# Patient Record
Sex: MALE | Race: WHITE | NOT HISPANIC OR LATINO | Employment: FULL TIME | URBAN - NONMETROPOLITAN AREA
[De-identification: names, ages, dates, MRNs, and addresses within clinical notes are randomized per-mention and may not be internally consistent; named-entity substitution may affect disease eponyms.]

---

## 2020-07-03 ENCOUNTER — APPOINTMENT (OUTPATIENT)
Dept: GENERAL RADIOLOGY | Facility: HOSPITAL | Age: 25
End: 2020-07-03

## 2020-07-03 ENCOUNTER — HOSPITAL ENCOUNTER (EMERGENCY)
Facility: HOSPITAL | Age: 25
Discharge: HOME | End: 2020-07-03
Attending: EMERGENCY MEDICINE | Admitting: EMERGENCY MEDICINE

## 2020-07-03 VITALS
HEIGHT: 72 IN | OXYGEN SATURATION: 99 % | SYSTOLIC BLOOD PRESSURE: 119 MMHG | DIASTOLIC BLOOD PRESSURE: 89 MMHG | RESPIRATION RATE: 18 BRPM | HEART RATE: 78 BPM | TEMPERATURE: 98.2 F | BODY MASS INDEX: 23.27 KG/M2 | WEIGHT: 171.8 LBS

## 2020-07-03 DIAGNOSIS — J40 BRONCHITIS: Primary | ICD-10-CM

## 2020-07-03 PROCEDURE — 71046 X-RAY EXAM CHEST 2 VIEWS: CPT

## 2020-07-03 PROCEDURE — 99282 EMERGENCY DEPT VISIT SF MDM: CPT

## 2020-07-03 NOTE — ED PROVIDER NOTES
Subjective   25-year-old male presents with brown and bloody sputum when he coughs, intermittently for 2 years.  He states that he has had a brown mucousy substance when he coughs intermittently, he has been to the emergency department in the past, but he admits that he is never followed up with her primary care physician as recommended when he goes to emergency departments or urgent treatment centers.  He reports that he is a smoker.  He states that at times he will cough and he will see a brown to mucous substance when he coughs.  He has been on antibiotics in the past, but this does not cleared up.  No fever no chills no shortness of breath.      History provided by:  Patient   used: No        Review of Systems   Respiratory: Positive for cough.    All other systems reviewed and are negative.      History reviewed. No pertinent past medical history.    No Known Allergies    Past Surgical History:   Procedure Laterality Date   • FRACTURE SURGERY         History reviewed. No pertinent family history.    Social History     Socioeconomic History   • Marital status: Single     Spouse name: Not on file   • Number of children: Not on file   • Years of education: Not on file   • Highest education level: Not on file   Tobacco Use   • Smoking status: Current Every Day Smoker     Packs/day: 0.50     Types: Cigarettes   Substance and Sexual Activity   • Alcohol use: Yes     Comment: social   • Drug use: Not Currently           Objective   Physical Exam   Constitutional: He is oriented to person, place, and time. He appears well-developed and well-nourished.   HENT:   Head: Normocephalic and atraumatic.   Eyes: EOM are normal.   Neck: Normal range of motion.   Cardiovascular: Normal rate and regular rhythm.   Pulmonary/Chest: Effort normal and breath sounds normal. No stridor. No respiratory distress.   Musculoskeletal: Normal range of motion.   Neurological: He is alert and oriented to person, place, and  time.   Skin: Skin is warm and dry.   Psychiatric: He has a normal mood and affect.   Nursing note and vitals reviewed.      Procedures           ED Course                                           MDM  Number of Diagnoses or Management Options  Bronchitis: new and requires workup  Risk of Complications, Morbidity, and/or Mortality  Presenting problems: minimal  Diagnostic procedures: minimal  Management options: minimal    Patient Progress  Patient progress: stable      Final diagnoses:   Bronchitis            Arthur Starks Jr., PA-C  07/03/20 3347

## 2021-07-09 ENCOUNTER — TRANSCRIBE ORDERS (OUTPATIENT)
Dept: ADMINISTRATIVE | Facility: HOSPITAL | Age: 26
End: 2021-07-09

## 2021-07-09 ENCOUNTER — HOSPITAL ENCOUNTER (OUTPATIENT)
Dept: ULTRASOUND IMAGING | Facility: HOSPITAL | Age: 26
Discharge: HOME | End: 2021-07-09
Admitting: NURSE PRACTITIONER

## 2021-07-09 ENCOUNTER — HOSPITAL ENCOUNTER (EMERGENCY)
Facility: HOSPITAL | Age: 26
Discharge: HOME | End: 2021-07-09
Attending: EMERGENCY MEDICINE | Admitting: EMERGENCY MEDICINE

## 2021-07-09 VITALS
WEIGHT: 191 LBS | TEMPERATURE: 98.5 F | HEART RATE: 84 BPM | RESPIRATION RATE: 16 BRPM | HEIGHT: 72 IN | OXYGEN SATURATION: 99 % | BODY MASS INDEX: 25.87 KG/M2 | DIASTOLIC BLOOD PRESSURE: 80 MMHG | SYSTOLIC BLOOD PRESSURE: 124 MMHG

## 2021-07-09 DIAGNOSIS — R10.2 ADNEXAL TENDERNESS, RIGHT: ICD-10-CM

## 2021-07-09 DIAGNOSIS — R10.2 ADNEXAL TENDERNESS, RIGHT: Primary | ICD-10-CM

## 2021-07-09 DIAGNOSIS — I82.4Y1 ACUTE DEEP VEIN THROMBOSIS (DVT) OF PROXIMAL VEIN OF RIGHT LOWER EXTREMITY (HCC): Primary | ICD-10-CM

## 2021-07-09 PROCEDURE — 99282 EMERGENCY DEPT VISIT SF MDM: CPT

## 2021-07-09 PROCEDURE — 76857 US EXAM PELVIC LIMITED: CPT

## 2021-07-09 NOTE — ED PROVIDER NOTES
Subjective   Chief Complaint: Right groin pain  History of Present Illness: 26-year-old male comes in for evaluation above complaints.  He states for the past 3 days.  No chest pain or shortness of breath.  No history of recent surgery immobilization or travel.  He had an outpatient ultrasound which showed a right common and superficial femoral DVT.  He has no pain outside of the groin.  No mid or distal thigh or popliteal or calf or ankle or foot pain.  2+ DP pulse on the right.  No history of DVT.  No family history.  No sniffing medical problems.  Onset:3 Days ago  Timing: Onset ongoing  Exacerbating / Alleviating factors: Worse with palpation of the right groin and medial proximal thigh  Associated symptoms: None specifically no chest pain or shortness of breath      Nurses Notes reviewed and agree, including vitals, allergies, social history and prior medical history.          Review of Systems   Constitutional: Negative.    HENT: Negative.    Eyes: Negative.    Respiratory: Negative.    Cardiovascular: Negative.    Gastrointestinal: Negative.    Genitourinary: Negative.    Musculoskeletal:        Right groin and proximal thigh pain   Skin: Negative.    Allergic/Immunologic: Negative.    Neurological: Negative.    Psychiatric/Behavioral: Negative.    All other systems reviewed and are negative.      History reviewed. No pertinent past medical history.    No Known Allergies    Past Surgical History:   Procedure Laterality Date   • FRACTURE SURGERY     • LEG SURGERY Right     spiral fracture repair three years ago        History reviewed. No pertinent family history.    Social History     Socioeconomic History   • Marital status: Single     Spouse name: Not on file   • Number of children: Not on file   • Years of education: Not on file   • Highest education level: Not on file   Tobacco Use   • Smoking status: Current Every Day Smoker     Packs/day: 0.50     Types: Cigarettes   Vaping Use   • Vaping Use: Former    Substance and Sexual Activity   • Alcohol use: Not Currently     Comment: social   • Drug use: Not Currently   • Sexual activity: Defer           Objective   Physical Exam  Vitals and nursing note reviewed.   Constitutional:       Appearance: Normal appearance.   HENT:      Head: Normocephalic and atraumatic.      Nose: Nose normal.   Eyes:      Extraocular Movements: Extraocular movements intact.   Cardiovascular:      Rate and Rhythm: Normal rate and regular rhythm.      Pulses: Normal pulses.   Pulmonary:      Effort: Pulmonary effort is normal.   Musculoskeletal:         General: Normal range of motion.      Cervical back: Normal range of motion.        Legs:       Comments: Mild tenderness to the proximal/medial thigh.   Skin:     General: Skin is warm and dry.   Neurological:      General: No focal deficit present.      Mental Status: He is alert. Mental status is at baseline.   Psychiatric:         Mood and Affect: Mood normal.         Behavior: Behavior normal.         Procedures           ED Course      Right lower extremity appears normal, no swelling, 2+ DP pulse on the right.  Wrist capillary refill, normal skin color.  No calf pain or tenderness no popliteal pain or tenderness.  Discussed with Dr. Steele.  Will start on Eliquis and have follow-up outpatient.  No indication for ultrasound of the rest of the right lower extremity as he has no symptoms distal to where the DVT was found and the right lower extremity appears normal on examination. did discuss with the patient to return for any chest pain shortness of breath or other concerning symptoms and he expressed understanding.  Also discussed that he will be on Eliquis and needs to avoid any dangerous activities which could result in trauma                                     MDM    Final diagnoses:   Acute deep vein thrombosis (DVT) of proximal vein of right lower extremity (CMS/HCC)       ED Disposition  ED Disposition     ED Disposition  Condition Comment    Discharge Stable           Noe Lee MD  [address]  [address]  [facility] [address] [phone]    Schedule an appointment as soon as possible for a visit       [facility]  [address] [address] [phone]    If symptoms worsen         Medication List      New Prescriptions    apixaban 5 MG tablet tablet  Commonly known as: ELIQUIS  10mg PO BID x7 days then 5mg PO BID thereafter           Where to Get Your Medications      These medications were sent to [facility] - [place], [place] - [address] - [phone]  - [phone]   [address] [address]    Phone: [phone]   · apixaban 5 MG tablet tablet          Mason Bevrely PA-C  07/09/21 1414       Mason Beverly PA-C  07/09/21 9815

## 2021-08-03 ENCOUNTER — TRANSCRIBE ORDERS (OUTPATIENT)
Dept: LAB | Facility: HOSPITAL | Age: 26
End: 2021-08-03

## 2021-08-03 ENCOUNTER — LAB (OUTPATIENT)
Dept: LAB | Facility: HOSPITAL | Age: 26
End: 2021-08-03

## 2021-08-03 DIAGNOSIS — D69.6 THROMBOCYTOPENIA, UNSPECIFIED (HCC): ICD-10-CM

## 2021-08-03 DIAGNOSIS — E87.6 HYPOPOTASSEMIA: ICD-10-CM

## 2021-08-03 DIAGNOSIS — E87.6 HYPOPOTASSEMIA: Primary | ICD-10-CM

## 2021-08-03 LAB
BASOPHILS # BLD AUTO: 0.05 10*3/MM3 (ref 0–0.2)
BASOPHILS NFR BLD AUTO: 0.5 % (ref 0–1.5)
DEPRECATED RDW RBC AUTO: 39.2 FL (ref 37–54)
EOSINOPHIL # BLD AUTO: 0.44 10*3/MM3 (ref 0–0.4)
EOSINOPHIL NFR BLD AUTO: 4.3 % (ref 0.3–6.2)
ERYTHROCYTE [DISTWIDTH] IN BLOOD BY AUTOMATED COUNT: 12.3 % (ref 12.3–15.4)
HCT VFR BLD AUTO: 42 % (ref 37.5–51)
HGB BLD-MCNC: 13.9 G/DL (ref 13–17.7)
IMM GRANULOCYTES # BLD AUTO: 0.03 10*3/MM3 (ref 0–0.05)
IMM GRANULOCYTES NFR BLD AUTO: 0.3 % (ref 0–0.5)
INR PPP: 1.03 (ref 0.9–1.1)
LYMPHOCYTES # BLD AUTO: 3.07 10*3/MM3 (ref 0.7–3.1)
LYMPHOCYTES NFR BLD AUTO: 29.9 % (ref 19.6–45.3)
MCH RBC QN AUTO: 28.9 PG (ref 26.6–33)
MCHC RBC AUTO-ENTMCNC: 33.1 G/DL (ref 31.5–35.7)
MCV RBC AUTO: 87.3 FL (ref 79–97)
MONOCYTES # BLD AUTO: 0.56 10*3/MM3 (ref 0.1–0.9)
MONOCYTES NFR BLD AUTO: 5.4 % (ref 5–12)
NEUTROPHILS NFR BLD AUTO: 59.6 % (ref 42.7–76)
NEUTROPHILS NFR BLD AUTO: 6.13 10*3/MM3 (ref 1.7–7)
NRBC BLD AUTO-RTO: 0 /100 WBC (ref 0–0.2)
PLATELET # BLD AUTO: 82 10*3/MM3 (ref 140–450)
PMV BLD AUTO: 11.4 FL (ref 6–12)
PROTHROMBIN TIME: 14.1 SECONDS (ref 12–15.1)
RBC # BLD AUTO: 4.81 10*6/MM3 (ref 4.14–5.8)
WBC # BLD AUTO: 10.28 10*3/MM3 (ref 3.4–10.8)

## 2021-08-03 PROCEDURE — 85610 PROTHROMBIN TIME: CPT

## 2021-08-03 PROCEDURE — 85025 COMPLETE CBC W/AUTO DIFF WBC: CPT

## 2021-08-03 PROCEDURE — 36415 COLL VENOUS BLD VENIPUNCTURE: CPT

## 2021-08-05 ENCOUNTER — OFFICE VISIT (OUTPATIENT)
Dept: CARDIOLOGY | Facility: CLINIC | Age: 26
End: 2021-08-05

## 2021-08-05 VITALS
HEIGHT: 72 IN | BODY MASS INDEX: 24.87 KG/M2 | OXYGEN SATURATION: 98 % | RESPIRATION RATE: 18 BRPM | DIASTOLIC BLOOD PRESSURE: 82 MMHG | HEART RATE: 111 BPM | WEIGHT: 183.6 LBS | SYSTOLIC BLOOD PRESSURE: 112 MMHG

## 2021-08-05 DIAGNOSIS — I82.411 ACUTE DEEP VEIN THROMBOSIS (DVT) OF FEMORAL VEIN OF RIGHT LOWER EXTREMITY (HCC): Primary | ICD-10-CM

## 2021-08-05 DIAGNOSIS — R79.89 ABNORMAL CBC: ICD-10-CM

## 2021-08-05 PROBLEM — R00.0 TACHYCARDIA: Status: ACTIVE | Noted: 2021-08-05

## 2021-08-05 PROCEDURE — 99202 OFFICE O/P NEW SF 15 MIN: CPT | Performed by: NURSE PRACTITIONER

## 2021-08-05 PROCEDURE — 93000 ELECTROCARDIOGRAM COMPLETE: CPT | Performed by: NURSE PRACTITIONER

## 2021-08-05 RX ORDER — HYDROCODONE BITARTRATE AND ACETAMINOPHEN 5; 325 MG/1; MG/1
1 TABLET ORAL EVERY 6 HOURS PRN
COMMUNITY
End: 2021-12-31

## 2021-08-05 NOTE — PROGRESS NOTES
[facility] Cardiology Office Consult Note    Vargas Reyna  [id number]  2021    Referred By: No ref. provider found    Chief Complaint: DVT    History of Present Illness:   Mr. Vargas Reyna is a 26 y.o. male who presents to the Cardiology Clinic for deep vein thrombosis.  The patient has a past medical history of spiral fracture of right leg with repair and 2018.  Until recently, he was 1/2 pack/day smoker.  He presents today after being started on Eliquis in the hospital for a deep vein thrombosis of his right groin.  The patient denies any additional groin pain.  He reports that he no longer consumes energy drinks.  He does smoke marijuana.  He specifically denies chest pain, dyspnea.  He denies palpitations, dizziness, syncope.  He reports some right ankle swelling in addition to joint pain x's 1 month of multiple areas including fingers, elbows, and knees.  He states that he was told his platelets were so low they wouldn't register on a lab test.  He states that this was drawn by his new PCP who name he cannot remember (across from [place] [facility] to [facility]?).  He denies any personal or family history of blood or bleeding problems.  He denies family history of blood clots, clotting disorders.  He has not scheduled a follow-up yet for this.  He offers no other complaints or concerns at this time.    Past Cardiac Testin. Last Coronary Angio: None  2. Prior Stress Testing: None  3. Last Echo: None  4. Prior Holter Monitor: None    Review of Systems:   Review of Systems   Constitutional: Negative for activity change, chills, diaphoresis, fatigue, fever and unexpected weight gain.   Eyes: Negative for blurred vision and visual disturbance.   Respiratory: Negative for apnea, cough, chest tightness, shortness of breath and wheezing.    Cardiovascular: Positive for leg swelling. Negative for chest pain and palpitations.        Left leg swelling/ankle swelling    Gastrointestinal: Negative for abdominal distention, blood in stool, GERD and indigestion.   Endocrine: Negative for cold intolerance and heat intolerance.   Genitourinary: Negative for hematuria.   Musculoskeletal: Positive for arthralgias. Negative for gait problem, joint swelling and myalgias.   Skin: Negative for color change, pallor and bruise.   Neurological: Negative for dizziness, seizures, syncope, weakness, light-headedness, numbness, headache and confusion.   Hematological: Does not bruise/bleed easily.   Psychiatric/Behavioral: Negative for behavioral problems, sleep disturbance, suicidal ideas and depressed mood.     I have reviewed and confirmed the accuracy of the ROS as documented by the MA/LPN/RN TRE Clement      Past Medical History:   Past Medical History:   Diagnosis Date   • Deep vein thrombosis (CMS/HCC)        Past Surgical History:   Past Surgical History:   Procedure Laterality Date   • FRACTURE SURGERY     • LEG SURGERY Right     spiral fracture repair three years ago        Family History:   Family History   Problem Relation Age of Onset   • No Known Problems Mother        Social History:   Social History     Socioeconomic History   • Marital status: Single     Spouse name: Not on file   • Number of children: Not on file   • Years of education: Not on file   • Highest education level: Not on file   Tobacco Use   • Smoking status: Former Smoker     Packs/day: 0.50     Types: Cigarettes     Quit date: 2021     Years since quittin.0   • Smokeless tobacco: Former User   Vaping Use   • Vaping Use: Former   • Substances: years ago   Substance and Sexual Activity   • Alcohol use: Not Currently     Comment: social   • Drug use: Yes     Types: Marijuana     Comment: few times daily   • Sexual activity: Defer       Medications:     Current Outpatient Medications:   •  apixaban (ELIQUIS) 5 MG tablet tablet, 10mg PO BID x7 days then 5mg PO BID thereafter (Patient taking  "differently: Take 5 mg by mouth Every 12 (Twelve) Hours. 10mg PO BID x7 days then 5mg PO BID thereafter), Disp: 60 tablet, Rfl: 0  •  HYDROcodone-acetaminophen (NORCO) 5-325 MG per tablet, Take 1 tablet by mouth Every 6 (Six) Hours As Needed., Disp: , Rfl:     Allergies:   No Known Allergies    Physical Exam:  Vital Signs:   Vitals:    08/05/21 0946 08/05/21 0954   BP: 112/80 112/82   BP Location: Right arm Left arm   Patient Position: Sitting Sitting   Cuff Size: Adult Adult   Pulse: 111    Resp: 18    SpO2: 98%    Weight: 83.3 kg (183 lb 9.6 oz)    Height: 182.9 cm (72.01\")      Body mass index is 24.9 kg/m².    Physical Exam  Vitals and nursing note reviewed.   Constitutional:       General: He is not in acute distress.     Appearance: Normal appearance. He is well-developed.   HENT:      Head: Normocephalic and atraumatic.   Eyes:      General: No scleral icterus.     Extraocular Movements: Extraocular movements intact.   Neck:      Trachea: Trachea normal.   Cardiovascular:      Rate and Rhythm: Regular rhythm. Tachycardia present.      Pulses: Normal pulses.      Heart sounds: Normal heart sounds. No murmur heard.   No friction rub. No gallop.    Pulmonary:      Effort: Pulmonary effort is normal.      Breath sounds: Normal breath sounds.   Abdominal:      Palpations: Abdomen is soft.      Tenderness: There is no abdominal tenderness.   Musculoskeletal:         General: Normal range of motion.      Cervical back: Neck supple.      Right lower leg: No edema.      Left lower leg: Edema present.      Comments: Trace edema of left ankle and foot.  Nonpitting.  Skin is pink warm dry and intact.   Skin:     General: Skin is warm and dry.      Findings: No bruising, lesion or rash.   Neurological:      Mental Status: He is alert and oriented to person, place, and time.      Motor: No weakness.      Gait: Gait normal.   Psychiatric:         Mood and Affect: Mood normal.         Behavior: Behavior normal. Behavior is " cooperative.         Thought Content: Thought content does not include suicidal ideation.       .  Results Review:   I reviewed the patient's new clinical results.      ECG 12 Lead    Date/Time: 8/5/2021 11:05 AM  Performed by: Lora Chapin APRN  Authorized by: Lora Chapin APRN   Rhythm: sinus tachycardia  Rate: tachycardic  QRS axis: right  Comments: Asymptomatic tachycardia        Assessment / Plan:   Diagnoses and all orders for this visit:    1. Recent deep vein thrombosis (DVT) of right groin  --Continue Eliquis  --Follow-up with primary care provider    2. Abnormal CBC  --Platelets 82 on 8/3/21  --No repeat labs that I can see to confirm this  --No apparent physical manifestation  --Patient instructed to call and make a follow-up with PCP who ordered this lab        Preventative Cardiology:   Tobacco Cessation: Cessation Counseling Provided    Advance Care Planning: ACP discussion was declined by the patient. Patient does not have an advance directive, declines further assistance.       Follow Up:   Return if symptoms worsen or fail to improve.      Thank you for allowing me to participate in the care of your patient. Please to not hesitate to contact me with additional questions or concerns.     TRE Castaneda

## 2021-09-27 ENCOUNTER — APPOINTMENT (OUTPATIENT)
Dept: GENERAL RADIOLOGY | Facility: HOSPITAL | Age: 26
End: 2021-09-27

## 2021-09-27 ENCOUNTER — APPOINTMENT (OUTPATIENT)
Dept: ULTRASOUND IMAGING | Facility: HOSPITAL | Age: 26
End: 2021-09-27

## 2021-09-27 ENCOUNTER — HOSPITAL ENCOUNTER (EMERGENCY)
Facility: HOSPITAL | Age: 26
Discharge: HOME | End: 2021-09-27
Attending: EMERGENCY MEDICINE | Admitting: EMERGENCY MEDICINE

## 2021-09-27 VITALS
OXYGEN SATURATION: 99 % | SYSTOLIC BLOOD PRESSURE: 130 MMHG | BODY MASS INDEX: 25.33 KG/M2 | DIASTOLIC BLOOD PRESSURE: 88 MMHG | RESPIRATION RATE: 17 BRPM | HEIGHT: 72 IN | WEIGHT: 187 LBS | HEART RATE: 88 BPM | TEMPERATURE: 98.7 F

## 2021-09-27 DIAGNOSIS — I82.412 ACUTE DEEP VEIN THROMBOSIS (DVT) OF FEMORAL VEIN OF LEFT LOWER EXTREMITY (HCC): Primary | ICD-10-CM

## 2021-09-27 DIAGNOSIS — M25.562 ACUTE PAIN OF LEFT KNEE: ICD-10-CM

## 2021-09-27 PROCEDURE — 71046 X-RAY EXAM CHEST 2 VIEWS: CPT

## 2021-09-27 PROCEDURE — 73562 X-RAY EXAM OF KNEE 3: CPT

## 2021-09-27 PROCEDURE — 99283 EMERGENCY DEPT VISIT LOW MDM: CPT

## 2021-09-27 PROCEDURE — 93971 EXTREMITY STUDY: CPT

## 2021-09-27 RX ORDER — RIVAROXABAN 15 MG-20MG
KIT ORAL
Qty: 51 EACH | Refills: 0 | Status: SHIPPED | OUTPATIENT
Start: 2021-09-27 | End: 2021-11-02

## 2021-09-27 RX ADMIN — RIVAROXABAN 15 MG: 15 TABLET, FILM COATED ORAL at 22:23

## 2021-09-27 NOTE — ED PROVIDER NOTES
Subjective   Chief Complaint: Left knee and popliteal pain, cough  History of Present Illness: 26-year-old male comes in for evaluation above complaints.  He was seen at this facility back in July of this year, roughly 2 months ago, and diagnosed with a DVT in his right groin.  No history of DVTs.  Currently on Eliquis.  He comes in for evaluation stating he has had left knee and popliteal pain for about a year but is getting worse.  He also states that days he had productive cough with sputum that is nonbloody.  No shortness of breath.  No chest pain.  No pleuritic chest pain.  No known injury to the left knee.  Onset: Left knee pain 1 year, cough 4 days  Timing: Intermittent left knee pain  Exacerbating / Alleviating factors: Extension of the left knee makes the pain worse as does weightbearing  Associated symptoms: None      Nurses Notes reviewed and agree, including vitals, allergies, social history and prior medical history.          Review of Systems   Constitutional: Negative.    HENT: Negative.    Eyes: Negative.    Respiratory: Positive for cough. Negative for shortness of breath.    Cardiovascular: Negative.  Negative for chest pain.   Gastrointestinal: Negative.    Genitourinary: Negative.    Musculoskeletal: Negative.         Left knee pain/popliteal pain   Skin: Negative.    Allergic/Immunologic: Negative.    Neurological: Negative.    Psychiatric/Behavioral: Negative.    All other systems reviewed and are negative.      Past Medical History:   Diagnosis Date   • Deep vein thrombosis (CMS/HCC)        No Known Allergies    Past Surgical History:   Procedure Laterality Date   • FRACTURE SURGERY     • LEG SURGERY Right     spiral fracture repair three years ago        Family History   Problem Relation Age of Onset   • No Known Problems Mother        Social History     Socioeconomic History   • Marital status: Single     Spouse name: Not on file   • Number of children: Not on file   • Years of education:  Not on file   • Highest education level: Not on file   Tobacco Use   • Smoking status: Former Smoker     Packs/day: 0.50     Types: Cigarettes     Quit date: 2021     Years since quittin.1   • Smokeless tobacco: Former User   Vaping Use   • Vaping Use: Former   • Substances: years ago   Substance and Sexual Activity   • Alcohol use: Not Currently     Comment: social   • Drug use: Yes     Types: Marijuana     Comment: few times daily   • Sexual activity: Defer           Objective   Physical Exam  Vitals and nursing note reviewed.   Constitutional:       General: He is not in acute distress.     Appearance: Normal appearance. He is normal weight. He is not ill-appearing or diaphoretic.   HENT:      Head: Normocephalic and atraumatic.   Cardiovascular:      Rate and Rhythm: Normal rate.   Pulmonary:      Effort: Pulmonary effort is normal. No respiratory distress.      Breath sounds: Normal breath sounds. No stridor. No wheezing, rhonchi or rales.   Chest:      Chest wall: No tenderness.   Musculoskeletal:         General: Normal range of motion.      Cervical back: Normal range of motion.      Comments: Left knee is normal without effusion or overlying cellulitis.  Full range of motion.  No pain with palpation of the left knee, patellar, popliteal area.  No left calf pain.  2+ DP pulse on the left.   Skin:     General: Skin is warm and dry.      Comments: Skin temperature is normal of the left knee without any appreciable warmth   Neurological:      General: No focal deficit present.      Mental Status: He is alert and oriented to person, place, and time. Mental status is at baseline.   Psychiatric:         Mood and Affect: Mood normal.         Behavior: Behavior normal.         Procedures           ED Course                                           MDM  Number of Diagnoses or Management Options  Acute deep vein thrombosis (DVT) of femoral vein of left lower extremity (HCC)  Acute pain of left  knee  Diagnosis management comments: 22:22 EDT Ultrasound reveals DVT of the left femoral/popliteal vein. Discussed with Dr. Lee, will switch to xarelto and have him follow up as outpatient. He describes more of what sounds like musculoskeletal pain. Will have him follow up with ortho as well. He is happy with this plan.      Final diagnoses:   Acute deep vein thrombosis (DVT) of femoral vein of left lower extremity (HCC)   Acute pain of left knee          Onel Haddad MD  09/27/21 7001

## 2021-10-07 ENCOUNTER — CONSULT (OUTPATIENT)
Dept: ONCOLOGY | Facility: CLINIC | Age: 26
End: 2021-10-07

## 2021-10-07 ENCOUNTER — LAB (OUTPATIENT)
Dept: LAB | Facility: HOSPITAL | Age: 26
End: 2021-10-07

## 2021-10-07 VITALS
DIASTOLIC BLOOD PRESSURE: 77 MMHG | SYSTOLIC BLOOD PRESSURE: 121 MMHG | WEIGHT: 181 LBS | HEIGHT: 72 IN | OXYGEN SATURATION: 91 % | RESPIRATION RATE: 12 BRPM | TEMPERATURE: 98 F | BODY MASS INDEX: 24.52 KG/M2 | HEART RATE: 91 BPM

## 2021-10-07 DIAGNOSIS — D69.6 THROMBOCYTOPENIA (HCC): ICD-10-CM

## 2021-10-07 DIAGNOSIS — D69.6 THROMBOCYTOPENIA (HCC): Primary | ICD-10-CM

## 2021-10-07 DIAGNOSIS — I82.4Z3 ACUTE DEEP VEIN THROMBOSIS (DVT) OF DISTAL VEIN OF BOTH LOWER EXTREMITIES (HCC): ICD-10-CM

## 2021-10-07 LAB
DEPRECATED RDW RBC AUTO: 40.7 FL (ref 37–54)
ERYTHROCYTE [DISTWIDTH] IN BLOOD BY AUTOMATED COUNT: 13.1 % (ref 12.3–15.4)
FOLATE SERPL-MCNC: 12.3 NG/ML (ref 4.78–24.2)
HCT VFR BLD AUTO: 45.2 % (ref 37.5–51)
HGB BLD-MCNC: 15 G/DL (ref 13–17.7)
MCH RBC QN AUTO: 28.6 PG (ref 26.6–33)
MCHC RBC AUTO-ENTMCNC: 33.2 G/DL (ref 31.5–35.7)
MCV RBC AUTO: 86.1 FL (ref 79–97)
PLATELET # BLD AUTO: 90 10*3/MM3 (ref 140–450)
PMV BLD AUTO: 11.3 FL (ref 6–12)
RBC # BLD AUTO: 5.25 10*6/MM3 (ref 4.14–5.8)
VIT B12 BLD-MCNC: 456 PG/ML (ref 211–946)
WBC # BLD AUTO: 7.71 10*3/MM3 (ref 3.4–10.8)

## 2021-10-07 PROCEDURE — 86146 BETA-2 GLYCOPROTEIN ANTIBODY: CPT

## 2021-10-07 PROCEDURE — 82728 ASSAY OF FERRITIN: CPT

## 2021-10-07 PROCEDURE — 84466 ASSAY OF TRANSFERRIN: CPT

## 2021-10-07 PROCEDURE — 99204 OFFICE O/P NEW MOD 45 MIN: CPT | Performed by: INTERNAL MEDICINE

## 2021-10-07 PROCEDURE — 85613 RUSSELL VIPER VENOM DILUTED: CPT

## 2021-10-07 PROCEDURE — 85598 HEXAGNAL PHOSPH PLTLT NEUTRL: CPT

## 2021-10-07 PROCEDURE — 82746 ASSAY OF FOLIC ACID SERUM: CPT

## 2021-10-07 PROCEDURE — 85705 THROMBOPLASTIN INHIBITION: CPT

## 2021-10-07 PROCEDURE — 85302 CLOT INHIBIT PROT C ANTIGEN: CPT

## 2021-10-07 PROCEDURE — 83615 LACTATE (LD) (LDH) ENZYME: CPT

## 2021-10-07 PROCEDURE — 82607 VITAMIN B-12: CPT

## 2021-10-07 PROCEDURE — 85300 ANTITHROMBIN III ACTIVITY: CPT

## 2021-10-07 PROCEDURE — 85732 THROMBOPLASTIN TIME PARTIAL: CPT

## 2021-10-07 PROCEDURE — 81241 F5 GENE: CPT

## 2021-10-07 PROCEDURE — 80053 COMPREHEN METABOLIC PANEL: CPT

## 2021-10-07 PROCEDURE — 83010 ASSAY OF HAPTOGLOBIN QUANT: CPT

## 2021-10-07 PROCEDURE — 85306 CLOT INHIBIT PROT S FREE: CPT

## 2021-10-07 PROCEDURE — 85303 CLOT INHIBIT PROT C ACTIVITY: CPT

## 2021-10-07 PROCEDURE — 85007 BL SMEAR W/DIFF WBC COUNT: CPT

## 2021-10-07 PROCEDURE — 86147 CARDIOLIPIN ANTIBODY EA IG: CPT

## 2021-10-07 PROCEDURE — 85025 COMPLETE CBC W/AUTO DIFF WBC: CPT

## 2021-10-07 PROCEDURE — 81240 F2 GENE: CPT

## 2021-10-07 PROCEDURE — 36415 COLL VENOUS BLD VENIPUNCTURE: CPT

## 2021-10-07 PROCEDURE — 83540 ASSAY OF IRON: CPT

## 2021-10-07 PROCEDURE — 85670 THROMBIN TIME PLASMA: CPT

## 2021-10-07 NOTE — PROGRESS NOTES
New Patient Office Visit      Date: 10/07/2021     Patient Name: Vargas Reyna  MRN: [id number]  : 1995  Referring Physician: Alysha Noe    Chief Complaint: Establish care for DVT and thrombocytopenia    History of Present Illness: Vargas Reyna is a pleasant 26 y.o. male the past medical history of bilateral lower extremity DVT who presents today for evaluation of DVT and thrombocytopenia. The patient states that he initially started to develop significant right-sided leg discomfort in July of this year.  He underwent an ultrasound which was notable for a right lower extremity DVT.  He denied any long car or plane rides.  Is not on any hormonal supplementation.  Denies any family history of blood clots.  He was started on Eliquis and completed almost 3 months of treatment when he started to develop left lower extremity pain and swelling.  He again underwent an ultrasound which was notable for a new DVT in the left lower extremity.  He has now been transitioned to Xarelto which she is tolerating well.  In regards to his thrombocytopenia, he has had multiple lab draws with platelet counts between 50-80K.  Per review of outside records, platelets have been clumped with these lab draws and have been adequate numbers during review of smear.  He denies any petechiae or any easy bleeding or bruising at this time.  Denies any family history of thrombocytopenia.    Oncology History:    Oncology/Hematology History    No history exists.       Subjective      Review of Systems:     Constitutional: Negative for fevers, chills, or weight loss  Eyes: Negative for blurred vision or discharge         Ear/Nose/Throat: Negative for difficulty swallowing, sore throat, LAD                                                       Respiratory: Negative for cough, SOA, wheezing                                                                                        Cardiovascular: Negative for chest pain or  palpitations                                                                  Gastrointestinal: Negative for nausea, vomiting or diarrhea                                                                     Genitourinary: Negative for dysuria or hematuria                                                                                           Musculoskeletal: Negative for any joint pains or muscle aches                                                                        Neurologic: Negative for any weakness, headaches, dizziness                                                                         Hematologic: Negative for any easy bleeding or bruising                                                                                   Psychiatric: Negative for anxiety or depression                             Past Medical History:   Past Medical History:   Diagnosis Date   • Deep vein thrombosis (HCC)        Past Surgical History:   Past Surgical History:   Procedure Laterality Date   • FRACTURE SURGERY     • LEG SURGERY Right     spiral fracture repair three years ago        Family History:   Family History   Problem Relation Age of Onset   • No Known Problems Mother        Social History:   Social History     Socioeconomic History   • Marital status: Single     Spouse name: Not on file   • Number of children: Not on file   • Years of education: Not on file   • Highest education level: Not on file   Tobacco Use   • Smoking status: Former Smoker     Packs/day: 0.50     Years: 3.00     Pack years: 1.50     Types: Cigarettes     Quit date: 2021     Years since quittin.2   • Smokeless tobacco: Former User   Vaping Use   • Vaping Use: Former   • Substances: years ago   Substance and Sexual Activity   • Alcohol use: Not Currently     Comment: social   • Drug use: Yes     Types: Marijuana     Comment: few times daily   • Sexual activity: Defer       Medications:     Current Outpatient Medications:   •   "HYDROcodone-acetaminophen (NORCO) 5-325 MG per tablet, Take 1 tablet by mouth Every 6 (Six) Hours As Needed., Disp: , Rfl:   •  Rivaroxaban (Xarelto Starter Pack) tablet therapy pack starter pack, Take one 15 mg tablet twice daily with food for 21 days.  Followed by one 20 mg tablet by mouth once daily with food. Take as directed, Disp: 51 each, Rfl: 0    Allergies:   No Known Allergies    Objective     Physical Exam:  Vital Signs:   Vitals:    10/07/21 1358   BP: 121/77   Pulse: 91   Resp: 12   Temp: 98 °F (36.7 °C)   TempSrc: Temporal   SpO2: 91%   Weight: 82.1 kg (181 lb)   Height: 182.9 cm (72\")   PainSc: 0-No pain     Pain Score    10/07/21 1358   PainSc: 0-No pain     ECOG Performance Status: 0 - Asymptomatic    Constitutional: NAD, ECOG 0  Eyes: PERRLA, scleral anicteric  ENT: No LAD, no thyromegaly  Respiratory: CTAB, no wheezing, rales, rhonchi  Cardiovascular: RRR, no murmurs, pulses 2+ bilaterally  Abdomen: soft, NT/ND, no HSM  Musculoskeletal: strength 5/5 bilaterally, no c/c/e  Neurologic: A&O x 3, CN II-XII intact grossly  Psych: mood and affect congruent, no SI or HI    Results Review:   No visits with results within 2 Week(s) from this visit.   Latest known visit with results is:   Lab on 08/03/2021   Component Date Value Ref Range Status   • Protime 08/03/2021 14.1  12.0 - 15.1 Seconds Final   • INR 08/03/2021 1.03  0.90 - 1.10 Final   • WBC 08/03/2021 10.28  3.40 - 10.80 10*3/mm3 Final   • RBC 08/03/2021 4.81  4.14 - 5.80 10*6/mm3 Final   • Hemoglobin 08/03/2021 13.9  13.0 - 17.7 g/dL Final   • Hematocrit 08/03/2021 42.0  37.5 - 51.0 % Final   • MCV 08/03/2021 87.3  79.0 - 97.0 fL Final   • MCH 08/03/2021 28.9  26.6 - 33.0 pg Final   • MCHC 08/03/2021 33.1  31.5 - 35.7 g/dL Final   • RDW 08/03/2021 12.3  12.3 - 15.4 % Final   • RDW-SD 08/03/2021 39.2  37.0 - 54.0 fl Final   • MPV 08/03/2021 11.4  6.0 - 12.0 fL Final   • Platelets 08/03/2021 82* 140 - 450 10*3/mm3 Final   • Neutrophil % 08/03/2021 " 59.6  42.7 - 76.0 % Final   • Lymphocyte % 08/03/2021 29.9  19.6 - 45.3 % Final   • Monocyte % 08/03/2021 5.4  5.0 - 12.0 % Final   • Eosinophil % 08/03/2021 4.3  0.3 - 6.2 % Final   • Basophil % 08/03/2021 0.5  0.0 - 1.5 % Final   • Immature Grans % 08/03/2021 0.3  0.0 - 0.5 % Final   • Neutrophils, Absolute 08/03/2021 6.13  1.70 - 7.00 10*3/mm3 Final   • Lymphocytes, Absolute 08/03/2021 3.07  0.70 - 3.10 10*3/mm3 Final   • Monocytes, Absolute 08/03/2021 0.56  0.10 - 0.90 10*3/mm3 Final   • Eosinophils, Absolute 08/03/2021 0.44* 0.00 - 0.40 10*3/mm3 Final   • Basophils, Absolute 08/03/2021 0.05  0.00 - 0.20 10*3/mm3 Final   • Immature Grans, Absolute 08/03/2021 0.03  0.00 - 0.05 10*3/mm3 Final   • nRBC 08/03/2021 0.0  0.0 - 0.2 /100 WBC Final       XR Chest 2 View    Result Date: 9/28/2021  Narrative: PROCEDURE: XR CHEST 2 VW-   HISTORY: cough  COMPARISON: None.  FINDINGS:  The lungs are clear.   There is no evidence of effusion or other pleural disease.  The mediastinum has a normal appearance.  The cardiac silhouette is unremarkable.      Impression: Unremarkable chest exam.  This report was finalized on 9/28/2021 7:53 AM by Margarito Zayas MD.    XR Knee 3 View Left    Result Date: 9/28/2021  Narrative: PROCEDURE: XR KNEE 3 VW LEFT-  THREE VIEW  HISTORY: left knee pain  FINDINGS:  Three views show no evidence of an acute, displaced fracture or dislocation of the visualized bony architecture.  The joint spaces appear normal.      Impression: Unremarkable exam.    This report was finalized on 9/28/2021 7:53 AM by Margarito Zayas MD.    US Venous Doppler Lower Extremity Left (duplex)    Result Date: 9/27/2021  Narrative: FINAL REPORT TECHNIQUE: Multiple transverse and longitudinal images were performed of the femoral-popliteal deep venous system with augmentation and compression maneuvers. CLINICAL HISTORY: left popliteal pain, hx of DVT FINDINGS: There is noncompressible thrombus partially occluding the common femoral  and greater saphenous veins.  Elsewhere there is normal flow and compressibility.     Impression: DVT above the knee. Authenticated by Alek Avila M.D. on 09/27/2021 09:21:40 PM      Assessment / Plan      Assessment/Plan:   1. Thrombocytopenia (HCC) (Primary)  -Likely secondary to pseudothrombocytopenia given platelet clumping noted on recent lab draws  -We will check labs as below to rule out other secondary causes as well as review of peripheral smear  -We will plan to check CBC and a non-EDTA-containing tube today  -     CBC & Differential; Future  -     Peripheral Blood Smear; Future  -     Comprehensive Metabolic Panel; Future  -     Lactate Dehydrogenase; Future  -     Haptoglobin; Future  -     Vitamin B12; Future  -     Folate; Future  -     Ferritin; Future  -     Iron Profile; Future      2. Acute deep vein thrombosis (DVT) of distal vein of both lower extremities (HCC)  -Patient with 2 unprovoked DVTs.  First occurring in the right lower extremity for which she was treated with Eliquis x2.5 months before developing a left lower extremity DVT  -Currently on Xarelto at this time and tolerating well  -We will plan to check hypercoagulable work-up below  -Depending on work-up, he may benefit from indefinite anticoagulation  -     Anticardiolipin Antibody, IgG / M, Qn; Future  -     Antithrombin III; Future  -     Beta-2 Glycoprotein Antibodies; Future  -     Factor 5 Leiden; Future  -     Lupus Anticoagulant; Future  -     Factor II, DNA Analysis; Future  -     Protein S Functional; Future  -     Protein C Antigen, Total; Future        -     Protein C Activity; Future     Follow Up:   Follow-up in 3-4 weeks     Barry Clark MD  Hematology and Oncology     Please note that portions of this note may have been completed with a voice recognition program. Efforts were made to edit the dictations, but occasionally words are mistranscribed.

## 2021-10-08 LAB
ALBUMIN SERPL-MCNC: 4.8 G/DL (ref 3.5–5.2)
ALBUMIN/GLOB SERPL: 1.3 G/DL
ALP SERPL-CCNC: 59 U/L (ref 39–117)
ALT SERPL W P-5'-P-CCNC: 33 U/L (ref 1–41)
ANION GAP SERPL CALCULATED.3IONS-SCNC: 12.3 MMOL/L (ref 5–15)
AST SERPL-CCNC: 25 U/L (ref 1–40)
BILIRUB SERPL-MCNC: 0.4 MG/DL (ref 0–1.2)
BUN SERPL-MCNC: 9 MG/DL (ref 6–20)
BUN/CREAT SERPL: 12 (ref 7–25)
CALCIUM SPEC-SCNC: 10.1 MG/DL (ref 8.6–10.5)
CARDIOLIPIN IGG SER IA-ACNC: 80 GPL U/ML (ref 0–14)
CARDIOLIPIN IGM SER IA-ACNC: >150 MPL U/ML (ref 0–12)
CHLORIDE SERPL-SCNC: 101 MMOL/L (ref 98–107)
CO2 SERPL-SCNC: 24.7 MMOL/L (ref 22–29)
CREAT SERPL-MCNC: 0.75 MG/DL (ref 0.76–1.27)
EOSINOPHIL # BLD MANUAL: 0.15 10*3/MM3 (ref 0–0.4)
EOSINOPHIL NFR BLD MANUAL: 2 % (ref 0.3–6.2)
F5 GENE MUT ANL BLD/T: NORMAL
FACTOR II, DNA ANALYSIS: NORMAL
FERRITIN SERPL-MCNC: 98.7 NG/ML (ref 30–400)
GFR SERPL CREATININE-BSD FRML MDRD: 128 ML/MIN/1.73
GLOBULIN UR ELPH-MCNC: 3.7 GM/DL
GLUCOSE SERPL-MCNC: 89 MG/DL (ref 65–99)
HAPTOGLOB SERPL-MCNC: 192 MG/DL (ref 30–200)
IRON 24H UR-MRATE: 78 MCG/DL (ref 59–158)
IRON SATN MFR SERPL: 22 % (ref 20–50)
LAB AP CASE REPORT: NORMAL
LDH SERPL-CCNC: 233 U/L (ref 135–225)
LYMPHOCYTES # BLD MANUAL: 2.31 10*3/MM3 (ref 0.7–3.1)
LYMPHOCYTES NFR BLD MANUAL: 30 % (ref 19.6–45.3)
LYMPHOCYTES NFR BLD MANUAL: 7 % (ref 5–12)
MONOCYTES # BLD AUTO: 0.54 10*3/MM3 (ref 0.1–0.9)
NEUTROPHILS # BLD AUTO: 4.7 10*3/MM3 (ref 1.7–7)
NEUTROPHILS NFR BLD MANUAL: 61 % (ref 42.7–76)
PATH REPORT.FINAL DX SPEC: NORMAL
PATH REPORT.GROSS SPEC: NORMAL
PATHOLOGY REVIEW: YES
PLAT MORPH BLD: NORMAL
POTASSIUM SERPL-SCNC: 4.1 MMOL/L (ref 3.5–5.2)
PROT SERPL-MCNC: 8.5 G/DL (ref 6–8.5)
RBC MORPH BLD: NORMAL
SODIUM SERPL-SCNC: 138 MMOL/L (ref 136–145)
TIBC SERPL-MCNC: 355 MCG/DL (ref 298–536)
TRANSFERRIN SERPL-MCNC: 238 MG/DL (ref 200–360)
WBC MORPH BLD: NORMAL

## 2021-10-09 LAB
AT III ACT/NOR PPP CHRO: 120 % (ref 75–135)
PROT C AG ACT/NOR PPP IA: 85 % (ref 60–150)
PROT S ACT/NOR PPP: 65 % (ref 63–140)

## 2021-10-10 LAB
B2 GLYCOPROT1 IGA SER-ACNC: >150 GPI IGA UNITS (ref 0–25)
B2 GLYCOPROT1 IGG SER-ACNC: 144 GPI IGG UNITS (ref 0–20)
B2 GLYCOPROT1 IGM SER-ACNC: >150 GPI IGM UNITS (ref 0–32)

## 2021-10-11 LAB — PROT C ACT/NOR PPP CHRO: 123 %

## 2021-10-14 LAB
APTT HEX PL PPP: 75 SEC (ref 0–11)
APTT SCREEN TO CONFIRM RATIO: >2.34 RATIO (ref 0–1.4)
CONFIRM APTT/NORMAL: >200 SEC (ref 0–55)
DRVVT SCREEN TO CONFIRM RATIO: >3.3 RATIO (ref 0.8–1.2)
LA 2 SCREEN W REFLEX-IMP: ABNORMAL
MIXING APTT: 154.9 SEC (ref 0–48.9)
MIXING DRVVT: 153.6 SEC (ref 0–40.4)
SCREEN APTT: >160 SEC (ref 0–51.9)
SCREEN DRVVT: >180 SEC (ref 0–47)
THROMBIN TIME: 18.2 SEC (ref 0–23)

## 2021-11-02 ENCOUNTER — TELEPHONE (OUTPATIENT)
Dept: CARDIOLOGY | Facility: CLINIC | Age: 26
End: 2021-11-02

## 2021-11-02 DIAGNOSIS — I82.4Z3 ACUTE DEEP VEIN THROMBOSIS (DVT) OF DISTAL VEIN OF BOTH LOWER EXTREMITIES (HCC): Primary | ICD-10-CM

## 2021-11-02 NOTE — TELEPHONE ENCOUNTER
Would this patient need to continue this medication or does he need to Stop Xarelto? It looks like he never was given refills and should be taking Xarelto 20 mg QD according to the starter pack that was sent in by ER.

## 2021-11-02 NOTE — TELEPHONE ENCOUNTER
This office does not manage DVTs.  Please remind the patient that we discussed the importance of him following up with primary care provider.  He can have a one-time refill of Xarelto but I never got any more information about his platelet count or any other issues that may be ongoing.  This will not be refilled again.  He needs to call and make that appointment with PCP today or first thing in the morning.

## 2021-11-03 NOTE — TELEPHONE ENCOUNTER
I have the Rx pending to be sent but I would like for him to call back and be informed prior to sending the Rx.

## 2021-11-04 ENCOUNTER — OFFICE VISIT (OUTPATIENT)
Dept: ONCOLOGY | Facility: CLINIC | Age: 26
End: 2021-11-04

## 2021-11-04 VITALS
RESPIRATION RATE: 12 BRPM | DIASTOLIC BLOOD PRESSURE: 76 MMHG | SYSTOLIC BLOOD PRESSURE: 130 MMHG | WEIGHT: 184 LBS | BODY MASS INDEX: 24.92 KG/M2 | OXYGEN SATURATION: 99 % | TEMPERATURE: 97.5 F | HEIGHT: 72 IN | HEART RATE: 101 BPM

## 2021-11-04 DIAGNOSIS — D68.61 ANTIPHOSPHOLIPID SYNDROME (HCC): ICD-10-CM

## 2021-11-04 DIAGNOSIS — I82.4Z3 ACUTE DEEP VEIN THROMBOSIS (DVT) OF DISTAL VEIN OF BOTH LOWER EXTREMITIES (HCC): Primary | ICD-10-CM

## 2021-11-04 DIAGNOSIS — D69.6 THROMBOCYTOPENIA (HCC): ICD-10-CM

## 2021-11-04 PROCEDURE — 99214 OFFICE O/P EST MOD 30 MIN: CPT | Performed by: INTERNAL MEDICINE

## 2021-11-04 NOTE — PROGRESS NOTES
Follow Up Office Visit      Date: 2021     Patient Name: Vargas Reyna  MRN: [id number]  : 1995  Referring Physician: Alysha Noe     Chief Complaint:  Follow-up for antiphospholipid syndrome and thrombocytopenia     History of Present Illness: Vargas Reyna is a pleasant 26 y.o. male the past medical history of bilateral lower extremity DVT who presents today for evaluation of DVT and thrombocytopenia. The patient states that he initially started to develop significant right-sided leg discomfort in July of this year.  He underwent an ultrasound which was notable for a right lower extremity DVT.  He denied any long car or plane rides.  Is not on any hormonal supplementation.  Denies any family history of blood clots.  He was started on Eliquis and completed almost 3 months of treatment when he started to develop left lower extremity pain and swelling.  He again underwent an ultrasound which was notable for a new DVT in the left lower extremity.  He has now been transitioned to Xarelto which she is tolerating well.  In regards to his thrombocytopenia, he has had multiple lab draws with platelet counts between 50-80K.  Per review of outside records, platelets have been clumped with these lab draws and have been adequate numbers during review of smear.  He denies any petechiae or any easy bleeding or bruising at this time.  Denies any family history of thrombocytopenia.    Interval History:  Presents to clinic for follow-up.  Tolerating Xarelto well.  Denies any easy bleeding or bruising episodes.  Denies any worsening lower extremity swelling.  Continues to have arthritic pain specifically in his hands that are worse in the morning as well as leg discomfort.    Oncology History:    Oncology/Hematology History    No history exists.       Subjective      Review of Systems:   Constitutional: Negative for fevers, chills, or weight loss  Eyes: Negative for blurred vision or  "discharge         Ear/Nose/Throat: Negative for difficulty swallowing, sore throat, LAD                                                       Respiratory: Negative for cough, SOA, wheezing                                                                                        Cardiovascular: Negative for chest pain or palpitations                                                                  Gastrointestinal: Negative for nausea, vomiting or diarrhea                                                                     Genitourinary: Negative for dysuria or hematuria                                                                                           Musculoskeletal: Negative for any joint pains or muscle aches                                                                        Neurologic: Negative for any weakness, headaches, dizziness                                                                         Hematologic: Negative for any easy bleeding or bruising                                                                                   Psychiatric: Negative for anxiety or depression                          Past Medical History/Past Surgical History/ Family History/ Social History: Reviewed by me and unchanged from my previous documentation done on October 2021.     Medications:     Current Outpatient Medications:   •  HYDROcodone-acetaminophen (NORCO) 5-325 MG per tablet, Take 1 tablet by mouth Every 6 (Six) Hours As Needed., Disp: , Rfl:   •  rivaroxaban (XARELTO) 20 MG tablet, Take 1 tablet by mouth Daily., Disp: 90 tablet, Rfl: 3    Allergies:   No Known Allergies    Objective     Physical Exam:  Vital Signs:   Vitals:    11/04/21 1501   BP: 130/76   Pulse: 101   Resp: 12   Temp: 97.5 °F (36.4 °C)   TempSrc: Temporal   SpO2: 99%   Weight: 83.5 kg (184 lb)   Height: 182.9 cm (72\")   PainSc: 0-No pain     Pain Score    11/04/21 1501   PainSc: 0-No pain     ECOG Performance Status: 0 - " Asymptomatic    Constitutional: NAD, ECOG 0  Eyes: PERRLA, scleral anicteric  ENT: No LAD, no thyromegaly  Respiratory: CTAB, no wheezing, rales, rhonchi  Cardiovascular: RRR, no murmurs, pulses 2+ bilaterally  Abdomen: soft, NT/ND, no HSM  Musculoskeletal: strength 5/5 bilaterally, no c/c/e  Neurologic: A&O x 3, CN II-XII intact grossly    Results Review:   No visits with results within 2 Week(s) from this visit.   Latest known visit with results is:   Lab on 10/07/2021   Component Date Value Ref Range Status   • Pathology Review 10/07/2021 Yes   Final   • Glucose 10/07/2021 89  65 - 99 mg/dL Final   • BUN 10/07/2021 9  6 - 20 mg/dL Final   • Creatinine 10/07/2021 0.75* 0.76 - 1.27 mg/dL Final   • Sodium 10/07/2021 138  136 - 145 mmol/L Final   • Potassium 10/07/2021 4.1  3.5 - 5.2 mmol/L Final   • Chloride 10/07/2021 101  98 - 107 mmol/L Final   • CO2 10/07/2021 24.7  22.0 - 29.0 mmol/L Final   • Calcium 10/07/2021 10.1  8.6 - 10.5 mg/dL Final   • Total Protein 10/07/2021 8.5  6.0 - 8.5 g/dL Final   • Albumin 10/07/2021 4.80  3.50 - 5.20 g/dL Final   • ALT (SGPT) 10/07/2021 33  1 - 41 U/L Final   • AST (SGOT) 10/07/2021 25  1 - 40 U/L Final   • Alkaline Phosphatase 10/07/2021 59  39 - 117 U/L Final   • Total Bilirubin 10/07/2021 0.4  0.0 - 1.2 mg/dL Final   • eGFR Non African Amer 10/07/2021 128  >60 mL/min/1.73 Final   • Globulin 10/07/2021 3.7  gm/dL Final   • A/G Ratio 10/07/2021 1.3  g/dL Final   • BUN/Creatinine Ratio 10/07/2021 12.0  7.0 - 25.0 Final   • Anion Gap 10/07/2021 12.3  5.0 - 15.0 mmol/L Final   • LDH 10/07/2021 233* 135 - 225 U/L Final   • Haptoglobin 10/07/2021 192  30 - 200 mg/dL Final   • Vitamin B-12 10/07/2021 456  211 - 946 pg/mL Final   • Folate 10/07/2021 12.30  4.78 - 24.20 ng/mL Final   • Ferritin 10/07/2021 98.70  30.00 - 400.00 ng/mL Final   • Iron 10/07/2021 78  59 - 158 mcg/dL Final   • Iron Saturation 10/07/2021 22  20 - 50 % Final   • Transferrin 10/07/2021 238  200 - 360 mg/dL  Final   • TIBC 10/07/2021 355  298 - 536 mcg/dL Final   • Anticardiolipin IgG 10/07/2021 80* 0 - 14 GPL U/mL Final                              Negative:              <15                            Indeterminate:     15 - 20                            Low-Med Positive: >20 - 80                            High Positive:         >80   • Anticardiolipin IgM 10/07/2021 >150* 0 - 12 MPL U/mL Final                              Negative:              <13                            Indeterminate:     13 - 20                            Low-Med Positive: >20 - 80                            High Positive:         >80   • AntiThromb III Func 10/07/2021 120  75 - 135 % Final    Direct Xa inhibitor anticoagulants such as rivaroxaban, apixaban and  edoxaban will lead to spuriously elevated antithrombin activity  levels possibly masking a deficiency.   • Beta-2 Glyco 1 IgG 10/07/2021 144* 0 - 20 GPI IgG units Final    The reference interval reflects a 3SD or 99th percentile interval,  which is thought to represent a potentially clinically significant  result in accordance with the International Consensus Statement on  the classification criteria for definitive antiphospholipid syndrome  (APS). J Thromb Haem 2006;4:295-306.   • Beta-2 Glyco 1 IgA 10/07/2021 >150* 0 - 25 GPI IgA units Final    **Verified by repeat analysis**  The reference interval reflects a 3SD or 99th percentile interval,  which is thought to represent a potentially clinically significant  result in accordance with the International Consensus Statement on  the classification criteria for definitive antiphospholipid syndrome  (APS). J Thromb Haem 2006;4:295-306.   • Beta-2 Glyco 1 IgM 10/07/2021 >150* 0 - 32 GPI IgM units Final    **Verified by repeat analysis**  The reference interval reflects a 3SD or 99th percentile interval,  which is thought to represent a potentially clinically significant  result in accordance with the International Consensus Statement  on  the classification criteria for definitive antiphospholipid syndrome  (APS). J Thromb Haem 2006;4:295-306.   • Factor V Leiden 10/07/2021 Normal  Normal Final   • Dilute Prothrombin Time(dPT) 10/07/2021 >200.0* 0.0 - 55.0 sec Final    **Effective October 25 , 2021 Dilute Prothrombin Time**    (dPT) reference interval will be changing to:                                         0.0 - 47.6 sec.   • dPT Confirm Ratio 10/07/2021 >2.34* 0.00 - 1.40 Ratio Final    **Effective October 25, 2021 dPT Confirm Ratio**    reference interval will be changing to:                                        0.00 - 1.34 ratio   • Thrombin Time 10/07/2021 18.2  0.0 - 23.0 sec Final   • PTT Lupus Anticoagulant 10/07/2021 >160.0* 0.0 - 51.9 sec Final   • Dilute Viper Venom Time 10/07/2021 >180.0* 0.0 - 47.0 sec Final   • Lupus Anticoagulant Reflex 10/07/2021 Comment*  Final    Results are consistent with the presence of a lupus anticoagulant.  NOTE: Only persistent lupus anticoagulants are thought to be of  clinical significance. For this reason, repeat testing in 12 or more  weeks after an initial positive result should be considered to confirm  or refute the presence of a lupus anticoagulant, depending on clinical  presentation. Results of lupus anticoagulant tests may be falsely  positive in the presence of certain anticoagulant therapies.   • Factor II, DNA Analysis 10/07/2021 Normal  Normal Final   • Protein S-Functional 10/07/2021 65  63 - 140 % Final    Protein S activity may be falsely increased (masking an abnormal, low  result) in patients receiving direct Xa inhibitor (e.g., rivaroxaban,  apixaban, edoxaban) or a direct thrombin inhibitor (e.g., dabigatran)  anticoagulant treatment due to assay interference by these drugs.   • Protein C Antigen 10/07/2021 85  60 - 150 % Final   • Prt C Activity (Chromogenic) 10/07/2021 123  % Final    Reference Range:  17 years and older: 73 - 180   • WBC 10/07/2021 7.71  3.40 - 10.80  10*3/mm3 Final   • RBC 10/07/2021 5.25  4.14 - 5.80 10*6/mm3 Final   • Hemoglobin 10/07/2021 15.0  13.0 - 17.7 g/dL Final   • Hematocrit 10/07/2021 45.2  37.5 - 51.0 % Final   • MCV 10/07/2021 86.1  79.0 - 97.0 fL Final   • MCH 10/07/2021 28.6  26.6 - 33.0 pg Final   • MCHC 10/07/2021 33.2  31.5 - 35.7 g/dL Final   • RDW 10/07/2021 13.1  12.3 - 15.4 % Final   • RDW-SD 10/07/2021 40.7  37.0 - 54.0 fl Final   • MPV 10/07/2021 11.3  6.0 - 12.0 fL Final   • Platelets 10/07/2021 90* 140 - 450 10*3/mm3 Final   • Neutrophil % 10/07/2021 61.0  42.7 - 76.0 % Final   • Lymphocyte % 10/07/2021 30.0  19.6 - 45.3 % Final   • Monocyte % 10/07/2021 7.0  5.0 - 12.0 % Final   • Eosinophil % 10/07/2021 2.0  0.3 - 6.2 % Final   • Neutrophils Absolute 10/07/2021 4.70  1.70 - 7.00 10*3/mm3 Final   • Lymphocytes Absolute 10/07/2021 2.31  0.70 - 3.10 10*3/mm3 Final   • Monocytes Absolute 10/07/2021 0.54  0.10 - 0.90 10*3/mm3 Final   • Eosinophils Absolute 10/07/2021 0.15  0.00 - 0.40 10*3/mm3 Final   • RBC Morphology 10/07/2021 Normal  Normal Final   • WBC Morphology 10/07/2021 Normal  Normal Final   • Platelet Morphology 10/07/2021 Normal  Normal Final   • Final Diagnosis 10/07/2021    Final                    Value:This result contains rich text formatting which cannot be displayed here.   • Gross Description 10/07/2021    Final                    Value:This result contains rich text formatting which cannot be displayed here.   • Case Report 10/07/2021    Final                    Value:Surgical Pathology Report                         Case: [id number]                                  Authorizing Provider:  Barry Clark MD      Collected:           10/07/2021 02:32 PM          Ordering Location:     [facility]    Received:            10/08/2021 12:32 AM                                 OUTPAT LAB                                                                   Pathologist:           Ciara Love MD                                                     Specimen:    Blood, Venous Line, 2 smears                                                              • PTT LA MIX 10/07/2021 154.9* 0.0 - 48.9 sec Final   • Hexagonal Phase Phospholipid 10/07/2021 75* 0 - 11 sec Final   • Dilute Viper Venom 1:1 Mix 10/07/2021 153.6* 0.0 - 40.4 sec Final   • Dilute Viper Venom Time 10/07/2021 >3.3* 0.8 - 1.2 ratio Final       No results found.    Assessment / Plan      Assessment/Plan:   1. 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  No indication for platelet transfusion or bone marrow biopsy     2. Acute deep vein thrombosis (DVT) of distal vein of both lower extremities (HCC)  -Patient with 2 unprovoked DVTs.  First occurring in the right lower extremity for which he was treated with Eliquis x2.5 months before developing a left lower extremity DVT  -Hypercoagulable work-up consistent with antiphospholipid syndrome  -We will need to continue Xarelto indefinitely  -Refilled Xarelto today    3. Antiphospholipid syndrome (HCC)  -Patient with a positive lupus anticoagulant, anticardiolipins, and beta-2 glycoprotein's  -We will need to continue indefinite anticoagulation as above  -Referred to rheumatology for work-up of autoimmune disease   -     Ambulatory Referral to Rheumatology    Follow Up:   Follow-up in 6 months     Barry Clark MD  Hematology and Oncology     Please note that portions of this note may have been completed with a voice recognition program. Efforts were made to edit the dictations, but occasionally words are mistranscribed.

## 2021-12-17 ENCOUNTER — APPOINTMENT (OUTPATIENT)
Dept: CT IMAGING | Facility: HOSPITAL | Age: 26
End: 2021-12-17

## 2021-12-17 ENCOUNTER — APPOINTMENT (OUTPATIENT)
Dept: GENERAL RADIOLOGY | Facility: HOSPITAL | Age: 26
End: 2021-12-17

## 2021-12-17 ENCOUNTER — HOSPITAL ENCOUNTER (EMERGENCY)
Facility: HOSPITAL | Age: 26
Discharge: HOME | End: 2021-12-18
Attending: EMERGENCY MEDICINE | Admitting: EMERGENCY MEDICINE

## 2021-12-17 DIAGNOSIS — Z86.718 HISTORY OF DVT (DEEP VEIN THROMBOSIS): ICD-10-CM

## 2021-12-17 DIAGNOSIS — R07.9 CHEST PAIN, UNSPECIFIED TYPE: Primary | ICD-10-CM

## 2021-12-17 LAB
ALBUMIN SERPL-MCNC: 4.6 G/DL (ref 3.5–5.2)
ALBUMIN/GLOB SERPL: 1.1 G/DL
ALP SERPL-CCNC: 67 U/L (ref 39–117)
ALT SERPL W P-5'-P-CCNC: 23 U/L (ref 1–41)
ANION GAP SERPL CALCULATED.3IONS-SCNC: 12.1 MMOL/L (ref 5–15)
AST SERPL-CCNC: 19 U/L (ref 1–40)
BASOPHILS # BLD AUTO: 0.06 10*3/MM3 (ref 0–0.2)
BASOPHILS NFR BLD AUTO: 0.5 % (ref 0–1.5)
BILIRUB SERPL-MCNC: 0.3 MG/DL (ref 0–1.2)
BUN SERPL-MCNC: 11 MG/DL (ref 6–20)
BUN/CREAT SERPL: 12.2 (ref 7–25)
CALCIUM SPEC-SCNC: 9.4 MG/DL (ref 8.6–10.5)
CHLORIDE SERPL-SCNC: 101 MMOL/L (ref 98–107)
CO2 SERPL-SCNC: 24.9 MMOL/L (ref 22–29)
CREAT SERPL-MCNC: 0.9 MG/DL (ref 0.76–1.27)
D DIMER PPP FEU-MCNC: 3.76 MCGFEU/ML (ref 0–0.57)
DEPRECATED RDW RBC AUTO: 43.4 FL (ref 37–54)
EOSINOPHIL # BLD AUTO: 0.16 10*3/MM3 (ref 0–0.4)
EOSINOPHIL NFR BLD AUTO: 1.4 % (ref 0.3–6.2)
ERYTHROCYTE [DISTWIDTH] IN BLOOD BY AUTOMATED COUNT: 13.3 % (ref 12.3–15.4)
GFR SERPL CREATININE-BSD FRML MDRD: 104 ML/MIN/1.73
GLOBULIN UR ELPH-MCNC: 4.3 GM/DL
GLUCOSE SERPL-MCNC: 99 MG/DL (ref 65–99)
HCT VFR BLD AUTO: 45.7 % (ref 37.5–51)
HGB BLD-MCNC: 15 G/DL (ref 13–17.7)
HOLD SPECIMEN: NORMAL
HOLD SPECIMEN: NORMAL
IMM GRANULOCYTES # BLD AUTO: 0.03 10*3/MM3 (ref 0–0.05)
IMM GRANULOCYTES NFR BLD AUTO: 0.3 % (ref 0–0.5)
INR PPP: 1.74 (ref 0.9–1.1)
LYMPHOCYTES # BLD AUTO: 2.42 10*3/MM3 (ref 0.7–3.1)
LYMPHOCYTES NFR BLD AUTO: 21.7 % (ref 19.6–45.3)
MCH RBC QN AUTO: 28.8 PG (ref 26.6–33)
MCHC RBC AUTO-ENTMCNC: 32.8 G/DL (ref 31.5–35.7)
MCV RBC AUTO: 87.9 FL (ref 79–97)
MONOCYTES # BLD AUTO: 0.87 10*3/MM3 (ref 0.1–0.9)
MONOCYTES NFR BLD AUTO: 7.8 % (ref 5–12)
NEUTROPHILS NFR BLD AUTO: 68.3 % (ref 42.7–76)
NEUTROPHILS NFR BLD AUTO: 7.62 10*3/MM3 (ref 1.7–7)
NRBC BLD AUTO-RTO: 0 /100 WBC (ref 0–0.2)
PLATELET # BLD AUTO: 91 10*3/MM3 (ref 140–450)
PMV BLD AUTO: 10 FL (ref 6–12)
POTASSIUM SERPL-SCNC: 3.4 MMOL/L (ref 3.5–5.2)
PROT SERPL-MCNC: 8.9 G/DL (ref 6–8.5)
PROTHROMBIN TIME: 20.9 SECONDS (ref 12–15.1)
RBC # BLD AUTO: 5.2 10*6/MM3 (ref 4.14–5.8)
SARS-COV-2 RNA PNL SPEC NAA+PROBE: NOT DETECTED
SODIUM SERPL-SCNC: 138 MMOL/L (ref 136–145)
TROPONIN T SERPL-MCNC: <0.01 NG/ML (ref 0–0.03)
WBC NRBC COR # BLD: 11.16 10*3/MM3 (ref 3.4–10.8)
WHOLE BLOOD HOLD SPECIMEN: NORMAL
WHOLE BLOOD HOLD SPECIMEN: NORMAL

## 2021-12-17 PROCEDURE — 85025 COMPLETE CBC W/AUTO DIFF WBC: CPT | Performed by: EMERGENCY MEDICINE

## 2021-12-17 PROCEDURE — 71045 X-RAY EXAM CHEST 1 VIEW: CPT

## 2021-12-17 PROCEDURE — 71275 CT ANGIOGRAPHY CHEST: CPT

## 2021-12-17 PROCEDURE — 85379 FIBRIN DEGRADATION QUANT: CPT | Performed by: EMERGENCY MEDICINE

## 2021-12-17 PROCEDURE — 93005 ELECTROCARDIOGRAM TRACING: CPT | Performed by: PHYSICIAN ASSISTANT

## 2021-12-17 PROCEDURE — 25010000002 IOPAMIDOL 61 % SOLUTION: Performed by: EMERGENCY MEDICINE

## 2021-12-17 PROCEDURE — 85610 PROTHROMBIN TIME: CPT | Performed by: EMERGENCY MEDICINE

## 2021-12-17 PROCEDURE — 99283 EMERGENCY DEPT VISIT LOW MDM: CPT

## 2021-12-17 PROCEDURE — 87635 SARS-COV-2 COVID-19 AMP PRB: CPT | Performed by: PHYSICIAN ASSISTANT

## 2021-12-17 PROCEDURE — 80053 COMPREHEN METABOLIC PANEL: CPT | Performed by: EMERGENCY MEDICINE

## 2021-12-17 PROCEDURE — 84484 ASSAY OF TROPONIN QUANT: CPT | Performed by: PHYSICIAN ASSISTANT

## 2021-12-17 RX ADMIN — IOPAMIDOL 100 ML: 612 INJECTION, SOLUTION INTRAVENOUS at 22:51

## 2021-12-18 VITALS
TEMPERATURE: 98.7 F | SYSTOLIC BLOOD PRESSURE: 127 MMHG | OXYGEN SATURATION: 97 % | WEIGHT: 180 LBS | RESPIRATION RATE: 20 BRPM | HEIGHT: 72 IN | HEART RATE: 81 BPM | BODY MASS INDEX: 24.38 KG/M2 | DIASTOLIC BLOOD PRESSURE: 82 MMHG

## 2021-12-18 NOTE — ED PROVIDER NOTES
Subjective   Patient is a 26 old male with a history of deep vein thrombosis on Xarelto and tobacco use presenting to the ER for evaluation of pain with breathing.  Patient states about 1 to 2 hours ago he had a pain in his chest on the left side that is worse with breathing.  He denies any significant shortness of breath, recent fever, chills, cough, dizziness, syncope, abdominal pain, nausea, emesis, diarrhea.  Denies any new or worsening leg pain or swelling.  Unsure of recent sick contacts.  He states there may be a few Xarelto doses that he has missed.  He does smoke tobacco. Denies any underlying lung disease.  Denies any trauma to the chest          Review of Systems   Constitutional: Negative for chills and fever.   HENT: Negative.    Eyes: Negative.    Respiratory: Negative for cough and shortness of breath.    Cardiovascular: Positive for chest pain.   Gastrointestinal: Negative.    Genitourinary: Negative.    Musculoskeletal: Negative.    Skin: Negative.    Neurological: Negative.    Psychiatric/Behavioral: Negative.        Past Medical History:   Diagnosis Date   • Deep vein thrombosis (HCC)        No Known Allergies    Past Surgical History:   Procedure Laterality Date   • FRACTURE SURGERY     • LEG SURGERY Right     spiral fracture repair three years ago        Family History   Problem Relation Age of Onset   • No Known Problems Mother        Social History     Socioeconomic History   • Marital status: Single   Tobacco Use   • Smoking status: Former Smoker     Packs/day: 0.50     Years: 3.00     Pack years: 1.50     Types: Cigarettes     Quit date: 2021     Years since quittin.4   • Smokeless tobacco: Former User   Vaping Use   • Vaping Use: Former   • Substances: years ago   Substance and Sexual Activity   • Alcohol use: Not Currently     Comment: social   • Drug use: Yes     Types: Marijuana     Comment: few times daily   • Sexual activity: Defer           Objective   Physical Exam  Vitals  "and nursing note reviewed.     /82   Pulse 84   Temp 98.7 °F (37.1 °C) (Oral)   Resp 20   Ht 182.9 cm (72\")   Wt 81.6 kg (180 lb)   SpO2 96%   BMI 24.41 kg/m²     GEN: No acute distress, sitting upright in stretcher.  He is awake and alert.  He does not appear septic or toxic.  He is answering questions appropriately.  Head: Normocephalic, atraumatic  Eyes: EOM intact  ENT: Mask in place per protocol  Chest: Nontender to palpation  Cardiovascular: Regular rate and rhythm  Lungs: Clear to auscultation bilaterally without adventitious sounds  Abdomen: Soft, nontender, nondistended, no peritoneal signs, no guarding  Extremities: No edema, normal appearance. Full range of motion, no calf tenderness, distal pulses are intact  Neuro: GCS 15  Psych: Mood and affect are appropriate    Procedures           ED Course  ED Course as of 12/18/21 0024   Fri Dec 17, 2021   2155 Troponin T: <0.010 [LA]   2155 Glucose: 99 [LA]   2155 BUN: 11 [LA]   2155 Creatinine: 0.90 [LA]   2155 Sodium: 138 [LA]   2155 Potassium(!): 3.4 [LA]   2155 Chloride: 101 [LA]   2155 CO2: 24.9 [LA]   2155 Calcium: 9.4 [LA]   2155 Total Protein(!): 8.9 [LA]   2155 Albumin: 4.60 [LA]   2155 ALT (SGPT): 23 [LA]   2155 Alkaline Phosphatase: 67 [LA]   2155 Total Bilirubin: 0.3 [LA]   2155 eGFR Non  Am: 104 [LA]   2155 Globulin: 4.3 [LA]   2155 A/G Ratio: 1.1 [LA]   2155 BUN/Creatinine Ratio: 12.2 [LA]   2155 Anion Gap: 12.1 [LA]   2159 INR(!): 1.74 [LA]   2159 Protime(!): 20.9 [LA]   2231 D-Dimer, Quant(!!): 3.76 [LA]   2245 Patient care handed to Dr. Valentine awaiting CT scan. [LA]   2340 TECHNIQUE:  Thin section axial images were obtained through the chest and  during the arterial phase of IV contrast administration. Coronal  3-D MIP reconstructed images were also provided.     CLINICAL HISTORY:  Pain with breathing, positive d-dimer, history of d-dimer     FINDINGS:  The pulmonary arteries are well-opacified and there is no  filling defect " to indicate pulmonary embolism. The thoracic  aorta is normal. The lungs are clear. There is no pleural  disease, adenopathy or significant osseous abnormality.     IMPRESSION:  No pulmonary embolism.     Authenticated by Alek Avila M.D. on 12/17/2021 11:32:23 PM          Specimen Collected: 12/17/21 23:32 Last Resulted: 12/17/21 23:32         [PF]      ED Course User Index  [LA] Sayra Merecdes PA-C  [PF] Tonny Valentine,                                                  MDM  Number of Diagnoses or Management Options  Diagnosis management comments: On arrival, patient stable.  patient is normotensive, afebrile, saturating 96% on room air.  There is no tachycardia.  Differential could include ACS, cardiac dysrhythmia, pneumothorax, electrolyte abnormalities.  Lower concern for pulmonary embolism given he is not tachycardic or hypoxic and is on Xarelto, but he has had a history of DVT.  Will check D-dimer, basic labs, troponin, chest x-ray and EKG.  Also obtain COVID-19 swab.    EKG was interpreted by the attending.  His labs were stable but did have an elevated D-dimer.  I reviewed the chest x-ray and not seen the significant acute cardiopulmonary abnormality.  Will obtain CT PE protocol.  Patient care will be handed to Dr. Valentine while awaiting official CT read.       Amount and/or Complexity of Data Reviewed  Clinical lab tests: reviewed      00:24 EST  I received care from physicians assistant in regards to follow-up of CT chest.  No evidence of PE no acute findings per radiologist.  Patient be discharged in stable condition continuous home Xarelto, outpatient follow-up return precautions discussed.  Final diagnoses:   Chest pain, unspecified type   History of DVT (deep vein thrombosis)       ED Disposition  ED Disposition     ED Disposition Condition Comment    Discharge Stable           Provider, No Known  [facility] [address]  [phone]          [facility]  [address] [facility] [address] [phone]    As needed, If symptoms worsen         Medication List      No changes were made to your prescriptions during this visit.          Tonny Valentine,   12/18/21 0024

## 2021-12-31 ENCOUNTER — HOSPITAL ENCOUNTER (EMERGENCY)
Facility: HOSPITAL | Age: 26
Discharge: HOME | End: 2021-12-31
Attending: EMERGENCY MEDICINE | Admitting: EMERGENCY MEDICINE

## 2021-12-31 VITALS
HEIGHT: 72 IN | WEIGHT: 180 LBS | BODY MASS INDEX: 24.38 KG/M2 | OXYGEN SATURATION: 98 % | TEMPERATURE: 95 F | RESPIRATION RATE: 16 BRPM | DIASTOLIC BLOOD PRESSURE: 60 MMHG | HEART RATE: 71 BPM | SYSTOLIC BLOOD PRESSURE: 108 MMHG

## 2021-12-31 DIAGNOSIS — R11.2 NON-INTRACTABLE VOMITING WITH NAUSEA, UNSPECIFIED VOMITING TYPE: Primary | ICD-10-CM

## 2021-12-31 LAB
ALBUMIN SERPL-MCNC: 4.5 G/DL (ref 3.5–5.2)
ALBUMIN/GLOB SERPL: 1.2 G/DL
ALP SERPL-CCNC: 67 U/L (ref 39–117)
ALT SERPL W P-5'-P-CCNC: 27 U/L (ref 1–41)
ANION GAP SERPL CALCULATED.3IONS-SCNC: 11.2 MMOL/L (ref 5–15)
AST SERPL-CCNC: 22 U/L (ref 1–40)
BASOPHILS # BLD AUTO: 0.07 10*3/MM3 (ref 0–0.2)
BASOPHILS NFR BLD AUTO: 0.8 % (ref 0–1.5)
BILIRUB SERPL-MCNC: 0.2 MG/DL (ref 0–1.2)
BILIRUB UR QL STRIP: NEGATIVE
BUN SERPL-MCNC: 11 MG/DL (ref 6–20)
BUN/CREAT SERPL: 13.9 (ref 7–25)
CALCIUM SPEC-SCNC: 9.6 MG/DL (ref 8.6–10.5)
CHLORIDE SERPL-SCNC: 102 MMOL/L (ref 98–107)
CLARITY UR: CLEAR
CO2 SERPL-SCNC: 24.8 MMOL/L (ref 22–29)
COLOR UR: YELLOW
CREAT SERPL-MCNC: 0.79 MG/DL (ref 0.76–1.27)
DEPRECATED RDW RBC AUTO: 41.4 FL (ref 37–54)
EOSINOPHIL # BLD AUTO: 0.31 10*3/MM3 (ref 0–0.4)
EOSINOPHIL NFR BLD AUTO: 3.5 % (ref 0.3–6.2)
ERYTHROCYTE [DISTWIDTH] IN BLOOD BY AUTOMATED COUNT: 13.2 % (ref 12.3–15.4)
GFR SERPL CREATININE-BSD FRML MDRD: 121 ML/MIN/1.73
GLOBULIN UR ELPH-MCNC: 3.8 GM/DL
GLUCOSE BLDC GLUCOMTR-MCNC: 97 MG/DL (ref 70–130)
GLUCOSE SERPL-MCNC: 120 MG/DL (ref 65–99)
GLUCOSE UR STRIP-MCNC: NEGATIVE MG/DL
HCT VFR BLD AUTO: 41.4 % (ref 37.5–51)
HGB BLD-MCNC: 13.9 G/DL (ref 13–17.7)
HGB UR QL STRIP.AUTO: NEGATIVE
HOLD SPECIMEN: NORMAL
HOLD SPECIMEN: NORMAL
IMM GRANULOCYTES # BLD AUTO: 0.04 10*3/MM3 (ref 0–0.05)
IMM GRANULOCYTES NFR BLD AUTO: 0.5 % (ref 0–0.5)
KETONES UR QL STRIP: NEGATIVE
LEUKOCYTE ESTERASE UR QL STRIP.AUTO: NEGATIVE
LIPASE SERPL-CCNC: 31 U/L (ref 13–60)
LYMPHOCYTES # BLD AUTO: 3.45 10*3/MM3 (ref 0.7–3.1)
LYMPHOCYTES NFR BLD AUTO: 39 % (ref 19.6–45.3)
MCH RBC QN AUTO: 29.2 PG (ref 26.6–33)
MCHC RBC AUTO-ENTMCNC: 33.6 G/DL (ref 31.5–35.7)
MCV RBC AUTO: 87 FL (ref 79–97)
MONOCYTES # BLD AUTO: 0.74 10*3/MM3 (ref 0.1–0.9)
MONOCYTES NFR BLD AUTO: 8.4 % (ref 5–12)
NEUTROPHILS NFR BLD AUTO: 4.23 10*3/MM3 (ref 1.7–7)
NEUTROPHILS NFR BLD AUTO: 47.8 % (ref 42.7–76)
NITRITE UR QL STRIP: NEGATIVE
NRBC BLD AUTO-RTO: 0 /100 WBC (ref 0–0.2)
PH UR STRIP.AUTO: 7.5 [PH] (ref 5–8)
PLATELET # BLD AUTO: 91 10*3/MM3 (ref 140–450)
PMV BLD AUTO: 10.7 FL (ref 6–12)
POTASSIUM SERPL-SCNC: 3.2 MMOL/L (ref 3.5–5.2)
PROT SERPL-MCNC: 8.3 G/DL (ref 6–8.5)
PROT UR QL STRIP: NEGATIVE
RBC # BLD AUTO: 4.76 10*6/MM3 (ref 4.14–5.8)
SODIUM SERPL-SCNC: 138 MMOL/L (ref 136–145)
SP GR UR STRIP: <=1.005 (ref 1–1.03)
UROBILINOGEN UR QL STRIP: NORMAL
WBC NRBC COR # BLD: 8.84 10*3/MM3 (ref 3.4–10.8)
WHOLE BLOOD HOLD SPECIMEN: NORMAL
WHOLE BLOOD HOLD SPECIMEN: NORMAL

## 2021-12-31 PROCEDURE — 83690 ASSAY OF LIPASE: CPT | Performed by: EMERGENCY MEDICINE

## 2021-12-31 PROCEDURE — 99283 EMERGENCY DEPT VISIT LOW MDM: CPT

## 2021-12-31 PROCEDURE — 85025 COMPLETE CBC W/AUTO DIFF WBC: CPT | Performed by: EMERGENCY MEDICINE

## 2021-12-31 PROCEDURE — 81003 URINALYSIS AUTO W/O SCOPE: CPT | Performed by: EMERGENCY MEDICINE

## 2021-12-31 PROCEDURE — 82962 GLUCOSE BLOOD TEST: CPT

## 2021-12-31 PROCEDURE — 25010000002 DIPHENHYDRAMINE PER 50 MG: Performed by: EMERGENCY MEDICINE

## 2021-12-31 PROCEDURE — 96375 TX/PRO/DX INJ NEW DRUG ADDON: CPT

## 2021-12-31 PROCEDURE — 93005 ELECTROCARDIOGRAM TRACING: CPT | Performed by: EMERGENCY MEDICINE

## 2021-12-31 PROCEDURE — 80053 COMPREHEN METABOLIC PANEL: CPT | Performed by: EMERGENCY MEDICINE

## 2021-12-31 PROCEDURE — 25010000002 DROPERIDOL PER 5 MG: Performed by: EMERGENCY MEDICINE

## 2021-12-31 PROCEDURE — 96374 THER/PROPH/DIAG INJ IV PUSH: CPT

## 2021-12-31 RX ORDER — PROMETHAZINE HYDROCHLORIDE 25 MG/1
25 TABLET ORAL EVERY 6 HOURS PRN
Qty: 12 TABLET | Refills: 0 | Status: SHIPPED | OUTPATIENT
Start: 2021-12-31 | End: 2022-05-05

## 2021-12-31 RX ORDER — SODIUM CHLORIDE 0.9 % (FLUSH) 0.9 %
10 SYRINGE (ML) INJECTION AS NEEDED
Status: DISCONTINUED | OUTPATIENT
Start: 2021-12-31 | End: 2021-12-31 | Stop reason: HOSPADM

## 2021-12-31 RX ORDER — DIPHENHYDRAMINE HYDROCHLORIDE 50 MG/ML
25 INJECTION INTRAMUSCULAR; INTRAVENOUS ONCE
Status: COMPLETED | OUTPATIENT
Start: 2021-12-31 | End: 2021-12-31

## 2021-12-31 RX ORDER — DROPERIDOL 2.5 MG/ML
1.25 INJECTION, SOLUTION INTRAMUSCULAR; INTRAVENOUS ONCE
Status: COMPLETED | OUTPATIENT
Start: 2021-12-31 | End: 2021-12-31

## 2021-12-31 RX ADMIN — SODIUM CHLORIDE 1000 ML: 9 INJECTION, SOLUTION INTRAVENOUS at 04:33

## 2021-12-31 RX ADMIN — DROPERIDOL 1.25 MG: 2.5 INJECTION, SOLUTION INTRAMUSCULAR; INTRAVENOUS at 04:34

## 2021-12-31 RX ADMIN — DIPHENHYDRAMINE HYDROCHLORIDE 25 MG: 50 INJECTION INTRAMUSCULAR; INTRAVENOUS at 04:33

## 2022-01-12 NOTE — ED PROVIDER NOTES
Subjective   History of Present Illness    Chief Complaint: Nausea vomiting  History of Present Illness: 26-year-old male presents with nausea and vomiting.  Generalized weakness sweats  Onset: 1 day  Duration: Persistent  Exacerbating / Alleviating factors: None  Associated symptoms: None      Nurses Notes reviewed and agree, including vitals, allergies, social history and prior medical history.     REVIEW OF SYSTEMS: All systems reviewed and not pertinent unless noted.    Positive for: Nausea vomiting generalized weakness sweats    Negative for: Fever cough hemoptysis and GI bleeding flank pain  Review of Systems    Past Medical History:   Diagnosis Date   • Deep vein thrombosis (HCC)        No Known Allergies    Past Surgical History:   Procedure Laterality Date   • FRACTURE SURGERY     • LEG SURGERY Right     spiral fracture repair three years ago        Family History   Problem Relation Age of Onset   • No Known Problems Mother        Social History     Socioeconomic History   • Marital status: Single   Tobacco Use   • Smoking status: Current Every Day Smoker     Packs/day: 0.50     Years: 3.00     Pack years: 1.50     Types: Cigarettes     Last attempt to quit: 2021     Years since quittin.4   • Smokeless tobacco: Former User   Vaping Use   • Vaping Use: Former   • Substances: years ago   Substance and Sexual Activity   • Alcohol use: Not Currently     Comment: social   • Drug use: Yes     Types: Marijuana     Comment: few times daily   • Sexual activity: Defer           Objective   Physical Exam    CONSTITUTIONAL: Well developed, healthy appearing nontoxic 26-year-old male,  in no acute distress.  VITAL SIGNS: per nursing, reviewed and noted  SKIN: exposed skin with no rashes, ulcerations or petechiae.  EYES: perrla. EOMI.  ENT: Normal voice.  Patient maintained wearing a mask throughout patient encounter due to coronavirus pandemic  RESPIRATORY:  No increased work of breathing. No retractions.    CARDIOVASCULAR:  regular rate and rhythm, no murmurs.  Good Peripheral pulses. Good cap refill to extremities.   GI: Abdomen soft, nontender, normal bowel sounds. No hernia. No ascites.  MUSCULOSKELETAL:  No tenderness. Full ROM. Strength and tone grossly normal.  no spasms. no neck or back tenderness or spasm.   NEUROLOGIC: Alert, oriented x 3. No gross deficits. GCS 15.   PSYCH: appropriate affect.  : no bladder tenderness or distention, no CVA tenderness      Procedures     No attending physician procedures were performed on this patient.      ED Course  ED Course as of 01/12/22 0745   Fri Dec 31, 2021   0446 EKG interpreted by me reveals sinus bradycardia rate 59.    No ectopy no ischemic changes [PF]      ED Course User Index  [PF] Tonny Valentine W, DO                                                 MDM  26-year-old male presents with nausea vomiting.  Normotensive no tachycardia no murmur sats 100%.  CBC CMP UA lipase without acute findings.  Received IV fluids Benadryl Inapsine.  Stable for discharge home.  Prescription Phenergan  Final diagnoses:   Non-intractable vomiting with nausea, unspecified vomiting type       ED Disposition  ED Disposition     ED Disposition Condition Comment    Discharge Stable           Provider, No Known  [facility] [address]  [phone]          [facility]  [address] [facility] [address] [phone]    As needed, If symptoms worsen         Medication List      New Prescriptions    promethazine 25 MG tablet  Commonly known as: PHENERGAN  Take 1 tablet by mouth Every 6 (Six) Hours As Needed for Nausea.        Stop    HYDROcodone-acetaminophen 5-325 MG per tablet  Commonly known as: NORCO           Where to Get Your Medications      [facility] [address] [place] [address] - [address] [place] [address] [facility] - [phone]  - [phone]   [address] [facility]  [address]    Phone: [phone]   · promethazine 25 MG tablet          Tonny Valentine DO  01/12/22 0746

## 2022-04-18 ENCOUNTER — APPOINTMENT (OUTPATIENT)
Dept: CT IMAGING | Facility: HOSPITAL | Age: 27
End: 2022-04-18

## 2022-04-18 ENCOUNTER — HOSPITAL ENCOUNTER (EMERGENCY)
Facility: HOSPITAL | Age: 27
Discharge: ANOTHER HOSPITAL | End: 2022-04-19
Attending: EMERGENCY MEDICINE | Admitting: EMERGENCY MEDICINE

## 2022-04-18 ENCOUNTER — APPOINTMENT (OUTPATIENT)
Dept: GENERAL RADIOLOGY | Facility: HOSPITAL | Age: 27
End: 2022-04-18

## 2022-04-18 DIAGNOSIS — N28.0 RENAL INFARCT: Primary | ICD-10-CM

## 2022-04-18 LAB
ALBUMIN SERPL-MCNC: 4.9 G/DL (ref 3.5–5.2)
ALBUMIN/GLOB SERPL: 1.1 G/DL
ALP SERPL-CCNC: 68 U/L (ref 39–117)
ALT SERPL W P-5'-P-CCNC: 31 U/L (ref 1–41)
ANION GAP SERPL CALCULATED.3IONS-SCNC: 13.7 MMOL/L (ref 5–15)
APTT PPP: 103.5 SECONDS (ref 70–100)
AST SERPL-CCNC: 23 U/L (ref 1–40)
BASOPHILS # BLD AUTO: 0.07 10*3/MM3 (ref 0–0.2)
BASOPHILS # BLD AUTO: 0.08 10*3/MM3 (ref 0–0.2)
BASOPHILS NFR BLD AUTO: 0.6 % (ref 0–1.5)
BASOPHILS NFR BLD AUTO: 0.6 % (ref 0–1.5)
BILIRUB SERPL-MCNC: 0.8 MG/DL (ref 0–1.2)
BILIRUB UR QL STRIP: NEGATIVE
BUN SERPL-MCNC: 15 MG/DL (ref 6–20)
BUN/CREAT SERPL: 15.2 (ref 7–25)
CALCIUM SPEC-SCNC: 9.7 MG/DL (ref 8.6–10.5)
CHLORIDE SERPL-SCNC: 97 MMOL/L (ref 98–107)
CLARITY UR: CLEAR
CO2 SERPL-SCNC: 23.3 MMOL/L (ref 22–29)
COLOR UR: YELLOW
CREAT SERPL-MCNC: 0.99 MG/DL (ref 0.76–1.27)
D DIMER PPP FEU-MCNC: 0.6 MCGFEU/ML (ref 0–0.57)
DEPRECATED RDW RBC AUTO: 38.9 FL (ref 37–54)
DEPRECATED RDW RBC AUTO: 39.2 FL (ref 37–54)
EGFRCR SERPLBLD CKD-EPI 2021: 109.1 ML/MIN/1.73
EOSINOPHIL # BLD AUTO: 0.08 10*3/MM3 (ref 0–0.4)
EOSINOPHIL # BLD AUTO: 0.11 10*3/MM3 (ref 0–0.4)
EOSINOPHIL NFR BLD AUTO: 0.6 % (ref 0.3–6.2)
EOSINOPHIL NFR BLD AUTO: 0.9 % (ref 0.3–6.2)
ERYTHROCYTE [DISTWIDTH] IN BLOOD BY AUTOMATED COUNT: 12.4 % (ref 12.3–15.4)
ERYTHROCYTE [DISTWIDTH] IN BLOOD BY AUTOMATED COUNT: 12.5 % (ref 12.3–15.4)
GLOBULIN UR ELPH-MCNC: 4.3 GM/DL
GLUCOSE SERPL-MCNC: 94 MG/DL (ref 65–99)
GLUCOSE UR STRIP-MCNC: NEGATIVE MG/DL
HCT VFR BLD AUTO: 41.9 % (ref 37.5–51)
HCT VFR BLD AUTO: 42 % (ref 37.5–51)
HGB BLD-MCNC: 14.2 G/DL (ref 13–17.7)
HGB BLD-MCNC: 14.3 G/DL (ref 13–17.7)
HGB UR QL STRIP.AUTO: NEGATIVE
HOLD SPECIMEN: NORMAL
HOLD SPECIMEN: NORMAL
IMM GRANULOCYTES # BLD AUTO: 0.03 10*3/MM3 (ref 0–0.05)
IMM GRANULOCYTES # BLD AUTO: 0.04 10*3/MM3 (ref 0–0.05)
IMM GRANULOCYTES NFR BLD AUTO: 0.3 % (ref 0–0.5)
IMM GRANULOCYTES NFR BLD AUTO: 0.3 % (ref 0–0.5)
INR PPP: 1.28 (ref 0.9–1.1)
KETONES UR QL STRIP: ABNORMAL
LEUKOCYTE ESTERASE UR QL STRIP.AUTO: NEGATIVE
LIPASE SERPL-CCNC: 14 U/L (ref 13–60)
LYMPHOCYTES # BLD AUTO: 1.74 10*3/MM3 (ref 0.7–3.1)
LYMPHOCYTES # BLD AUTO: 2.14 10*3/MM3 (ref 0.7–3.1)
LYMPHOCYTES NFR BLD AUTO: 14.8 % (ref 19.6–45.3)
LYMPHOCYTES NFR BLD AUTO: 15.9 % (ref 19.6–45.3)
MCH RBC QN AUTO: 29.2 PG (ref 26.6–33)
MCH RBC QN AUTO: 29.2 PG (ref 26.6–33)
MCHC RBC AUTO-ENTMCNC: 33.8 G/DL (ref 31.5–35.7)
MCHC RBC AUTO-ENTMCNC: 34.1 G/DL (ref 31.5–35.7)
MCV RBC AUTO: 85.5 FL (ref 79–97)
MCV RBC AUTO: 86.2 FL (ref 79–97)
MONOCYTES # BLD AUTO: 1.05 10*3/MM3 (ref 0.1–0.9)
MONOCYTES # BLD AUTO: 1.23 10*3/MM3 (ref 0.1–0.9)
MONOCYTES NFR BLD AUTO: 8.9 % (ref 5–12)
MONOCYTES NFR BLD AUTO: 9.2 % (ref 5–12)
NEUTROPHILS NFR BLD AUTO: 73.4 % (ref 42.7–76)
NEUTROPHILS NFR BLD AUTO: 74.5 % (ref 42.7–76)
NEUTROPHILS NFR BLD AUTO: 8.74 10*3/MM3 (ref 1.7–7)
NEUTROPHILS NFR BLD AUTO: 9.86 10*3/MM3 (ref 1.7–7)
NITRITE UR QL STRIP: NEGATIVE
NRBC BLD AUTO-RTO: 0 /100 WBC (ref 0–0.2)
NRBC BLD AUTO-RTO: 0 /100 WBC (ref 0–0.2)
PH UR STRIP.AUTO: 6 [PH] (ref 5–8)
PLATELET # BLD AUTO: 84 10*3/MM3 (ref 140–450)
PLATELET # BLD AUTO: 90 10*3/MM3 (ref 140–450)
PMV BLD AUTO: 10.5 FL (ref 6–12)
PMV BLD AUTO: 9.9 FL (ref 6–12)
POTASSIUM SERPL-SCNC: 3.4 MMOL/L (ref 3.5–5.2)
PROT SERPL-MCNC: 9.2 G/DL (ref 6–8.5)
PROT UR QL STRIP: ABNORMAL
PROTHROMBIN TIME: 16.4 SECONDS (ref 12.5–14.5)
RBC # BLD AUTO: 4.87 10*6/MM3 (ref 4.14–5.8)
RBC # BLD AUTO: 4.9 10*6/MM3 (ref 4.14–5.8)
SODIUM SERPL-SCNC: 134 MMOL/L (ref 136–145)
SP GR UR STRIP: 1.02 (ref 1–1.03)
TROPONIN T SERPL-MCNC: 0.02 NG/ML (ref 0–0.03)
UROBILINOGEN UR QL STRIP: ABNORMAL
WBC NRBC COR # BLD: 11.74 10*3/MM3 (ref 3.4–10.8)
WBC NRBC COR # BLD: 13.43 10*3/MM3 (ref 3.4–10.8)
WHOLE BLOOD HOLD SPECIMEN: NORMAL
WHOLE BLOOD HOLD SPECIMEN: NORMAL

## 2022-04-18 PROCEDURE — 93005 ELECTROCARDIOGRAM TRACING: CPT | Performed by: NURSE PRACTITIONER

## 2022-04-18 PROCEDURE — 85025 COMPLETE CBC W/AUTO DIFF WBC: CPT | Performed by: NURSE PRACTITIONER

## 2022-04-18 PROCEDURE — 85610 PROTHROMBIN TIME: CPT | Performed by: NURSE PRACTITIONER

## 2022-04-18 PROCEDURE — 85730 THROMBOPLASTIN TIME PARTIAL: CPT | Performed by: NURSE PRACTITIONER

## 2022-04-18 PROCEDURE — 25010000002 IOPAMIDOL 61 % SOLUTION: Performed by: EMERGENCY MEDICINE

## 2022-04-18 PROCEDURE — 80053 COMPREHEN METABOLIC PANEL: CPT | Performed by: NURSE PRACTITIONER

## 2022-04-18 PROCEDURE — 83690 ASSAY OF LIPASE: CPT | Performed by: NURSE PRACTITIONER

## 2022-04-18 PROCEDURE — 25010000002 HEPARIN (PORCINE) PER 1000 UNITS: Performed by: EMERGENCY MEDICINE

## 2022-04-18 PROCEDURE — 84484 ASSAY OF TROPONIN QUANT: CPT | Performed by: NURSE PRACTITIONER

## 2022-04-18 PROCEDURE — 93005 ELECTROCARDIOGRAM TRACING: CPT | Performed by: EMERGENCY MEDICINE

## 2022-04-18 PROCEDURE — 81003 URINALYSIS AUTO W/O SCOPE: CPT | Performed by: NURSE PRACTITIONER

## 2022-04-18 PROCEDURE — 71045 X-RAY EXAM CHEST 1 VIEW: CPT

## 2022-04-18 PROCEDURE — 71275 CT ANGIOGRAPHY CHEST: CPT

## 2022-04-18 PROCEDURE — 74177 CT ABD & PELVIS W/CONTRAST: CPT

## 2022-04-18 PROCEDURE — 96375 TX/PRO/DX INJ NEW DRUG ADDON: CPT

## 2022-04-18 PROCEDURE — 85379 FIBRIN DEGRADATION QUANT: CPT | Performed by: NURSE PRACTITIONER

## 2022-04-18 RX ORDER — SODIUM CHLORIDE 0.9 % (FLUSH) 0.9 %
10 SYRINGE (ML) INJECTION AS NEEDED
Status: DISCONTINUED | OUTPATIENT
Start: 2022-04-18 | End: 2022-04-19 | Stop reason: HOSPADM

## 2022-04-18 RX ORDER — HEPARIN SODIUM 1000 [USP'U]/ML
80 INJECTION, SOLUTION INTRAVENOUS; SUBCUTANEOUS ONCE
Status: COMPLETED | OUTPATIENT
Start: 2022-04-18 | End: 2022-04-18

## 2022-04-18 RX ORDER — HEPARIN SODIUM 1000 [USP'U]/ML
5000 INJECTION, SOLUTION INTRAVENOUS; SUBCUTANEOUS AS NEEDED
Status: DISCONTINUED | OUTPATIENT
Start: 2022-04-18 | End: 2022-04-19 | Stop reason: HOSPADM

## 2022-04-18 RX ORDER — HEPARIN SODIUM 10000 [USP'U]/100ML
18 INJECTION, SOLUTION INTRAVENOUS
Status: DISCONTINUED | OUTPATIENT
Start: 2022-04-18 | End: 2022-04-19 | Stop reason: HOSPADM

## 2022-04-18 RX ORDER — HEPARIN SODIUM 1000 [USP'U]/ML
40 INJECTION, SOLUTION INTRAVENOUS; SUBCUTANEOUS AS NEEDED
Status: DISCONTINUED | OUTPATIENT
Start: 2022-04-18 | End: 2022-04-19 | Stop reason: HOSPADM

## 2022-04-18 RX ADMIN — HEPARIN SODIUM AND DEXTROSE 18 UNITS/KG/HR: 10000; 5 INJECTION INTRAVENOUS at 23:03

## 2022-04-18 RX ADMIN — IOPAMIDOL 100 ML: 612 INJECTION, SOLUTION INTRAVENOUS at 17:47

## 2022-04-18 RX ADMIN — HEPARIN SODIUM 6350 UNITS: 1000 INJECTION INTRAVENOUS; SUBCUTANEOUS at 23:03

## 2022-04-19 VITALS
OXYGEN SATURATION: 96 % | WEIGHT: 175 LBS | BODY MASS INDEX: 23.7 KG/M2 | TEMPERATURE: 97.8 F | HEIGHT: 72 IN | DIASTOLIC BLOOD PRESSURE: 92 MMHG | RESPIRATION RATE: 18 BRPM | SYSTOLIC BLOOD PRESSURE: 129 MMHG | HEART RATE: 90 BPM

## 2022-04-19 LAB — APTT PPP: >200 SECONDS (ref 70–100)

## 2022-04-19 PROCEDURE — 25010000002 HEPARIN (PORCINE) PER 1000 UNITS: Performed by: EMERGENCY MEDICINE

## 2022-04-19 PROCEDURE — 96365 THER/PROPH/DIAG IV INF INIT: CPT

## 2022-04-19 PROCEDURE — 85730 THROMBOPLASTIN TIME PARTIAL: CPT | Performed by: EMERGENCY MEDICINE

## 2022-04-19 PROCEDURE — 99283 EMERGENCY DEPT VISIT LOW MDM: CPT

## 2022-04-19 PROCEDURE — 96366 THER/PROPH/DIAG IV INF ADDON: CPT

## 2022-04-19 RX ORDER — MORPHINE SULFATE 4 MG/ML
4 INJECTION, SOLUTION INTRAMUSCULAR; INTRAVENOUS ONCE
Status: DISCONTINUED | OUTPATIENT
Start: 2022-04-19 | End: 2022-04-19 | Stop reason: HOSPADM

## 2022-04-19 RX ADMIN — HEPARIN SODIUM AND DEXTROSE 15 UNITS/KG/HR: 10000; 5 INJECTION INTRAVENOUS at 07:51

## 2022-05-05 ENCOUNTER — OFFICE VISIT (OUTPATIENT)
Dept: ONCOLOGY | Facility: CLINIC | Age: 27
End: 2022-05-05

## 2022-05-05 VITALS
TEMPERATURE: 97.7 F | WEIGHT: 172 LBS | DIASTOLIC BLOOD PRESSURE: 96 MMHG | SYSTOLIC BLOOD PRESSURE: 131 MMHG | RESPIRATION RATE: 12 BRPM | OXYGEN SATURATION: 99 % | BODY MASS INDEX: 23.3 KG/M2 | HEART RATE: 102 BPM | HEIGHT: 72 IN

## 2022-05-05 DIAGNOSIS — D68.61 ANTIPHOSPHOLIPID SYNDROME: Primary | ICD-10-CM

## 2022-05-05 DIAGNOSIS — I82.4Z3 ACUTE DEEP VEIN THROMBOSIS (DVT) OF DISTAL VEIN OF BOTH LOWER EXTREMITIES: ICD-10-CM

## 2022-05-05 PROCEDURE — 99214 OFFICE O/P EST MOD 30 MIN: CPT | Performed by: INTERNAL MEDICINE

## 2022-05-05 NOTE — PROGRESS NOTES
Follow Up Office Visit      Date: 2022     Patient Name: Vargas Reyna  MRN: [id number]  : 1995  Referring Physician: Alysha Noe     Chief Complaint:  Follow-up for antiphospholipid syndrome and thrombocytopenia     History of Present Illness: Vargas Reyna is a pleasant 26 y.o. male the past medical history of bilateral lower extremity DVT who presents today for evaluation of DVT and thrombocytopenia. The patient states that he initially started to develop significant right-sided leg discomfort in July of this year.  He underwent an ultrasound which was notable for a right lower extremity DVT.  He denied any long car or plane rides.  Is not on any hormonal supplementation.  Denies any family history of blood clots.  He was started on Eliquis and completed almost 3 months of treatment when he started to develop left lower extremity pain and swelling.  He again underwent an ultrasound which was notable for a new DVT in the left lower extremity.  He has now been transitioned to Xarelto which she is tolerating well.  In regards to his thrombocytopenia, he has had multiple lab draws with platelet counts between 50-80K.  Per review of outside records, platelets have been clumped with these lab draws and have been adequate numbers during review of smear.  He denies any petechiae or any easy bleeding or bruising at this time.  Denies any family history of thrombocytopenia.     Interval History:  Presents to clinic for follow-up.    Have been tolerating Xarelto well until mid April when he started to have significant back pain discomfort.  He presented to the ED where he was diagnosed with bilateral renal arterial stenosis.  He was transferred to [place] for potential intervention.  He did not require any interventional therapy at that time.  He was seen by hematology where it was discussed to increase his Xarelto versus switching him to Coumadin.  He elected to increase his  Xarelto to 15 mg twice daily.  He is tolerating this medication well.  Denies any easy bleeding or bruising episodes    Oncology History:    Oncology/Hematology History    No history exists.       Subjective      Review of Systems:   Constitutional: Negative for fevers, chills, or weight loss  Eyes: Negative for blurred vision or discharge         Ear/Nose/Throat: Negative for difficulty swallowing, sore throat, LAD                                                       Respiratory: Negative for cough, SOA, wheezing                                                                                        Cardiovascular: Negative for chest pain or palpitations                                                                  Gastrointestinal: Negative for nausea, vomiting or diarrhea                                                                     Genitourinary: Negative for dysuria or hematuria                                                                                           Musculoskeletal: Negative for any joint pains or muscle aches                                                                        Neurologic: Negative for any weakness, headaches, dizziness                                                                         Hematologic: Negative for any easy bleeding or bruising                                                                                   Psychiatric: Negative for anxiety or depression                          Past Medical History/Past Surgical History/ Family History/ Social History: Reviewed by me and unchanged from my previous documentation done on November 2021.     Medications:     Current Outpatient Medications:   •  rivaroxaban (XARELTO) 15 MG tablet, Take 15 mg by mouth 2 (Two) Times a Day With Meals., Disp: , Rfl:     Allergies:   No Known Allergies    Objective     Physical Exam:  Vital Signs:   Vitals:    05/05/22 1337   BP: 131/96   Pulse: 102   Resp: 12   Temp: 97.7  "°F (36.5 °C)   TempSrc: Temporal   SpO2: 99%   Weight: 78 kg (172 lb)   Height: 182.9 cm (72\")   PainSc: 0-No pain     Pain Score    05/05/22 1337   PainSc: 0-No pain     ECOG Performance Status: 0 - Asymptomatic    Constitutional: NAD, ECOG 0  Eyes: PERRLA, scleral anicteric  ENT: No LAD, no thyromegaly  Respiratory: CTAB, no wheezing, rales, rhonchi  Cardiovascular: RRR, no murmurs, pulses 2+ bilaterally  Abdomen: soft, NT/ND, no HSM  Musculoskeletal: strength 5/5 bilaterally, no c/c/e  Neurologic: A&O x 3, CN II-XII intact grossly    Results Review:   No visits with results within 2 Week(s) from this visit.   Latest known visit with results is:   Admission on 04/18/2022, Discharged on 04/19/2022   Component Date Value Ref Range Status   • Extra Tube 04/18/2022 Hold for add-ons.   Final    Auto resulted.   • Extra Tube 04/18/2022 hold for add-on   Final    Auto resulted   • Extra Tube 04/18/2022 Hold for add-ons.   Final    Auto resulted.   • Extra Tube 04/18/2022 hold for add-on   Final    Auto resulted   • Glucose 04/18/2022 94  65 - 99 mg/dL Final   • BUN 04/18/2022 15  6 - 20 mg/dL Final   • Creatinine 04/18/2022 0.99  0.76 - 1.27 mg/dL Final   • Sodium 04/18/2022 134 (A) 136 - 145 mmol/L Final   • Potassium 04/18/2022 3.4 (A) 3.5 - 5.2 mmol/L Final   • Chloride 04/18/2022 97 (A) 98 - 107 mmol/L Final   • CO2 04/18/2022 23.3  22.0 - 29.0 mmol/L Final   • Calcium 04/18/2022 9.7  8.6 - 10.5 mg/dL Final   • Total Protein 04/18/2022 9.2 (A) 6.0 - 8.5 g/dL Final   • Albumin 04/18/2022 4.90  3.50 - 5.20 g/dL Final   • ALT (SGPT) 04/18/2022 31  1 - 41 U/L Final   • AST (SGOT) 04/18/2022 23  1 - 40 U/L Final   • Alkaline Phosphatase 04/18/2022 68  39 - 117 U/L Final   • Total Bilirubin 04/18/2022 0.8  0.0 - 1.2 mg/dL Final   • Globulin 04/18/2022 4.3  gm/dL Final   • A/G Ratio 04/18/2022 1.1  g/dL Final   • BUN/Creatinine Ratio 04/18/2022 15.2  7.0 - 25.0 Final   • Anion Gap 04/18/2022 13.7  5.0 - 15.0 mmol/L Final "   • eGFR 04/18/2022 109.1  >60.0 mL/min/1.73 Final    National Kidney Foundation and American Society of Nephrology (ASN) Task Force recommended calculation based on the Chronic Kidney Disease Epidemiology Collaboration (CKD-EPI) equation refit without adjustment for race.   • Lipase 04/18/2022 14  13 - 60 U/L Final   • Color, UA 04/18/2022 Yellow  Yellow, Straw Final   • Appearance, UA 04/18/2022 Clear  Clear Final   • pH, UA 04/18/2022 6.0  5.0 - 8.0 Final   • Specific Gravity, UA 04/18/2022 1.023  1.005 - 1.030 Final   • Glucose, UA 04/18/2022 Negative  Negative Final   • Ketones, UA 04/18/2022 15 mg/dL (1+) (A) Negative Final   • Bilirubin, UA 04/18/2022 Negative  Negative Final   • Blood, UA 04/18/2022 Negative  Negative Final   • Protein, UA 04/18/2022 Trace (A) Negative Final   • Leuk Esterase, UA 04/18/2022 Negative  Negative Final   • Nitrite, UA 04/18/2022 Negative  Negative Final   • Urobilinogen, UA 04/18/2022 1.0 E.U./dL  0.2 - 1.0 E.U./dL Final   • D-Dimer, Quantitative 04/18/2022 0.60 (A) 0.00 - 0.57 MCGFEU/mL Final   • Troponin T 04/18/2022 0.024  0.000 - 0.030 ng/mL Final   • WBC 04/18/2022 13.43 (A) 3.40 - 10.80 10*3/mm3 Final   • RBC 04/18/2022 4.90  4.14 - 5.80 10*6/mm3 Final   • Hemoglobin 04/18/2022 14.3  13.0 - 17.7 g/dL Final   • Hematocrit 04/18/2022 41.9  37.5 - 51.0 % Final   • MCV 04/18/2022 85.5  79.0 - 97.0 fL Final   • MCH 04/18/2022 29.2  26.6 - 33.0 pg Final   • MCHC 04/18/2022 34.1  31.5 - 35.7 g/dL Final   • RDW 04/18/2022 12.4  12.3 - 15.4 % Final   • RDW-SD 04/18/2022 38.9  37.0 - 54.0 fl Final   • MPV 04/18/2022 10.5  6.0 - 12.0 fL Final   • Platelets 04/18/2022 90 (A) 140 - 450 10*3/mm3 Final   • Neutrophil % 04/18/2022 73.4  42.7 - 76.0 % Final   • Lymphocyte % 04/18/2022 15.9 (A) 19.6 - 45.3 % Final   • Monocyte % 04/18/2022 9.2  5.0 - 12.0 % Final   • Eosinophil % 04/18/2022 0.6  0.3 - 6.2 % Final   • Basophil % 04/18/2022 0.6  0.0 - 1.5 % Final   • Immature Grans %  04/18/2022 0.3  0.0 - 0.5 % Final   • Neutrophils, Absolute 04/18/2022 9.86 (A) 1.70 - 7.00 10*3/mm3 Final   • Lymphocytes, Absolute 04/18/2022 2.14  0.70 - 3.10 10*3/mm3 Final   • Monocytes, Absolute 04/18/2022 1.23 (A) 0.10 - 0.90 10*3/mm3 Final   • Eosinophils, Absolute 04/18/2022 0.08  0.00 - 0.40 10*3/mm3 Final   • Basophils, Absolute 04/18/2022 0.08  0.00 - 0.20 10*3/mm3 Final   • Immature Grans, Absolute 04/18/2022 0.04  0.00 - 0.05 10*3/mm3 Final   • nRBC 04/18/2022 0.0  0.0 - 0.2 /100 WBC Final   • Protime 04/18/2022 16.4 (A) 12.5 - 14.5 Seconds Final   • INR 04/18/2022 1.28 (A) 0.90 - 1.10 Final   • PTT 04/18/2022 103.5 (A) 70.0 - 100.0 seconds Final   • WBC 04/18/2022 11.74 (A) 3.40 - 10.80 10*3/mm3 Final   • RBC 04/18/2022 4.87  4.14 - 5.80 10*6/mm3 Final   • Hemoglobin 04/18/2022 14.2  13.0 - 17.7 g/dL Final   • Hematocrit 04/18/2022 42.0  37.5 - 51.0 % Final   • MCV 04/18/2022 86.2  79.0 - 97.0 fL Final   • MCH 04/18/2022 29.2  26.6 - 33.0 pg Final   • MCHC 04/18/2022 33.8  31.5 - 35.7 g/dL Final   • RDW 04/18/2022 12.5  12.3 - 15.4 % Final   • RDW-SD 04/18/2022 39.2  37.0 - 54.0 fl Final   • MPV 04/18/2022 9.9  6.0 - 12.0 fL Final   • Platelets 04/18/2022 84 (A) 140 - 450 10*3/mm3 Final   • Neutrophil % 04/18/2022 74.5  42.7 - 76.0 % Final   • Lymphocyte % 04/18/2022 14.8 (A) 19.6 - 45.3 % Final   • Monocyte % 04/18/2022 8.9  5.0 - 12.0 % Final   • Eosinophil % 04/18/2022 0.9  0.3 - 6.2 % Final   • Basophil % 04/18/2022 0.6  0.0 - 1.5 % Final   • Immature Grans % 04/18/2022 0.3  0.0 - 0.5 % Final   • Neutrophils, Absolute 04/18/2022 8.74 (A) 1.70 - 7.00 10*3/mm3 Final   • Lymphocytes, Absolute 04/18/2022 1.74  0.70 - 3.10 10*3/mm3 Final   • Monocytes, Absolute 04/18/2022 1.05 (A) 0.10 - 0.90 10*3/mm3 Final   • Eosinophils, Absolute 04/18/2022 0.11  0.00 - 0.40 10*3/mm3 Final   • Basophils, Absolute 04/18/2022 0.07  0.00 - 0.20 10*3/mm3 Final   • Immature Grans, Absolute 04/18/2022 0.03  0.00 - 0.05  10*3/mm3 Final   • nRBC 04/18/2022 0.0  0.0 - 0.2 /100 WBC Final   • PTT 04/19/2022 >200.0 (A) 70.0 - 100.0 seconds Final       CT Abdomen Pelvis With Contrast    Result Date: 4/18/2022  Narrative: FINAL REPORT TECHNIQUE: Axial CT images were performed from the lung bases through the symphysis pubis after the administration of intravenous contrast.  This study was performed with techniques to keep radiation doses as low as reasonably achievable (ALARA). Individualized dose reduction techniques using automated exposure control or adjustment of mA and/or kV according to the patient's size were employed. CLINICAL HISTORY: Flank pain, kidney stone suspected FINDINGS: ABDOMEN/PELVIS:  Liver, gallbladder and bile ducts: The liver enhances homogeneously without suspicious focal hepatic lesion. The gallbladder is unremarkable.  There is no definite biliary duct dilatation.  Adrenal glands: The adrenal glands are morphologically unremarkable without suspicious lesion. Kidneys, ureter and urinary bladder: There are peripheral wedge-shaped regions of diminished renal cortical enhancement involving the superior pole of the left kidney and the interpolar region of the right kidney.  No hydronephrosis. Urinary bladder is unremarkable.  Spleen: The spleen is normal size.  Pancreas: The pancreas is grossly unremarkable.  GI systems and mesentery: No evidence of bowel obstruction.  The appendix is not well visualized but there are no secondary signs of appendicitis.    No significant mesenteric inflammation. Lymph nodes: No definite pathologically enlarged abdominal or pelvic lymph nodes present.  Vessels: The aorta and abdominal arteries are grossly patent.  The IVC and portal vein are patent and grossly unremarkable.  Peritoneum: No free intraperitoneal fluid or pneumoperitoneum.  Pelvic viscera: No acute findings. Body wall: No body wall contusion. No significant body wall hernias.  Bones: No acute fracture.     Impression:  Findings are concerning for bilateral renal infarcts, the left appears acute but the right may be chronic. Authenticated by Jose Alberto Gupta MD on 04/18/2022 07:05:15 PM    XR Chest 1 View    Result Date: 4/18/2022  Narrative: PROCEDURE: XR CHEST 1 VW-  HISTORY: Chest pain  COMPARISON: None.  FINDINGS: The heart is normal in size. The mediastinum is unremarkable. The lungs are clear. There is no pneumothorax.  There are no acute osseous abnormalities.      Impression: No acute cardiopulmonary process.  Continued followup is recommended.  This report was signed and finalized on 4/18/2022 3:40 PM by Shelton Gonzalez M.D..    CT Angiogram Chest Pulmonary Embolism    Result Date: 4/18/2022  Narrative: FINAL REPORT TECHNIQUE: Multiple axial CT images were obtained through the chest following IV contrast using a CTA/PE protocol.  3D/MIP reconstruction images were also performed. This study was performed with techniques to keep radiation doses as low as reasonably achievable (ALARA). Individualized dose reduction techniques using automated exposure control or adjustment of mA and/or kV according to the patient's size were employed. CLINICAL HISTORY: PE suspected, high prob FINDINGS: PAs and aorta: No pulmonary embolus.  Thoracic aorta is unremarkable.  No significant coronary artery calcifications. Heart/mediastinum: No evidence for right heart strain.  No pericardial effusion.  The heart is normal in size. Lungs: Mild atelectasis.  Otherwise the lungs are clear. Lymph nodes: There are nonspecific prominent bilateral axillary lymph nodes. Pleura: No pneumothorax or pleural effusion. Chest Wall: No chest wall contusion. Bones: No acute fracture.     Impression: No pulmonary embolus. Nonspecific prominent bilateral axillary lymph nodes. Authenticated by Jose Alberto Gupta MD on 04/18/2022 07:05:37 PM      Assessment / Plan      Assessment/Plan:   1. Acute deep vein thrombosis (DVT) of distal vein of both lower extremities  (HCC)  -Patient with 2 unprovoked DVTs.  First occurring in the right lower extremity for which he was treated with Eliquis x2.5 months before developing a left lower extremity DVT  -Hypercoagulable work-up consistent with antiphospholipid syndrome  -We will need to continue Xarelto indefinitely  -Continue Xarelto 15 mg twice daily     2. Antiphospholipid syndrome (HCC)  -Patient with a positive lupus anticoagulant, anticardiolipins, and beta-2 glycoprotein's  -We will need to continue indefinite anticoagulation as above  -Discussed continuing Xarelto 15 mg twice daily versus transitioning to warfarin which is typically the standard of care for antiphospholipid syndrome  - Patient would like to continue with Xarelto at this time as he is uncertain of whether he can do frequent lab draws and be compliant with diet    3. Bilateral renal arterial stenosis  -Recently diagnosed  -We will obtain records from [place]  -Continue anticoagulation as above    4. 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  No indication for platelet transfusion or bone marrow biopsy     Follow Up:   Follow-up in 6 months or sooner if needed     Barry Clark MD  Hematology and Oncology     Please note that portions of this note may have been completed with a voice recognition program. Efforts were made to edit the dictations, but occasionally words are mistranscribed.

## 2022-05-24 ENCOUNTER — HOSPITAL ENCOUNTER (INPATIENT)
Facility: HOSPITAL | Age: 27
LOS: 9 days | Discharge: NURSING, REHAB OR LONG-TERM CARE | End: 2022-06-02
Attending: STUDENT IN AN ORGANIZED HEALTH CARE EDUCATION/TRAINING PROGRAM | Admitting: STUDENT IN AN ORGANIZED HEALTH CARE EDUCATION/TRAINING PROGRAM

## 2022-05-24 ENCOUNTER — APPOINTMENT (OUTPATIENT)
Dept: CT IMAGING | Facility: HOSPITAL | Age: 27
End: 2022-05-24

## 2022-05-24 ENCOUNTER — HOSPITAL ENCOUNTER (EMERGENCY)
Facility: HOSPITAL | Age: 27
Discharge: ANOTHER HOSPITAL | End: 2022-05-24
Attending: EMERGENCY MEDICINE | Admitting: EMERGENCY MEDICINE

## 2022-05-24 ENCOUNTER — APPOINTMENT (OUTPATIENT)
Dept: CARDIOLOGY | Facility: HOSPITAL | Age: 27
End: 2022-05-24

## 2022-05-24 ENCOUNTER — APPOINTMENT (OUTPATIENT)
Dept: MRI IMAGING | Facility: HOSPITAL | Age: 27
End: 2022-05-24

## 2022-05-24 VITALS
HEART RATE: 85 BPM | BODY MASS INDEX: 24.92 KG/M2 | HEIGHT: 72 IN | SYSTOLIC BLOOD PRESSURE: 122 MMHG | TEMPERATURE: 97.9 F | WEIGHT: 184 LBS | RESPIRATION RATE: 16 BRPM | DIASTOLIC BLOOD PRESSURE: 89 MMHG | OXYGEN SATURATION: 98 %

## 2022-05-24 DIAGNOSIS — I82.4Z3 ACUTE DEEP VEIN THROMBOSIS (DVT) OF DISTAL VEIN OF BOTH LOWER EXTREMITIES: ICD-10-CM

## 2022-05-24 DIAGNOSIS — I63.511 ACUTE ISCHEMIC RIGHT MCA STROKE: Primary | ICD-10-CM

## 2022-05-24 DIAGNOSIS — R47.1 DYSARTHRIA: ICD-10-CM

## 2022-05-24 DIAGNOSIS — I33.0 AORTIC VALVE VEGETATION: ICD-10-CM

## 2022-05-24 DIAGNOSIS — D68.61 ANTIPHOSPHOLIPID SYNDROME: ICD-10-CM

## 2022-05-24 DIAGNOSIS — R41.841 COGNITIVE COMMUNICATION DEFICIT: ICD-10-CM

## 2022-05-24 DIAGNOSIS — R13.11 ORAL PHASE DYSPHAGIA: ICD-10-CM

## 2022-05-24 LAB
ALBUMIN SERPL-MCNC: 4.3 G/DL (ref 3.5–5.2)
ALBUMIN/GLOB SERPL: 1.3 G/DL
ALP SERPL-CCNC: 59 U/L (ref 39–117)
ALT SERPL W P-5'-P-CCNC: 18 U/L (ref 1–41)
ANION GAP SERPL CALCULATED.3IONS-SCNC: 12 MMOL/L (ref 5–15)
APTT PPP: 75.1 SECONDS (ref 60–90)
APTT PPP: 99.9 SECONDS (ref 70–100)
ASCENDING AORTA: 3.1 CM
AST SERPL-CCNC: 16 U/L (ref 1–40)
BASOPHILS # BLD AUTO: 0.08 10*3/MM3 (ref 0–0.2)
BASOPHILS NFR BLD AUTO: 0.8 % (ref 0–1.5)
BH CV ECHO MEAS - AO MAX PG: 3.3 MMHG
BH CV ECHO MEAS - AO MEAN PG: 1.55 MMHG
BH CV ECHO MEAS - AO ROOT DIAM: 4.1 CM
BH CV ECHO MEAS - AO V2 MAX: 91.4 CM/SEC
BH CV ECHO MEAS - AO V2 VTI: 17.7 CM
BH CV ECHO MEAS - AVA(I,D): 3.6 CM2
BH CV ECHO MEAS - EDV(CUBED): 118.3 ML
BH CV ECHO MEAS - EDV(MOD-SP2): 85 ML
BH CV ECHO MEAS - EDV(MOD-SP4): 74 ML
BH CV ECHO MEAS - EF(MOD-BP): 67 %
BH CV ECHO MEAS - EF(MOD-SP2): 67.1 %
BH CV ECHO MEAS - EF(MOD-SP4): 64.9 %
BH CV ECHO MEAS - ESV(CUBED): 29.2 ML
BH CV ECHO MEAS - ESV(MOD-SP2): 28 ML
BH CV ECHO MEAS - ESV(MOD-SP4): 26 ML
BH CV ECHO MEAS - FS: 37.3 %
BH CV ECHO MEAS - IVS/LVPW: 0.94 CM
BH CV ECHO MEAS - IVSD: 0.94 CM
BH CV ECHO MEAS - LA DIMENSION: 3.1 CM
BH CV ECHO MEAS - LAT PEAK E' VEL: 10.3 CM/SEC
BH CV ECHO MEAS - LV MASS(C)D: 168.2 GRAMS
BH CV ECHO MEAS - LV MAX PG: 2.8 MMHG
BH CV ECHO MEAS - LV MEAN PG: 1.27 MMHG
BH CV ECHO MEAS - LV V1 MAX: 83.3 CM/SEC
BH CV ECHO MEAS - LV V1 VTI: 16.3 CM
BH CV ECHO MEAS - LVIDD: 4.9 CM
BH CV ECHO MEAS - LVIDS: 3.1 CM
BH CV ECHO MEAS - LVOT AREA: 3.9 CM2
BH CV ECHO MEAS - LVOT DIAM: 2.23 CM
BH CV ECHO MEAS - LVPWD: 0.99 CM
BH CV ECHO MEAS - MED PEAK E' VEL: 9.43 CM/SEC
BH CV ECHO MEAS - MV A MAX VEL: 38.5 CM/SEC
BH CV ECHO MEAS - MV DEC SLOPE: 392.3 CM/SEC2
BH CV ECHO MEAS - MV DEC TIME: 0.18 MSEC
BH CV ECHO MEAS - MV E MAX VEL: 81.9 CM/SEC
BH CV ECHO MEAS - MV E/A: 2.13
BH CV ECHO MEAS - MV MAX PG: 3 MMHG
BH CV ECHO MEAS - MV MEAN PG: 1.54 MMHG
BH CV ECHO MEAS - MV P1/2T: 64.2 MSEC
BH CV ECHO MEAS - MV V2 VTI: 25.9 CM
BH CV ECHO MEAS - MVA(P1/2T): 3.4 CM2
BH CV ECHO MEAS - MVA(VTI): 2.46 CM2
BH CV ECHO MEAS - PA ACC SLOPE: 425.4 CM/SEC2
BH CV ECHO MEAS - PA ACC TIME: 0.2 SEC
BH CV ECHO MEAS - PA PR(ACCEL): -12.9 MMHG
BH CV ECHO MEAS - PA V2 MAX: 104.7 CM/SEC
BH CV ECHO MEAS - PI END-D VEL: 99.9 CM/SEC
BH CV ECHO MEAS - RVSP: 24 MMHG
BH CV ECHO MEAS - SV(LVOT): 63.8 ML
BH CV ECHO MEAS - SV(MOD-SP2): 57 ML
BH CV ECHO MEAS - SV(MOD-SP4): 48 ML
BH CV ECHO MEAS - TAPSE (>1.6): 2.4 CM
BH CV ECHO MEAS - TR MAX PG: 14.4 MMHG
BH CV ECHO MEAS - TR MAX VEL: 190 CM/SEC
BH CV ECHO MEASUREMENTS AVERAGE E/E' RATIO: 8.3
BH CV LOW VAS RIGHT COMMON FEMORAL SPONT: 1
BH CV LOW VAS RIGHT MID FEMORAL SPONT: 1
BH CV LOW VAS RIGHT PROFUNDA FEMORAL SPONT: 1
BH CV LOW VAS RIGHT PROXIMAL FEMORAL SPONT: 1
BH CV LOW VAS RIGHT SAPHENOFEMORAL JUNCTION SPONT: 1
BH CV LOWER VASCULAR LEFT COMMON FEMORAL AUGMENT: NORMAL
BH CV LOWER VASCULAR LEFT COMMON FEMORAL COMPRESS: NORMAL
BH CV LOWER VASCULAR LEFT COMMON FEMORAL PHASIC: NORMAL
BH CV LOWER VASCULAR LEFT COMMON FEMORAL SPONT: NORMAL
BH CV LOWER VASCULAR LEFT DISTAL FEMORAL AUGMENT: NORMAL
BH CV LOWER VASCULAR LEFT DISTAL FEMORAL COMPRESS: NORMAL
BH CV LOWER VASCULAR LEFT DISTAL FEMORAL PHASIC: NORMAL
BH CV LOWER VASCULAR LEFT DISTAL FEMORAL SPONT: NORMAL
BH CV LOWER VASCULAR LEFT GASTRONEMIUS COMPRESS: NORMAL
BH CV LOWER VASCULAR LEFT GREATER SAPH AK COMPRESS: NORMAL
BH CV LOWER VASCULAR LEFT GREATER SAPH BK COMPRESS: NORMAL
BH CV LOWER VASCULAR LEFT LESSER SAPH COMPRESS: NORMAL
BH CV LOWER VASCULAR LEFT MID FEMORAL AUGMENT: NORMAL
BH CV LOWER VASCULAR LEFT MID FEMORAL COMPRESS: NORMAL
BH CV LOWER VASCULAR LEFT MID FEMORAL PHASIC: NORMAL
BH CV LOWER VASCULAR LEFT MID FEMORAL SPONT: NORMAL
BH CV LOWER VASCULAR LEFT PERONEAL COMPRESS: NORMAL
BH CV LOWER VASCULAR LEFT POPLITEAL AUGMENT: NORMAL
BH CV LOWER VASCULAR LEFT POPLITEAL COMPRESS: NORMAL
BH CV LOWER VASCULAR LEFT POPLITEAL PHASIC: NORMAL
BH CV LOWER VASCULAR LEFT POPLITEAL SPONT: NORMAL
BH CV LOWER VASCULAR LEFT POSTERIOR TIBIAL COMPRESS: NORMAL
BH CV LOWER VASCULAR LEFT PROFUNDA FEMORAL AUGMENT: NORMAL
BH CV LOWER VASCULAR LEFT PROFUNDA FEMORAL COMPRESS: NORMAL
BH CV LOWER VASCULAR LEFT PROFUNDA FEMORAL PHASIC: NORMAL
BH CV LOWER VASCULAR LEFT PROFUNDA FEMORAL SPONT: NORMAL
BH CV LOWER VASCULAR LEFT PROXIMAL FEMORAL AUGMENT: NORMAL
BH CV LOWER VASCULAR LEFT PROXIMAL FEMORAL COMPRESS: NORMAL
BH CV LOWER VASCULAR LEFT PROXIMAL FEMORAL PHASIC: NORMAL
BH CV LOWER VASCULAR LEFT PROXIMAL FEMORAL SPONT: NORMAL
BH CV LOWER VASCULAR LEFT SAPHENOFEMORAL JUNCTION AUGMENT: NORMAL
BH CV LOWER VASCULAR LEFT SAPHENOFEMORAL JUNCTION COMPRESS: NORMAL
BH CV LOWER VASCULAR LEFT SAPHENOFEMORAL JUNCTION PHASIC: NORMAL
BH CV LOWER VASCULAR LEFT SAPHENOFEMORAL JUNCTION SPONT: NORMAL
BH CV LOWER VASCULAR RIGHT COMMON FEMORAL AUGMENT: NORMAL
BH CV LOWER VASCULAR RIGHT COMMON FEMORAL COMPRESS: NORMAL
BH CV LOWER VASCULAR RIGHT COMMON FEMORAL PHASIC: NORMAL
BH CV LOWER VASCULAR RIGHT COMMON FEMORAL SPONT: NORMAL
BH CV LOWER VASCULAR RIGHT DISTAL FEMORAL AUGMENT: NORMAL
BH CV LOWER VASCULAR RIGHT DISTAL FEMORAL COMPRESS: NORMAL
BH CV LOWER VASCULAR RIGHT DISTAL FEMORAL PHASIC: NORMAL
BH CV LOWER VASCULAR RIGHT DISTAL FEMORAL SPONT: NORMAL
BH CV LOWER VASCULAR RIGHT EXTERNAL ILIAC AUGMENT: NORMAL
BH CV LOWER VASCULAR RIGHT EXTERNAL ILIAC PHASIC: NORMAL
BH CV LOWER VASCULAR RIGHT EXTERNAL ILIAC SPONT: NORMAL
BH CV LOWER VASCULAR RIGHT GASTRONEMIUS COMPRESS: NORMAL
BH CV LOWER VASCULAR RIGHT GREATER SAPH AK COMPRESS: NORMAL
BH CV LOWER VASCULAR RIGHT GREATER SAPH BK COMPRESS: NORMAL
BH CV LOWER VASCULAR RIGHT LESSER SAPH COMPRESS: NORMAL
BH CV LOWER VASCULAR RIGHT MID FEMORAL AUGMENT: NORMAL
BH CV LOWER VASCULAR RIGHT MID FEMORAL COMPRESS: NORMAL
BH CV LOWER VASCULAR RIGHT MID FEMORAL PHASIC: NORMAL
BH CV LOWER VASCULAR RIGHT MID FEMORAL SPONT: NORMAL
BH CV LOWER VASCULAR RIGHT PERONEAL COMPRESS: NORMAL
BH CV LOWER VASCULAR RIGHT POPLITEAL AUGMENT: NORMAL
BH CV LOWER VASCULAR RIGHT POPLITEAL COMPRESS: NORMAL
BH CV LOWER VASCULAR RIGHT POPLITEAL PHASIC: NORMAL
BH CV LOWER VASCULAR RIGHT POPLITEAL SPONT: NORMAL
BH CV LOWER VASCULAR RIGHT POSTERIOR TIBIAL COMPRESS: NORMAL
BH CV LOWER VASCULAR RIGHT PROFUNDA FEMORAL AUGMENT: NORMAL
BH CV LOWER VASCULAR RIGHT PROFUNDA FEMORAL COMPRESS: NORMAL
BH CV LOWER VASCULAR RIGHT PROFUNDA FEMORAL PHASIC: NORMAL
BH CV LOWER VASCULAR RIGHT PROFUNDA FEMORAL SPONT: NORMAL
BH CV LOWER VASCULAR RIGHT PROXIMAL FEMORAL AUGMENT: NORMAL
BH CV LOWER VASCULAR RIGHT PROXIMAL FEMORAL COMPRESS: NORMAL
BH CV LOWER VASCULAR RIGHT PROXIMAL FEMORAL PHASIC: NORMAL
BH CV LOWER VASCULAR RIGHT PROXIMAL FEMORAL SPONT: NORMAL
BH CV LOWER VASCULAR RIGHT SAPHENOFEMORAL JUNCTION AUGMENT: NORMAL
BH CV LOWER VASCULAR RIGHT SAPHENOFEMORAL JUNCTION COMPRESS: NORMAL
BH CV LOWER VASCULAR RIGHT SAPHENOFEMORAL JUNCTION PHASIC: NORMAL
BH CV LOWER VASCULAR RIGHT SAPHENOFEMORAL JUNCTION SPONT: NORMAL
BH CV VAS BP LEFT ARM: NORMAL MMHG
BH CV XLRA - RV BASE: 3.3 CM
BH CV XLRA - RV LENGTH: 6 CM
BH CV XLRA - RV MID: 3.2 CM
BH CV XLRA - TDI S': 17.5 CM/SEC
BILIRUB SERPL-MCNC: 0.2 MG/DL (ref 0–1.2)
BUN SERPL-MCNC: 17 MG/DL (ref 6–20)
BUN/CREAT SERPL: 19.1 (ref 7–25)
CALCIUM SPEC-SCNC: 9 MG/DL (ref 8.6–10.5)
CHLORIDE SERPL-SCNC: 101 MMOL/L (ref 98–107)
CO2 SERPL-SCNC: 24 MMOL/L (ref 22–29)
CREAT SERPL-MCNC: 0.89 MG/DL (ref 0.76–1.27)
DEPRECATED RDW RBC AUTO: 40.1 FL (ref 37–54)
EGFRCR SERPLBLD CKD-EPI 2021: 122 ML/MIN/1.73
EOSINOPHIL # BLD AUTO: 0.19 10*3/MM3 (ref 0–0.4)
EOSINOPHIL NFR BLD AUTO: 2 % (ref 0.3–6.2)
ERYTHROCYTE [DISTWIDTH] IN BLOOD BY AUTOMATED COUNT: 12.5 % (ref 12.3–15.4)
FLUAV RNA RESP QL NAA+PROBE: NOT DETECTED
FLUBV RNA RESP QL NAA+PROBE: NOT DETECTED
GLOBULIN UR ELPH-MCNC: 3.2 GM/DL
GLUCOSE BLDC GLUCOMTR-MCNC: 104 MG/DL (ref 70–130)
GLUCOSE BLDC GLUCOMTR-MCNC: 86 MG/DL (ref 70–130)
GLUCOSE SERPL-MCNC: 141 MG/DL (ref 65–99)
HCT VFR BLD AUTO: 39.4 % (ref 37.5–51)
HGB BLD-MCNC: 13.1 G/DL (ref 13–17.7)
IMM GRANULOCYTES # BLD AUTO: 0.02 10*3/MM3 (ref 0–0.05)
IMM GRANULOCYTES NFR BLD AUTO: 0.2 % (ref 0–0.5)
INR PPP: 1.18 (ref 0.9–1.1)
IVRT: 77 MSEC
LEFT ATRIUM VOLUME INDEX: 16.5 ML/M2
LV EF 2D ECHO EST: 70 %
LYMPHOCYTES # BLD AUTO: 1.6 10*3/MM3 (ref 0.7–3.1)
LYMPHOCYTES NFR BLD AUTO: 16.9 % (ref 19.6–45.3)
MAXIMAL PREDICTED HEART RATE: 195 BPM
MAXIMAL PREDICTED HEART RATE: 195 BPM
MCH RBC QN AUTO: 29 PG (ref 26.6–33)
MCHC RBC AUTO-ENTMCNC: 33.2 G/DL (ref 31.5–35.7)
MCV RBC AUTO: 87.4 FL (ref 79–97)
MONOCYTES # BLD AUTO: 0.62 10*3/MM3 (ref 0.1–0.9)
MONOCYTES NFR BLD AUTO: 6.5 % (ref 5–12)
NEUTROPHILS NFR BLD AUTO: 6.97 10*3/MM3 (ref 1.7–7)
NEUTROPHILS NFR BLD AUTO: 73.6 % (ref 42.7–76)
NRBC BLD AUTO-RTO: 0 /100 WBC (ref 0–0.2)
PLATELET # BLD AUTO: 67 10*3/MM3 (ref 140–450)
PMV BLD AUTO: 10.2 FL (ref 6–12)
POTASSIUM SERPL-SCNC: 3.5 MMOL/L (ref 3.5–5.2)
POTASSIUM SERPL-SCNC: 4 MMOL/L (ref 3.5–5.2)
PROT SERPL-MCNC: 7.5 G/DL (ref 6–8.5)
PROTHROMBIN TIME: 15.3 SECONDS (ref 12.5–14.5)
RBC # BLD AUTO: 4.51 10*6/MM3 (ref 4.14–5.8)
RBC MORPH BLD: NORMAL
SARS-COV-2 RNA RESP QL NAA+PROBE: NOT DETECTED
SMALL PLATELETS BLD QL SMEAR: NORMAL
SODIUM SERPL-SCNC: 137 MMOL/L (ref 136–145)
STRESS TARGET HR: 166 BPM
STRESS TARGET HR: 166 BPM
TROPONIN T SERPL-MCNC: <0.01 NG/ML (ref 0–0.03)
UFH PPP CHRO-ACNC: 0.1 IU/ML (ref 0.3–0.7)
WBC MORPH BLD: NORMAL
WBC NRBC COR # BLD: 9.48 10*3/MM3 (ref 3.4–10.8)

## 2022-05-24 PROCEDURE — C1760 CLOSURE DEV, VASC: HCPCS | Performed by: STUDENT IN AN ORGANIZED HEALTH CARE EDUCATION/TRAINING PROGRAM

## 2022-05-24 PROCEDURE — 25010000002 LORAZEPAM PER 2 MG: Performed by: PSYCHIATRY & NEUROLOGY

## 2022-05-24 PROCEDURE — 25010000002 HEPARIN (PORCINE) PER 1000 UNITS: Performed by: NURSE PRACTITIONER

## 2022-05-24 PROCEDURE — 99223 1ST HOSP IP/OBS HIGH 75: CPT | Performed by: INTERNAL MEDICINE

## 2022-05-24 PROCEDURE — 25010000002 IOPAMIDOL 61 % SOLUTION: Performed by: EMERGENCY MEDICINE

## 2022-05-24 PROCEDURE — 93970 EXTREMITY STUDY: CPT | Performed by: INTERNAL MEDICINE

## 2022-05-24 PROCEDURE — 84132 ASSAY OF SERUM POTASSIUM: CPT | Performed by: PSYCHIATRY & NEUROLOGY

## 2022-05-24 PROCEDURE — C1769 GUIDE WIRE: HCPCS | Performed by: STUDENT IN AN ORGANIZED HEALTH CARE EDUCATION/TRAINING PROGRAM

## 2022-05-24 PROCEDURE — 99153 MOD SED SAME PHYS/QHP EA: CPT | Performed by: STUDENT IN AN ORGANIZED HEALTH CARE EDUCATION/TRAINING PROGRAM

## 2022-05-24 PROCEDURE — 85025 COMPLETE CBC W/AUTO DIFF WBC: CPT | Performed by: EMERGENCY MEDICINE

## 2022-05-24 PROCEDURE — 25010000002 FENTANYL CITRATE (PF) 50 MCG/ML SOLUTION: Performed by: STUDENT IN AN ORGANIZED HEALTH CARE EDUCATION/TRAINING PROGRAM

## 2022-05-24 PROCEDURE — 70450 CT HEAD/BRAIN W/O DYE: CPT

## 2022-05-24 PROCEDURE — C1887 CATHETER, GUIDING: HCPCS | Performed by: STUDENT IN AN ORGANIZED HEALTH CARE EDUCATION/TRAINING PROGRAM

## 2022-05-24 PROCEDURE — 87040 BLOOD CULTURE FOR BACTERIA: CPT | Performed by: INTERNAL MEDICINE

## 2022-05-24 PROCEDURE — 99284 EMERGENCY DEPT VISIT MOD MDM: CPT

## 2022-05-24 PROCEDURE — 85520 HEPARIN ASSAY: CPT

## 2022-05-24 PROCEDURE — 99223 1ST HOSP IP/OBS HIGH 75: CPT | Performed by: PSYCHIATRY & NEUROLOGY

## 2022-05-24 PROCEDURE — 84484 ASSAY OF TROPONIN QUANT: CPT | Performed by: EMERGENCY MEDICINE

## 2022-05-24 PROCEDURE — 93005 ELECTROCARDIOGRAM TRACING: CPT | Performed by: EMERGENCY MEDICINE

## 2022-05-24 PROCEDURE — 0 POTASSIUM CHLORIDE 10 MEQ/100ML SOLUTION

## 2022-05-24 PROCEDURE — 93306 TTE W/DOPPLER COMPLETE: CPT | Performed by: INTERNAL MEDICINE

## 2022-05-24 PROCEDURE — 99152 MOD SED SAME PHYS/QHP 5/>YRS: CPT | Performed by: STUDENT IN AN ORGANIZED HEALTH CARE EDUCATION/TRAINING PROGRAM

## 2022-05-24 PROCEDURE — 25010000002 ONDANSETRON PER 1 MG: Performed by: NURSE PRACTITIONER

## 2022-05-24 PROCEDURE — 80053 COMPREHEN METABOLIC PANEL: CPT | Performed by: EMERGENCY MEDICINE

## 2022-05-24 PROCEDURE — C1894 INTRO/SHEATH, NON-LASER: HCPCS | Performed by: STUDENT IN AN ORGANIZED HEALTH CARE EDUCATION/TRAINING PROGRAM

## 2022-05-24 PROCEDURE — 85610 PROTHROMBIN TIME: CPT | Performed by: EMERGENCY MEDICINE

## 2022-05-24 PROCEDURE — 03CG3Z7 EXTIRPATION OF MATTER FROM INTRACRANIAL ARTERY USING STENT RETRIEVER, PERCUTANEOUS APPROACH: ICD-10-PCS | Performed by: STUDENT IN AN ORGANIZED HEALTH CARE EDUCATION/TRAINING PROGRAM

## 2022-05-24 PROCEDURE — 99291 CRITICAL CARE FIRST HOUR: CPT | Performed by: INTERNAL MEDICINE

## 2022-05-24 PROCEDURE — 85520 HEPARIN ASSAY: CPT | Performed by: NURSE PRACTITIONER

## 2022-05-24 PROCEDURE — 70496 CT ANGIOGRAPHY HEAD: CPT

## 2022-05-24 PROCEDURE — C1766 INTRO/SHEATH,STRBLE,NON-PEEL: HCPCS | Performed by: STUDENT IN AN ORGANIZED HEALTH CARE EDUCATION/TRAINING PROGRAM

## 2022-05-24 PROCEDURE — 70498 CT ANGIOGRAPHY NECK: CPT

## 2022-05-24 PROCEDURE — 85730 THROMBOPLASTIN TIME PARTIAL: CPT

## 2022-05-24 PROCEDURE — 87636 SARSCOV2 & INF A&B AMP PRB: CPT | Performed by: INTERNAL MEDICINE

## 2022-05-24 PROCEDURE — 0 IODIXANOL PER 1 ML: Performed by: STUDENT IN AN ORGANIZED HEALTH CARE EDUCATION/TRAINING PROGRAM

## 2022-05-24 PROCEDURE — 85730 THROMBOPLASTIN TIME PARTIAL: CPT | Performed by: EMERGENCY MEDICINE

## 2022-05-24 PROCEDURE — 61645 PERQ ART M-THROMBECT &/NFS: CPT | Performed by: STUDENT IN AN ORGANIZED HEALTH CARE EDUCATION/TRAINING PROGRAM

## 2022-05-24 PROCEDURE — 93306 TTE W/DOPPLER COMPLETE: CPT

## 2022-05-24 PROCEDURE — 85007 BL SMEAR W/DIFF WBC COUNT: CPT | Performed by: EMERGENCY MEDICINE

## 2022-05-24 PROCEDURE — 82962 GLUCOSE BLOOD TEST: CPT

## 2022-05-24 PROCEDURE — 93970 EXTREMITY STUDY: CPT

## 2022-05-24 PROCEDURE — 70551 MRI BRAIN STEM W/O DYE: CPT

## 2022-05-24 PROCEDURE — 92610 EVALUATE SWALLOWING FUNCTION: CPT | Performed by: SPEECH-LANGUAGE PATHOLOGIST

## 2022-05-24 PROCEDURE — B3161ZZ FLUOROSCOPY OF RIGHT INTERNAL CAROTID ARTERY USING LOW OSMOLAR CONTRAST: ICD-10-PCS | Performed by: STUDENT IN AN ORGANIZED HEALTH CARE EDUCATION/TRAINING PROGRAM

## 2022-05-24 PROCEDURE — 25010000002 ONDANSETRON PER 1 MG: Performed by: STUDENT IN AN ORGANIZED HEALTH CARE EDUCATION/TRAINING PROGRAM

## 2022-05-24 PROCEDURE — 0042T HC CT CEREBRAL PERFUSION W/WO CONTRAST: CPT

## 2022-05-24 RX ORDER — ASPIRIN 300 MG/1
300 SUPPOSITORY RECTAL DAILY
Status: DISCONTINUED | OUTPATIENT
Start: 2022-05-24 | End: 2022-06-02 | Stop reason: HOSPADM

## 2022-05-24 RX ORDER — POTASSIUM CHLORIDE 1.5 G/1.77G
40 POWDER, FOR SOLUTION ORAL AS NEEDED
Status: DISCONTINUED | OUTPATIENT
Start: 2022-05-24 | End: 2022-06-02 | Stop reason: HOSPADM

## 2022-05-24 RX ORDER — ASPIRIN 81 MG/1
81 TABLET, CHEWABLE ORAL DAILY
Status: DISCONTINUED | OUTPATIENT
Start: 2022-05-24 | End: 2022-06-02 | Stop reason: HOSPADM

## 2022-05-24 RX ORDER — SODIUM CHLORIDE 0.9 % (FLUSH) 0.9 %
10 SYRINGE (ML) INJECTION EVERY 12 HOURS SCHEDULED
Status: DISCONTINUED | OUTPATIENT
Start: 2022-05-24 | End: 2022-06-02 | Stop reason: HOSPADM

## 2022-05-24 RX ORDER — ONDANSETRON 2 MG/ML
4 INJECTION INTRAMUSCULAR; INTRAVENOUS EVERY 6 HOURS PRN
Status: DISCONTINUED | OUTPATIENT
Start: 2022-05-24 | End: 2022-06-02 | Stop reason: HOSPADM

## 2022-05-24 RX ORDER — LORAZEPAM 2 MG/ML
0.5 INJECTION INTRAMUSCULAR ONCE
Status: COMPLETED | OUTPATIENT
Start: 2022-05-24 | End: 2022-05-24

## 2022-05-24 RX ORDER — LIDOCAINE HYDROCHLORIDE 10 MG/ML
INJECTION, SOLUTION EPIDURAL; INFILTRATION; INTRACAUDAL; PERINEURAL AS NEEDED
Status: DISCONTINUED | OUTPATIENT
Start: 2022-05-24 | End: 2022-05-24 | Stop reason: HOSPADM

## 2022-05-24 RX ORDER — FENTANYL CITRATE 50 UG/ML
INJECTION, SOLUTION INTRAMUSCULAR; INTRAVENOUS AS NEEDED
Status: DISCONTINUED | OUTPATIENT
Start: 2022-05-24 | End: 2022-05-24 | Stop reason: HOSPADM

## 2022-05-24 RX ORDER — HEPARIN SOD,PORCINE/0.9 % NACL 25000/250
23 INTRAVENOUS SOLUTION INTRAVENOUS
Status: DISCONTINUED | OUTPATIENT
Start: 2022-05-24 | End: 2022-05-26

## 2022-05-24 RX ORDER — ONDANSETRON 2 MG/ML
INJECTION INTRAMUSCULAR; INTRAVENOUS AS NEEDED
Status: DISCONTINUED | OUTPATIENT
Start: 2022-05-24 | End: 2022-05-24 | Stop reason: HOSPADM

## 2022-05-24 RX ORDER — POTASSIUM CHLORIDE 750 MG/1
40 CAPSULE, EXTENDED RELEASE ORAL AS NEEDED
Status: DISCONTINUED | OUTPATIENT
Start: 2022-05-24 | End: 2022-06-02 | Stop reason: HOSPADM

## 2022-05-24 RX ORDER — IODIXANOL 320 MG/ML
INJECTION, SOLUTION INTRAVASCULAR AS NEEDED
Status: DISCONTINUED | OUTPATIENT
Start: 2022-05-24 | End: 2022-05-24 | Stop reason: HOSPADM

## 2022-05-24 RX ORDER — SODIUM CHLORIDE 0.9 % (FLUSH) 0.9 %
10 SYRINGE (ML) INJECTION AS NEEDED
Status: DISCONTINUED | OUTPATIENT
Start: 2022-05-24 | End: 2022-06-02 | Stop reason: HOSPADM

## 2022-05-24 RX ORDER — POTASSIUM CHLORIDE 7.45 MG/ML
10 INJECTION INTRAVENOUS
Status: DISCONTINUED | OUTPATIENT
Start: 2022-05-24 | End: 2022-06-02 | Stop reason: HOSPADM

## 2022-05-24 RX ORDER — ACETAMINOPHEN 325 MG/1
650 TABLET ORAL EVERY 6 HOURS PRN
Status: DISCONTINUED | OUTPATIENT
Start: 2022-05-24 | End: 2022-06-02 | Stop reason: HOSPADM

## 2022-05-24 RX ORDER — ATORVASTATIN CALCIUM 40 MG/1
80 TABLET, FILM COATED ORAL NIGHTLY
Status: DISCONTINUED | OUTPATIENT
Start: 2022-05-24 | End: 2022-06-02 | Stop reason: HOSPADM

## 2022-05-24 RX ADMIN — ACETAMINOPHEN 650 MG: 325 TABLET ORAL at 08:31

## 2022-05-24 RX ADMIN — LORAZEPAM 0.5 MG: 2 INJECTION INTRAMUSCULAR; INTRAVENOUS at 22:10

## 2022-05-24 RX ADMIN — ONDANSETRON 4 MG: 2 INJECTION INTRAMUSCULAR; INTRAVENOUS at 21:49

## 2022-05-24 RX ADMIN — HEPARIN SODIUM 12 UNITS/KG/HR: 5000 INJECTION, SOLUTION INTRAVENOUS; SUBCUTANEOUS at 16:13

## 2022-05-24 RX ADMIN — ASPIRIN 81 MG 81 MG: 81 TABLET ORAL at 08:31

## 2022-05-24 RX ADMIN — IOPAMIDOL 100 ML: 612 INJECTION, SOLUTION INTRAVENOUS at 03:46

## 2022-05-24 RX ADMIN — POTASSIUM CHLORIDE 10 MEQ: 7.46 INJECTION, SOLUTION INTRAVENOUS at 12:59

## 2022-05-24 RX ADMIN — ONDANSETRON 4 MG: 2 INJECTION INTRAMUSCULAR; INTRAVENOUS at 08:31

## 2022-05-24 RX ADMIN — ONDANSETRON 4 MG: 2 INJECTION INTRAMUSCULAR; INTRAVENOUS at 11:56

## 2022-05-24 RX ADMIN — Medication 10 ML: at 23:24

## 2022-05-24 RX ADMIN — POTASSIUM CHLORIDE 10 MEQ: 7.46 INJECTION, SOLUTION INTRAVENOUS at 13:58

## 2022-05-24 RX ADMIN — ATORVASTATIN CALCIUM 80 MG: 40 TABLET, FILM COATED ORAL at 23:56

## 2022-05-24 NOTE — ED NOTES
[facility] called for Neuro Interventionalist, Dr. Feliz transferred to Dr. Haddad.

## 2022-05-24 NOTE — ED PROVIDER NOTES
Subjective   27-year-old male presenting with left arm numbness and weakness.  He states that about 30 to 40 minutes ago he noticed that his left arm felt numb and weak, he could not lift or open a water bottle as normal.  Symptoms persisted so he called 911 he came in for evaluation.  He denies any facial droop, chest pain, shortness of breath, difficulty with speech or swallowing.  He does note that he was recently diagnosed with antiphospholipid syndrome and is currently on Xarelto.          Review of Systems   HENT: Negative.    Eyes: Negative.    Respiratory: Negative.    Cardiovascular: Negative.    Gastrointestinal: Negative.    Genitourinary: Negative.    Musculoskeletal: Negative.    Skin: Negative.    Neurological: Positive for weakness and numbness.   Psychiatric/Behavioral: Negative.        Past Medical History:   Diagnosis Date   • Antiphospholipid syndrome (HCC)    • Deep vein thrombosis (HCC)        No Known Allergies    Past Surgical History:   Procedure Laterality Date   • FRACTURE SURGERY     • LEG SURGERY Right     spiral fracture repair three years ago        Family History   Problem Relation Age of Onset   • No Known Problems Mother        Social History     Socioeconomic History   • Marital status: Single   Tobacco Use   • Smoking status: Current Every Day Smoker     Packs/day: 0.50     Years: 3.00     Pack years: 1.50     Types: Cigarettes     Last attempt to quit: 2021     Years since quittin.8   • Smokeless tobacco: Former User   Vaping Use   • Vaping Use: Former   • Substances: years ago   Substance and Sexual Activity   • Alcohol use: Not Currently     Comment: social   • Drug use: Yes     Types: Marijuana     Comment: few times daily   • Sexual activity: Defer           Objective   Physical Exam  Vitals reviewed.   Constitutional:       General: He is not in acute distress.     Appearance: Normal appearance. He is not ill-appearing, toxic-appearing or diaphoretic.   HENT:       Head: Normocephalic and atraumatic.      Right Ear: External ear normal.      Left Ear: External ear normal.      Nose: Nose normal.      Mouth/Throat:      Mouth: Mucous membranes are moist.      Pharynx: Oropharynx is clear.   Eyes:      Extraocular Movements: Extraocular movements intact.      Conjunctiva/sclera: Conjunctivae normal.      Pupils: Pupils are equal, round, and reactive to light.   Cardiovascular:      Rate and Rhythm: Normal rate and regular rhythm.      Pulses: Normal pulses.      Heart sounds: Normal heart sounds.   Pulmonary:      Effort: Pulmonary effort is normal. No respiratory distress.      Breath sounds: Normal breath sounds.   Abdominal:      General: Bowel sounds are normal. There is no distension.      Tenderness: There is no abdominal tenderness.   Musculoskeletal:         General: No swelling, tenderness or deformity. Normal range of motion.      Cervical back: Normal range of motion and neck supple.   Skin:     General: Skin is warm and dry.      Capillary Refill: Capillary refill takes less than 2 seconds.      Findings: No rash.   Neurological:      General: No focal deficit present.      Mental Status: He is alert and oriented to person, place, and time.      Comments: Left-sided drift of the upper extremity, some very mild decrease strength, decrease sensation left upper extremity, cranial nerves are intact, strength and sensation normal on the left upper extremity and both lower extremities   Psychiatric:         Mood and Affect: Mood normal.         Behavior: Behavior normal.         Procedures           ED Course                                                 MDM  Number of Diagnoses or Management Options  Acute ischemic right MCA stroke (HCC)  Diagnosis management comments: 27-year-old male with left arm numbness and weakness.  Well-developed, well-nourished young man in no distress with exam as above.  He does have risk factors for stroke, unfortunately he is not a  candidate for tPA given his current anticoagulation use.  Discussed with Dr. De La Cruz who is in agreement, recommended CT, CTA and CT perfusion of the brain.  We will proceed with those images, patient will likely be admitted for observation.  Disposition pending.    DDx: Stroke, coagulopathy    EKG interpreted by me: Sinus rhythm, normal rate, acute ST elevations, some nonspecific T waves, this is an abnormal EKG    04:44 EDT CT perfusion reveals concern for right MCA territory infarct.  I discussed this with Dr. De La Cruz, he advised wait for CTA, if LVO will need transfer.  If not we can manage here in [place].    05:26 EDT CTA does reveal an occlusion of a long branch of M2 segment right MCA.  Patient has had some progression of symptoms and now has some facial droop.  Discussed with neuro  at [facility] who graciously accepts patient in transfer.    35 minutes of critical care provided. This time excludes other billable procedures. Time does include preparation of documents, medical consultations, review of old records, and direct bedside care. Patient is at high risk for life-threatening deterioration due to stroke.       Critical Care  Total time providing critical care: 30-74 minutes      Final diagnoses:   Acute ischemic right MCA stroke (HCC)          Onel Haddad MD  05/24/22 3290

## 2022-05-25 ENCOUNTER — APPOINTMENT (OUTPATIENT)
Dept: CT IMAGING | Facility: HOSPITAL | Age: 27
End: 2022-05-25

## 2022-05-25 PROBLEM — I33.0 AORTIC VALVE VEGETATION: Status: ACTIVE | Noted: 2022-05-25

## 2022-05-25 LAB
ANION GAP SERPL CALCULATED.3IONS-SCNC: 10 MMOL/L (ref 5–15)
BASOPHILS # BLD AUTO: 0.06 10*3/MM3 (ref 0–0.2)
BASOPHILS NFR BLD AUTO: 0.7 % (ref 0–1.5)
BUN SERPL-MCNC: 11 MG/DL (ref 6–20)
BUN/CREAT SERPL: 12.8 (ref 7–25)
CALCIUM SPEC-SCNC: 8.9 MG/DL (ref 8.6–10.5)
CHLORIDE SERPL-SCNC: 100 MMOL/L (ref 98–107)
CHOLEST SERPL-MCNC: 133 MG/DL (ref 0–200)
CO2 SERPL-SCNC: 22 MMOL/L (ref 22–29)
CREAT SERPL-MCNC: 0.86 MG/DL (ref 0.76–1.27)
DEPRECATED RDW RBC AUTO: 38.8 FL (ref 37–54)
EGFRCR SERPLBLD CKD-EPI 2021: 123.2 ML/MIN/1.73
EOSINOPHIL # BLD AUTO: 0.11 10*3/MM3 (ref 0–0.4)
EOSINOPHIL NFR BLD AUTO: 1.3 % (ref 0.3–6.2)
ERYTHROCYTE [DISTWIDTH] IN BLOOD BY AUTOMATED COUNT: 12.4 % (ref 12.3–15.4)
GLUCOSE SERPL-MCNC: 104 MG/DL (ref 65–99)
HBA1C MFR BLD: 4.9 % (ref 4.8–5.6)
HCT VFR BLD AUTO: 39.4 % (ref 37.5–51)
HDLC SERPL-MCNC: 32 MG/DL (ref 40–60)
HGB BLD-MCNC: 12.9 G/DL (ref 13–17.7)
IMM GRANULOCYTES # BLD AUTO: 0.03 10*3/MM3 (ref 0–0.05)
IMM GRANULOCYTES NFR BLD AUTO: 0.3 % (ref 0–0.5)
LDLC SERPL CALC-MCNC: 78 MG/DL (ref 0–100)
LDLC/HDLC SERPL: 2.34 {RATIO}
LYMPHOCYTES # BLD AUTO: 2.16 10*3/MM3 (ref 0.7–3.1)
LYMPHOCYTES NFR BLD AUTO: 24.7 % (ref 19.6–45.3)
MAGNESIUM SERPL-MCNC: 1.8 MG/DL (ref 1.6–2.6)
MCH RBC QN AUTO: 28 PG (ref 26.6–33)
MCHC RBC AUTO-ENTMCNC: 32.7 G/DL (ref 31.5–35.7)
MCV RBC AUTO: 85.7 FL (ref 79–97)
MONOCYTES # BLD AUTO: 0.82 10*3/MM3 (ref 0.1–0.9)
MONOCYTES NFR BLD AUTO: 9.4 % (ref 5–12)
NEUTROPHILS NFR BLD AUTO: 5.55 10*3/MM3 (ref 1.7–7)
NEUTROPHILS NFR BLD AUTO: 63.6 % (ref 42.7–76)
NRBC BLD AUTO-RTO: 0 /100 WBC (ref 0–0.2)
PHOSPHATE SERPL-MCNC: 3.9 MG/DL (ref 2.5–4.5)
PLATELET # BLD AUTO: 71 10*3/MM3 (ref 140–450)
PMV BLD AUTO: 10.8 FL (ref 6–12)
POTASSIUM SERPL-SCNC: 3.4 MMOL/L (ref 3.5–5.2)
POTASSIUM SERPL-SCNC: 3.4 MMOL/L (ref 3.5–5.2)
RBC # BLD AUTO: 4.6 10*6/MM3 (ref 4.14–5.8)
SODIUM SERPL-SCNC: 132 MMOL/L (ref 136–145)
TRIGL SERPL-MCNC: 130 MG/DL (ref 0–150)
UFH PPP CHRO-ACNC: 0.11 IU/ML (ref 0.3–0.7)
UFH PPP CHRO-ACNC: 0.12 IU/ML (ref 0.3–0.7)
UFH PPP CHRO-ACNC: 0.12 IU/ML (ref 0.3–0.7)
UFH PPP CHRO-ACNC: >1.1 IU/ML (ref 0.3–0.7)
VLDLC SERPL-MCNC: 23 MG/DL (ref 5–40)
WBC NRBC COR # BLD: 8.73 10*3/MM3 (ref 3.4–10.8)

## 2022-05-25 PROCEDURE — 99233 SBSQ HOSP IP/OBS HIGH 50: CPT | Performed by: INTERNAL MEDICINE

## 2022-05-25 PROCEDURE — 92526 ORAL FUNCTION THERAPY: CPT

## 2022-05-25 PROCEDURE — 25010000002 HEPARIN (PORCINE) PER 1000 UNITS

## 2022-05-25 PROCEDURE — 99291 CRITICAL CARE FIRST HOUR: CPT | Performed by: PSYCHIATRY & NEUROLOGY

## 2022-05-25 PROCEDURE — 80048 BASIC METABOLIC PNL TOTAL CA: CPT | Performed by: INTERNAL MEDICINE

## 2022-05-25 PROCEDURE — 84100 ASSAY OF PHOSPHORUS: CPT | Performed by: INTERNAL MEDICINE

## 2022-05-25 PROCEDURE — 70450 CT HEAD/BRAIN W/O DYE: CPT

## 2022-05-25 PROCEDURE — 99232 SBSQ HOSP IP/OBS MODERATE 35: CPT | Performed by: INTERNAL MEDICINE

## 2022-05-25 PROCEDURE — 84132 ASSAY OF SERUM POTASSIUM: CPT | Performed by: INTERNAL MEDICINE

## 2022-05-25 PROCEDURE — 83036 HEMOGLOBIN GLYCOSYLATED A1C: CPT | Performed by: NURSE PRACTITIONER

## 2022-05-25 PROCEDURE — 97166 OT EVAL MOD COMPLEX 45 MIN: CPT

## 2022-05-25 PROCEDURE — 87040 BLOOD CULTURE FOR BACTERIA: CPT | Performed by: INTERNAL MEDICINE

## 2022-05-25 PROCEDURE — 83735 ASSAY OF MAGNESIUM: CPT | Performed by: INTERNAL MEDICINE

## 2022-05-25 PROCEDURE — 85520 HEPARIN ASSAY: CPT

## 2022-05-25 PROCEDURE — 80061 LIPID PANEL: CPT | Performed by: NURSE PRACTITIONER

## 2022-05-25 PROCEDURE — 92523 SPEECH SOUND LANG COMPREHEN: CPT

## 2022-05-25 PROCEDURE — 85025 COMPLETE CBC W/AUTO DIFF WBC: CPT | Performed by: NURSE PRACTITIONER

## 2022-05-25 PROCEDURE — 97162 PT EVAL MOD COMPLEX 30 MIN: CPT

## 2022-05-25 PROCEDURE — 97110 THERAPEUTIC EXERCISES: CPT

## 2022-05-25 PROCEDURE — 0 POTASSIUM CHLORIDE 10 MEQ/100ML SOLUTION

## 2022-05-25 PROCEDURE — 0 MAGNESIUM SULFATE 4 GM/100ML SOLUTION: Performed by: INTERNAL MEDICINE

## 2022-05-25 RX ORDER — MAGNESIUM SULFATE HEPTAHYDRATE 40 MG/ML
2 INJECTION, SOLUTION INTRAVENOUS AS NEEDED
Status: DISCONTINUED | OUTPATIENT
Start: 2022-05-25 | End: 2022-06-02 | Stop reason: HOSPADM

## 2022-05-25 RX ORDER — CYCLOBENZAPRINE HCL 10 MG
10 TABLET ORAL ONCE
Status: COMPLETED | OUTPATIENT
Start: 2022-05-25 | End: 2022-05-25

## 2022-05-25 RX ORDER — MAGNESIUM SULFATE HEPTAHYDRATE 40 MG/ML
4 INJECTION, SOLUTION INTRAVENOUS AS NEEDED
Status: DISCONTINUED | OUTPATIENT
Start: 2022-05-25 | End: 2022-06-02 | Stop reason: HOSPADM

## 2022-05-25 RX ADMIN — MAGNESIUM SULFATE HEPTAHYDRATE 4 G: 40 INJECTION, SOLUTION INTRAVENOUS at 10:43

## 2022-05-25 RX ADMIN — POTASSIUM CHLORIDE 10 MEQ: 7.46 INJECTION, SOLUTION INTRAVENOUS at 10:44

## 2022-05-25 RX ADMIN — POTASSIUM CHLORIDE 10 MEQ: 7.46 INJECTION, SOLUTION INTRAVENOUS at 08:41

## 2022-05-25 RX ADMIN — ATORVASTATIN CALCIUM 80 MG: 40 TABLET, FILM COATED ORAL at 20:52

## 2022-05-25 RX ADMIN — HEPARIN SODIUM 17 UNITS/KG/HR: 5000 INJECTION, SOLUTION INTRAVENOUS; SUBCUTANEOUS at 10:43

## 2022-05-25 RX ADMIN — ASPIRIN 81 MG 81 MG: 81 TABLET ORAL at 08:41

## 2022-05-25 RX ADMIN — CYCLOBENZAPRINE 10 MG: 10 TABLET, FILM COATED ORAL at 21:35

## 2022-05-26 ENCOUNTER — APPOINTMENT (OUTPATIENT)
Dept: CT IMAGING | Facility: HOSPITAL | Age: 27
End: 2022-05-26

## 2022-05-26 LAB
ANION GAP SERPL CALCULATED.3IONS-SCNC: 15 MMOL/L (ref 5–15)
BUN SERPL-MCNC: 12 MG/DL (ref 6–20)
BUN/CREAT SERPL: 14.1 (ref 7–25)
CALCIUM SPEC-SCNC: 9 MG/DL (ref 8.6–10.5)
CHLORIDE SERPL-SCNC: 98 MMOL/L (ref 98–107)
CO2 SERPL-SCNC: 22 MMOL/L (ref 22–29)
CREAT SERPL-MCNC: 0.85 MG/DL (ref 0.76–1.27)
DEPRECATED RDW RBC AUTO: 38 FL (ref 37–54)
EGFRCR SERPLBLD CKD-EPI 2021: 123.7 ML/MIN/1.73
ERYTHROCYTE [DISTWIDTH] IN BLOOD BY AUTOMATED COUNT: 12.1 % (ref 12.3–15.4)
GLUCOSE BLDC GLUCOMTR-MCNC: 104 MG/DL (ref 70–130)
GLUCOSE SERPL-MCNC: 94 MG/DL (ref 65–99)
HCT VFR BLD AUTO: 40.8 % (ref 37.5–51)
HGB BLD-MCNC: 13.3 G/DL (ref 13–17.7)
MAGNESIUM SERPL-MCNC: 2.1 MG/DL (ref 1.6–2.6)
MCH RBC QN AUTO: 27.9 PG (ref 26.6–33)
MCHC RBC AUTO-ENTMCNC: 32.6 G/DL (ref 31.5–35.7)
MCV RBC AUTO: 85.7 FL (ref 79–97)
PLATELET # BLD AUTO: 78 10*3/MM3 (ref 140–450)
PMV BLD AUTO: 10.3 FL (ref 6–12)
POTASSIUM SERPL-SCNC: 3.7 MMOL/L (ref 3.5–5.2)
POTASSIUM SERPL-SCNC: 3.8 MMOL/L (ref 3.5–5.2)
RBC # BLD AUTO: 4.76 10*6/MM3 (ref 4.14–5.8)
SODIUM SERPL-SCNC: 135 MMOL/L (ref 136–145)
UFH PPP CHRO-ACNC: 0.14 IU/ML (ref 0.3–0.7)
UFH PPP CHRO-ACNC: 0.18 IU/ML (ref 0.3–0.7)
UFH PPP CHRO-ACNC: 0.24 IU/ML (ref 0.3–0.7)
UFH PPP CHRO-ACNC: 0.62 IU/ML (ref 0.3–0.7)
WBC NRBC COR # BLD: 7.34 10*3/MM3 (ref 3.4–10.8)

## 2022-05-26 PROCEDURE — 70450 CT HEAD/BRAIN W/O DYE: CPT

## 2022-05-26 PROCEDURE — 97110 THERAPEUTIC EXERCISES: CPT

## 2022-05-26 PROCEDURE — 99231 SBSQ HOSP IP/OBS SF/LOW 25: CPT | Performed by: PHYSICIAN ASSISTANT

## 2022-05-26 PROCEDURE — 80048 BASIC METABOLIC PNL TOTAL CA: CPT | Performed by: INTERNAL MEDICINE

## 2022-05-26 PROCEDURE — 97116 GAIT TRAINING THERAPY: CPT

## 2022-05-26 PROCEDURE — 99291 CRITICAL CARE FIRST HOUR: CPT | Performed by: PSYCHIATRY & NEUROLOGY

## 2022-05-26 PROCEDURE — 85520 HEPARIN ASSAY: CPT | Performed by: NURSE PRACTITIONER

## 2022-05-26 PROCEDURE — 99222 1ST HOSP IP/OBS MODERATE 55: CPT | Performed by: INTERNAL MEDICINE

## 2022-05-26 PROCEDURE — 25010000002 HEPARIN (PORCINE) PER 1000 UNITS

## 2022-05-26 PROCEDURE — 84132 ASSAY OF SERUM POTASSIUM: CPT | Performed by: NURSE PRACTITIONER

## 2022-05-26 PROCEDURE — 85520 HEPARIN ASSAY: CPT

## 2022-05-26 PROCEDURE — 83735 ASSAY OF MAGNESIUM: CPT | Performed by: INTERNAL MEDICINE

## 2022-05-26 PROCEDURE — 85027 COMPLETE CBC AUTOMATED: CPT | Performed by: INTERNAL MEDICINE

## 2022-05-26 PROCEDURE — 99232 SBSQ HOSP IP/OBS MODERATE 35: CPT | Performed by: INTERNAL MEDICINE

## 2022-05-26 PROCEDURE — 25010000002 ENOXAPARIN PER 10 MG: Performed by: PSYCHIATRY & NEUROLOGY

## 2022-05-26 RX ORDER — FLUOXETINE 10 MG/1
10 CAPSULE ORAL DAILY
Status: DISCONTINUED | OUTPATIENT
Start: 2022-05-26 | End: 2022-06-02 | Stop reason: HOSPADM

## 2022-05-26 RX ORDER — CYCLOBENZAPRINE HCL 10 MG
5 TABLET ORAL 3 TIMES DAILY PRN
Status: DISCONTINUED | OUTPATIENT
Start: 2022-05-26 | End: 2022-06-02 | Stop reason: HOSPADM

## 2022-05-26 RX ORDER — ENOXAPARIN SODIUM 100 MG/ML
1 INJECTION SUBCUTANEOUS EVERY 12 HOURS
Status: DISCONTINUED | OUTPATIENT
Start: 2022-05-26 | End: 2022-05-28

## 2022-05-26 RX ADMIN — ATORVASTATIN CALCIUM 80 MG: 40 TABLET, FILM COATED ORAL at 21:02

## 2022-05-26 RX ADMIN — Medication 10 ML: at 21:02

## 2022-05-26 RX ADMIN — ENOXAPARIN SODIUM 80 MG: 80 INJECTION SUBCUTANEOUS at 16:26

## 2022-05-26 RX ADMIN — FLUOXETINE 10 MG: 10 CAPSULE ORAL at 12:29

## 2022-05-26 RX ADMIN — HEPARIN SODIUM 22 UNITS/KG/HR: 5000 INJECTION, SOLUTION INTRAVENOUS; SUBCUTANEOUS at 02:11

## 2022-05-26 RX ADMIN — ASPIRIN 81 MG 81 MG: 81 TABLET ORAL at 10:27

## 2022-05-27 ENCOUNTER — APPOINTMENT (OUTPATIENT)
Dept: CT IMAGING | Facility: HOSPITAL | Age: 27
End: 2022-05-27

## 2022-05-27 LAB
ANION GAP SERPL CALCULATED.3IONS-SCNC: 14 MMOL/L (ref 5–15)
BUN SERPL-MCNC: 15 MG/DL (ref 6–20)
BUN/CREAT SERPL: 16.7 (ref 7–25)
CALCIUM SPEC-SCNC: 9.6 MG/DL (ref 8.6–10.5)
CHLORIDE SERPL-SCNC: 96 MMOL/L (ref 98–107)
CO2 SERPL-SCNC: 21 MMOL/L (ref 22–29)
CREAT SERPL-MCNC: 0.9 MG/DL (ref 0.76–1.27)
DEPRECATED RDW RBC AUTO: 38.5 FL (ref 37–54)
EGFRCR SERPLBLD CKD-EPI 2021: 121.6 ML/MIN/1.73
ERYTHROCYTE [DISTWIDTH] IN BLOOD BY AUTOMATED COUNT: 11.9 % (ref 12.3–15.4)
GLUCOSE SERPL-MCNC: 88 MG/DL (ref 65–99)
HCT VFR BLD AUTO: 43.5 % (ref 37.5–51)
HGB BLD-MCNC: 14.1 G/DL (ref 13–17.7)
MAGNESIUM SERPL-MCNC: 2 MG/DL (ref 1.6–2.6)
MCH RBC QN AUTO: 28.5 PG (ref 26.6–33)
MCHC RBC AUTO-ENTMCNC: 32.4 G/DL (ref 31.5–35.7)
MCV RBC AUTO: 88.1 FL (ref 79–97)
PHOSPHATE SERPL-MCNC: 3.9 MG/DL (ref 2.5–4.5)
PLATELET # BLD AUTO: 79 10*3/MM3 (ref 140–450)
PMV BLD AUTO: 10.5 FL (ref 6–12)
POTASSIUM SERPL-SCNC: 4 MMOL/L (ref 3.5–5.2)
RBC # BLD AUTO: 4.94 10*6/MM3 (ref 4.14–5.8)
SODIUM SERPL-SCNC: 131 MMOL/L (ref 136–145)
WBC NRBC COR # BLD: 6.28 10*3/MM3 (ref 3.4–10.8)

## 2022-05-27 PROCEDURE — 83735 ASSAY OF MAGNESIUM: CPT | Performed by: NURSE PRACTITIONER

## 2022-05-27 PROCEDURE — 99232 SBSQ HOSP IP/OBS MODERATE 35: CPT | Performed by: INTERNAL MEDICINE

## 2022-05-27 PROCEDURE — 85027 COMPLETE CBC AUTOMATED: CPT | Performed by: NURSE PRACTITIONER

## 2022-05-27 PROCEDURE — 99231 SBSQ HOSP IP/OBS SF/LOW 25: CPT | Performed by: NURSE PRACTITIONER

## 2022-05-27 PROCEDURE — 70450 CT HEAD/BRAIN W/O DYE: CPT

## 2022-05-27 PROCEDURE — 84100 ASSAY OF PHOSPHORUS: CPT | Performed by: NURSE PRACTITIONER

## 2022-05-27 PROCEDURE — 80048 BASIC METABOLIC PNL TOTAL CA: CPT | Performed by: NURSE PRACTITIONER

## 2022-05-27 PROCEDURE — 25010000002 ENOXAPARIN PER 10 MG: Performed by: PSYCHIATRY & NEUROLOGY

## 2022-05-27 PROCEDURE — 99233 SBSQ HOSP IP/OBS HIGH 50: CPT | Performed by: INTERNAL MEDICINE

## 2022-05-27 RX ADMIN — ASPIRIN 81 MG 81 MG: 81 TABLET ORAL at 11:11

## 2022-05-27 RX ADMIN — ENOXAPARIN SODIUM 80 MG: 80 INJECTION SUBCUTANEOUS at 15:36

## 2022-05-27 RX ADMIN — ENOXAPARIN SODIUM 80 MG: 80 INJECTION SUBCUTANEOUS at 04:57

## 2022-05-27 RX ADMIN — ATORVASTATIN CALCIUM 80 MG: 40 TABLET, FILM COATED ORAL at 21:10

## 2022-05-27 RX ADMIN — FLUOXETINE 10 MG: 10 CAPSULE ORAL at 11:11

## 2022-05-27 RX ADMIN — Medication 10 ML: at 21:10

## 2022-05-28 LAB
BASOPHILS # BLD AUTO: 0.06 10*3/MM3 (ref 0–0.2)
BASOPHILS NFR BLD AUTO: 0.8 % (ref 0–1.5)
DEPRECATED RDW RBC AUTO: 37.2 FL (ref 37–54)
EOSINOPHIL # BLD AUTO: 0.17 10*3/MM3 (ref 0–0.4)
EOSINOPHIL NFR BLD AUTO: 2.2 % (ref 0.3–6.2)
ERYTHROCYTE [DISTWIDTH] IN BLOOD BY AUTOMATED COUNT: 11.9 % (ref 12.3–15.4)
HCT VFR BLD AUTO: 40.8 % (ref 37.5–51)
HGB BLD-MCNC: 13.7 G/DL (ref 13–17.7)
IMM GRANULOCYTES # BLD AUTO: 0.03 10*3/MM3 (ref 0–0.05)
IMM GRANULOCYTES NFR BLD AUTO: 0.4 % (ref 0–0.5)
LYMPHOCYTES # BLD AUTO: 1.63 10*3/MM3 (ref 0.7–3.1)
LYMPHOCYTES NFR BLD AUTO: 21.1 % (ref 19.6–45.3)
MAGNESIUM SERPL-MCNC: 2.3 MG/DL (ref 1.6–2.6)
MCH RBC QN AUTO: 28.7 PG (ref 26.6–33)
MCHC RBC AUTO-ENTMCNC: 33.6 G/DL (ref 31.5–35.7)
MCV RBC AUTO: 85.5 FL (ref 79–97)
MONOCYTES # BLD AUTO: 0.85 10*3/MM3 (ref 0.1–0.9)
MONOCYTES NFR BLD AUTO: 11 % (ref 5–12)
NEUTROPHILS NFR BLD AUTO: 4.99 10*3/MM3 (ref 1.7–7)
NEUTROPHILS NFR BLD AUTO: 64.5 % (ref 42.7–76)
NRBC BLD AUTO-RTO: 0 /100 WBC (ref 0–0.2)
PLATELET # BLD AUTO: 86 10*3/MM3 (ref 140–450)
PMV BLD AUTO: 10.6 FL (ref 6–12)
POTASSIUM SERPL-SCNC: 3.8 MMOL/L (ref 3.5–5.2)
RBC # BLD AUTO: 4.77 10*6/MM3 (ref 4.14–5.8)
WBC NRBC COR # BLD: 7.73 10*3/MM3 (ref 3.4–10.8)

## 2022-05-28 PROCEDURE — 85025 COMPLETE CBC W/AUTO DIFF WBC: CPT | Performed by: NURSE PRACTITIONER

## 2022-05-28 PROCEDURE — 99232 SBSQ HOSP IP/OBS MODERATE 35: CPT | Performed by: INTERNAL MEDICINE

## 2022-05-28 PROCEDURE — 25010000002 ONDANSETRON PER 1 MG: Performed by: NURSE PRACTITIONER

## 2022-05-28 PROCEDURE — 25010000002 ENOXAPARIN PER 10 MG: Performed by: PSYCHIATRY & NEUROLOGY

## 2022-05-28 PROCEDURE — 83735 ASSAY OF MAGNESIUM: CPT | Performed by: INTERNAL MEDICINE

## 2022-05-28 PROCEDURE — 84132 ASSAY OF SERUM POTASSIUM: CPT | Performed by: INTERNAL MEDICINE

## 2022-05-28 PROCEDURE — 99232 SBSQ HOSP IP/OBS MODERATE 35: CPT | Performed by: NURSE PRACTITIONER

## 2022-05-28 RX ORDER — ENOXAPARIN SODIUM 100 MG/ML
1 INJECTION SUBCUTANEOUS EVERY 12 HOURS
Status: DISCONTINUED | OUTPATIENT
Start: 2022-05-29 | End: 2022-06-02 | Stop reason: HOSPADM

## 2022-05-28 RX ADMIN — ENOXAPARIN SODIUM 80 MG: 80 INJECTION SUBCUTANEOUS at 16:36

## 2022-05-28 RX ADMIN — ATORVASTATIN CALCIUM 80 MG: 40 TABLET, FILM COATED ORAL at 20:34

## 2022-05-28 RX ADMIN — Medication 10 ML: at 09:20

## 2022-05-28 RX ADMIN — CYCLOBENZAPRINE 5 MG: 10 TABLET, FILM COATED ORAL at 14:38

## 2022-05-28 RX ADMIN — ASPIRIN 81 MG 81 MG: 81 TABLET ORAL at 09:20

## 2022-05-28 RX ADMIN — ONDANSETRON 4 MG: 2 INJECTION INTRAMUSCULAR; INTRAVENOUS at 15:31

## 2022-05-28 RX ADMIN — Medication 10 ML: at 20:34

## 2022-05-28 RX ADMIN — FLUOXETINE 10 MG: 10 CAPSULE ORAL at 09:20

## 2022-05-28 RX ADMIN — ENOXAPARIN SODIUM 80 MG: 80 INJECTION SUBCUTANEOUS at 04:21

## 2022-05-28 RX ADMIN — ONDANSETRON 4 MG: 2 INJECTION INTRAMUSCULAR; INTRAVENOUS at 22:52

## 2022-05-29 LAB
ALBUMIN SERPL-MCNC: 4.1 G/DL (ref 3.5–5.2)
ALBUMIN/GLOB SERPL: 1 G/DL
ALP SERPL-CCNC: 65 U/L (ref 39–117)
ALT SERPL W P-5'-P-CCNC: 22 U/L (ref 1–41)
ANION GAP SERPL CALCULATED.3IONS-SCNC: 14 MMOL/L (ref 5–15)
AST SERPL-CCNC: 20 U/L (ref 1–40)
BACTERIA SPEC AEROBE CULT: NORMAL
BASOPHILS # BLD AUTO: 0.04 10*3/MM3 (ref 0–0.2)
BASOPHILS NFR BLD AUTO: 0.5 % (ref 0–1.5)
BILIRUB SERPL-MCNC: 0.7 MG/DL (ref 0–1.2)
BUN SERPL-MCNC: 16 MG/DL (ref 6–20)
BUN/CREAT SERPL: 16 (ref 7–25)
CALCIUM SPEC-SCNC: 9.3 MG/DL (ref 8.6–10.5)
CHLORIDE SERPL-SCNC: 92 MMOL/L (ref 98–107)
CO2 SERPL-SCNC: 22 MMOL/L (ref 22–29)
CREAT SERPL-MCNC: 1 MG/DL (ref 0.76–1.27)
DEPRECATED RDW RBC AUTO: 36.9 FL (ref 37–54)
EGFRCR SERPLBLD CKD-EPI 2021: 107.1 ML/MIN/1.73
EOSINOPHIL # BLD AUTO: 0.17 10*3/MM3 (ref 0–0.4)
EOSINOPHIL NFR BLD AUTO: 2.2 % (ref 0.3–6.2)
ERYTHROCYTE [DISTWIDTH] IN BLOOD BY AUTOMATED COUNT: 12.1 % (ref 12.3–15.4)
GLOBULIN UR ELPH-MCNC: 4.3 GM/DL
GLUCOSE SERPL-MCNC: 92 MG/DL (ref 65–99)
HCT VFR BLD AUTO: 41.4 % (ref 37.5–51)
HGB BLD-MCNC: 14.1 G/DL (ref 13–17.7)
IMM GRANULOCYTES # BLD AUTO: 0.02 10*3/MM3 (ref 0–0.05)
IMM GRANULOCYTES NFR BLD AUTO: 0.3 % (ref 0–0.5)
LYMPHOCYTES # BLD AUTO: 1.38 10*3/MM3 (ref 0.7–3.1)
LYMPHOCYTES NFR BLD AUTO: 17.7 % (ref 19.6–45.3)
MAGNESIUM SERPL-MCNC: 1.9 MG/DL (ref 1.6–2.6)
MCH RBC QN AUTO: 28.4 PG (ref 26.6–33)
MCHC RBC AUTO-ENTMCNC: 34.1 G/DL (ref 31.5–35.7)
MCV RBC AUTO: 83.3 FL (ref 79–97)
MONOCYTES # BLD AUTO: 0.86 10*3/MM3 (ref 0.1–0.9)
MONOCYTES NFR BLD AUTO: 11 % (ref 5–12)
NEUTROPHILS NFR BLD AUTO: 5.34 10*3/MM3 (ref 1.7–7)
NEUTROPHILS NFR BLD AUTO: 68.3 % (ref 42.7–76)
NRBC BLD AUTO-RTO: 0 /100 WBC (ref 0–0.2)
PHOSPHATE SERPL-MCNC: 4.8 MG/DL (ref 2.5–4.5)
PLATELET # BLD AUTO: 88 10*3/MM3 (ref 140–450)
PMV BLD AUTO: 11 FL (ref 6–12)
POTASSIUM SERPL-SCNC: 3.9 MMOL/L (ref 3.5–5.2)
PROT SERPL-MCNC: 8.4 G/DL (ref 6–8.5)
RBC # BLD AUTO: 4.97 10*6/MM3 (ref 4.14–5.8)
SODIUM SERPL-SCNC: 128 MMOL/L (ref 136–145)
WBC NRBC COR # BLD: 7.81 10*3/MM3 (ref 3.4–10.8)

## 2022-05-29 PROCEDURE — 80053 COMPREHEN METABOLIC PANEL: CPT | Performed by: INTERNAL MEDICINE

## 2022-05-29 PROCEDURE — 99232 SBSQ HOSP IP/OBS MODERATE 35: CPT | Performed by: NURSE PRACTITIONER

## 2022-05-29 PROCEDURE — 97110 THERAPEUTIC EXERCISES: CPT

## 2022-05-29 PROCEDURE — 0 MAGNESIUM SULFATE 4 GM/100ML SOLUTION: Performed by: INTERNAL MEDICINE

## 2022-05-29 PROCEDURE — 97530 THERAPEUTIC ACTIVITIES: CPT

## 2022-05-29 PROCEDURE — 85025 COMPLETE CBC W/AUTO DIFF WBC: CPT | Performed by: INTERNAL MEDICINE

## 2022-05-29 PROCEDURE — 83735 ASSAY OF MAGNESIUM: CPT | Performed by: INTERNAL MEDICINE

## 2022-05-29 PROCEDURE — 97116 GAIT TRAINING THERAPY: CPT

## 2022-05-29 PROCEDURE — 25010000002 ENOXAPARIN PER 10 MG: Performed by: PSYCHIATRY & NEUROLOGY

## 2022-05-29 PROCEDURE — 84100 ASSAY OF PHOSPHORUS: CPT | Performed by: INTERNAL MEDICINE

## 2022-05-29 RX ADMIN — ENOXAPARIN SODIUM 80 MG: 80 INJECTION SUBCUTANEOUS at 06:37

## 2022-05-29 RX ADMIN — MAGNESIUM SULFATE HEPTAHYDRATE 4 G: 40 INJECTION, SOLUTION INTRAVENOUS at 08:30

## 2022-05-29 RX ADMIN — ATORVASTATIN CALCIUM 80 MG: 40 TABLET, FILM COATED ORAL at 20:35

## 2022-05-29 RX ADMIN — ASPIRIN 81 MG 81 MG: 81 TABLET ORAL at 08:30

## 2022-05-29 RX ADMIN — ENOXAPARIN SODIUM 80 MG: 80 INJECTION SUBCUTANEOUS at 19:07

## 2022-05-29 RX ADMIN — Medication 10 ML: at 20:36

## 2022-05-29 RX ADMIN — Medication 10 ML: at 08:31

## 2022-05-29 RX ADMIN — FLUOXETINE 10 MG: 10 CAPSULE ORAL at 08:30

## 2022-05-30 LAB
BACTERIA SPEC AEROBE CULT: NORMAL
INR PPP: 1.21 (ref 0.84–1.13)
PROTHROMBIN TIME: 15.2 SECONDS (ref 11.4–14.4)

## 2022-05-30 PROCEDURE — 99231 SBSQ HOSP IP/OBS SF/LOW 25: CPT | Performed by: FAMILY MEDICINE

## 2022-05-30 PROCEDURE — 85610 PROTHROMBIN TIME: CPT | Performed by: INTERNAL MEDICINE

## 2022-05-30 PROCEDURE — 99232 SBSQ HOSP IP/OBS MODERATE 35: CPT | Performed by: INTERNAL MEDICINE

## 2022-05-30 PROCEDURE — 99232 SBSQ HOSP IP/OBS MODERATE 35: CPT | Performed by: NURSE PRACTITIONER

## 2022-05-30 PROCEDURE — 25010000002 ENOXAPARIN PER 10 MG: Performed by: PSYCHIATRY & NEUROLOGY

## 2022-05-30 RX ORDER — WARFARIN SODIUM 5 MG/1
5 TABLET ORAL
Status: DISCONTINUED | OUTPATIENT
Start: 2022-05-30 | End: 2022-06-01

## 2022-05-30 RX ADMIN — ENOXAPARIN SODIUM 80 MG: 80 INJECTION SUBCUTANEOUS at 07:11

## 2022-05-30 RX ADMIN — FLUOXETINE 10 MG: 10 CAPSULE ORAL at 09:58

## 2022-05-30 RX ADMIN — ATORVASTATIN CALCIUM 80 MG: 40 TABLET, FILM COATED ORAL at 20:19

## 2022-05-30 RX ADMIN — Medication 10 ML: at 20:19

## 2022-05-30 RX ADMIN — WARFARIN SODIUM 5 MG: 5 TABLET ORAL at 18:51

## 2022-05-30 RX ADMIN — ENOXAPARIN SODIUM 80 MG: 80 INJECTION SUBCUTANEOUS at 18:51

## 2022-05-30 RX ADMIN — Medication 10 ML: at 09:58

## 2022-05-30 RX ADMIN — ASPIRIN 81 MG 81 MG: 81 TABLET ORAL at 09:58

## 2022-05-31 LAB
BASOPHILS # BLD AUTO: 0.08 10*3/MM3 (ref 0–0.2)
BASOPHILS NFR BLD AUTO: 0.9 % (ref 0–1.5)
DEPRECATED RDW RBC AUTO: 35.7 FL (ref 37–54)
EOSINOPHIL # BLD AUTO: 0.23 10*3/MM3 (ref 0–0.4)
EOSINOPHIL NFR BLD AUTO: 2.5 % (ref 0.3–6.2)
ERYTHROCYTE [DISTWIDTH] IN BLOOD BY AUTOMATED COUNT: 12 % (ref 12.3–15.4)
HCT VFR BLD AUTO: 38.8 % (ref 37.5–51)
HGB BLD-MCNC: 13.5 G/DL (ref 13–17.7)
IMM GRANULOCYTES # BLD AUTO: 0.03 10*3/MM3 (ref 0–0.05)
IMM GRANULOCYTES NFR BLD AUTO: 0.3 % (ref 0–0.5)
INR PPP: 1.18 (ref 0.84–1.13)
LYMPHOCYTES # BLD AUTO: 1.9 10*3/MM3 (ref 0.7–3.1)
LYMPHOCYTES NFR BLD AUTO: 21 % (ref 19.6–45.3)
MCH RBC QN AUTO: 28.4 PG (ref 26.6–33)
MCHC RBC AUTO-ENTMCNC: 34.8 G/DL (ref 31.5–35.7)
MCV RBC AUTO: 81.5 FL (ref 79–97)
MONOCYTES # BLD AUTO: 0.93 10*3/MM3 (ref 0.1–0.9)
MONOCYTES NFR BLD AUTO: 10.3 % (ref 5–12)
NEUTROPHILS NFR BLD AUTO: 5.88 10*3/MM3 (ref 1.7–7)
NEUTROPHILS NFR BLD AUTO: 65 % (ref 42.7–76)
NRBC BLD AUTO-RTO: 0 /100 WBC (ref 0–0.2)
PLATELET # BLD AUTO: 103 10*3/MM3 (ref 140–450)
PMV BLD AUTO: 10.4 FL (ref 6–12)
PROTHROMBIN TIME: 15 SECONDS (ref 11.4–14.4)
RBC # BLD AUTO: 4.76 10*6/MM3 (ref 4.14–5.8)
WBC NRBC COR # BLD: 9.05 10*3/MM3 (ref 3.4–10.8)

## 2022-05-31 PROCEDURE — 85610 PROTHROMBIN TIME: CPT | Performed by: INTERNAL MEDICINE

## 2022-05-31 PROCEDURE — 25010000002 ONDANSETRON PER 1 MG: Performed by: NURSE PRACTITIONER

## 2022-05-31 PROCEDURE — 99232 SBSQ HOSP IP/OBS MODERATE 35: CPT | Performed by: FAMILY MEDICINE

## 2022-05-31 PROCEDURE — 93005 ELECTROCARDIOGRAM TRACING: CPT | Performed by: INTERNAL MEDICINE

## 2022-05-31 PROCEDURE — 97530 THERAPEUTIC ACTIVITIES: CPT

## 2022-05-31 PROCEDURE — 99232 SBSQ HOSP IP/OBS MODERATE 35: CPT | Performed by: INTERNAL MEDICINE

## 2022-05-31 PROCEDURE — 93010 ELECTROCARDIOGRAM REPORT: CPT | Performed by: INTERNAL MEDICINE

## 2022-05-31 PROCEDURE — 25010000002 ENOXAPARIN PER 10 MG: Performed by: PSYCHIATRY & NEUROLOGY

## 2022-05-31 PROCEDURE — 97110 THERAPEUTIC EXERCISES: CPT

## 2022-05-31 PROCEDURE — 97116 GAIT TRAINING THERAPY: CPT

## 2022-05-31 PROCEDURE — 85025 COMPLETE CBC W/AUTO DIFF WBC: CPT | Performed by: NURSE PRACTITIONER

## 2022-05-31 PROCEDURE — 92526 ORAL FUNCTION THERAPY: CPT

## 2022-05-31 RX ORDER — METOPROLOL TARTRATE 5 MG/5ML
5 INJECTION INTRAVENOUS EVERY 6 HOURS PRN
Status: DISCONTINUED | OUTPATIENT
Start: 2022-05-31 | End: 2022-06-02 | Stop reason: HOSPADM

## 2022-05-31 RX ADMIN — FLUOXETINE 10 MG: 10 CAPSULE ORAL at 08:18

## 2022-05-31 RX ADMIN — ONDANSETRON 4 MG: 2 INJECTION INTRAMUSCULAR; INTRAVENOUS at 17:20

## 2022-05-31 RX ADMIN — METOPROLOL TARTRATE 25 MG: 25 TABLET, FILM COATED ORAL at 16:49

## 2022-05-31 RX ADMIN — ASPIRIN 81 MG 81 MG: 81 TABLET ORAL at 08:18

## 2022-05-31 RX ADMIN — Medication 10 ML: at 08:18

## 2022-05-31 RX ADMIN — ATORVASTATIN CALCIUM 80 MG: 40 TABLET, FILM COATED ORAL at 20:08

## 2022-05-31 RX ADMIN — WARFARIN SODIUM 5 MG: 5 TABLET ORAL at 16:57

## 2022-05-31 RX ADMIN — METOPROLOL TARTRATE 5 MG: 5 INJECTION INTRAVENOUS at 17:20

## 2022-05-31 RX ADMIN — ENOXAPARIN SODIUM 80 MG: 80 INJECTION SUBCUTANEOUS at 06:15

## 2022-05-31 RX ADMIN — ENOXAPARIN SODIUM 80 MG: 80 INJECTION SUBCUTANEOUS at 16:58

## 2022-05-31 RX ADMIN — Medication 10 ML: at 20:06

## 2022-06-01 LAB
INR PPP: 1.24 (ref 0.84–1.13)
PROTHROMBIN TIME: 15.5 SECONDS (ref 11.4–14.4)

## 2022-06-01 PROCEDURE — 97530 THERAPEUTIC ACTIVITIES: CPT

## 2022-06-01 PROCEDURE — 99232 SBSQ HOSP IP/OBS MODERATE 35: CPT | Performed by: INTERNAL MEDICINE

## 2022-06-01 PROCEDURE — 97110 THERAPEUTIC EXERCISES: CPT

## 2022-06-01 PROCEDURE — 85610 PROTHROMBIN TIME: CPT | Performed by: INTERNAL MEDICINE

## 2022-06-01 PROCEDURE — 25010000002 ENOXAPARIN PER 10 MG: Performed by: PSYCHIATRY & NEUROLOGY

## 2022-06-01 RX ORDER — WARFARIN SODIUM 7.5 MG/1
7.5 TABLET ORAL
Status: DISCONTINUED | OUTPATIENT
Start: 2022-06-01 | End: 2022-06-02

## 2022-06-01 RX ADMIN — Medication 10 ML: at 21:28

## 2022-06-01 RX ADMIN — ASPIRIN 81 MG 81 MG: 81 TABLET ORAL at 09:24

## 2022-06-01 RX ADMIN — ATORVASTATIN CALCIUM 80 MG: 40 TABLET, FILM COATED ORAL at 21:27

## 2022-06-01 RX ADMIN — ENOXAPARIN SODIUM 80 MG: 80 INJECTION SUBCUTANEOUS at 17:40

## 2022-06-01 RX ADMIN — WARFARIN SODIUM 7.5 MG: 7.5 TABLET ORAL at 17:41

## 2022-06-01 RX ADMIN — FLUOXETINE 10 MG: 10 CAPSULE ORAL at 09:25

## 2022-06-01 RX ADMIN — Medication 10 ML: at 09:24

## 2022-06-01 RX ADMIN — METOPROLOL TARTRATE 25 MG: 25 TABLET, FILM COATED ORAL at 09:24

## 2022-06-01 RX ADMIN — ENOXAPARIN SODIUM 80 MG: 80 INJECTION SUBCUTANEOUS at 05:55

## 2022-06-01 RX ADMIN — METOPROLOL TARTRATE 25 MG: 25 TABLET, FILM COATED ORAL at 21:27

## 2022-06-01 NOTE — THERAPY TREATMENT NOTE
Patient Name: Vargas Reyna  : 1995    MRN: [id number]                              Today's Date: 2022       Admit Date: 2022    Visit Dx:     ICD-10-CM ICD-9-CM   1. Acute ischemic right MCA stroke (HCC)  I63.511 434.91   2. Dysarthria  R47.1 784.51   3. Cognitive communication deficit  R41.841 799.52   4. Oral phase dysphagia  R13.11 787.21   5. Aortic valve vegetation  I33.0 421.0   6. Acute deep vein thrombosis (DVT) of distal vein of both lower extremities (HCC)  I82.4Z3 453.42     Patient Active Problem List   Diagnosis   • Tachycardia   • Thrombocytopenia (HCC)   • Acute deep vein thrombosis (DVT) of distal vein of both lower extremities (HCC)   • Antiphospholipid syndrome (HCC)   • Acute ischemic right MCA stroke (HCC)   • Aortic valve vegetation     Past Medical History:   Diagnosis Date   • Antiphospholipid syndrome (HCC)    • Deep vein thrombosis (HCC)      Past Surgical History:   Procedure Laterality Date   • FRACTURE SURGERY     • INTERVENTIONAL RADIOLOGY PROCEDURE Bilateral 2022    Procedure: CAROTID CEREBRAL ANGIOGRAM BILATERAL;  Surgeon: Juventino Headley MD;  Location: [facility] INVASIVE LOCATION;  Service: Interventional Radiology;  Laterality: Bilateral;   • LEG SURGERY Right     spiral fracture repair three years ago       General Information     Row Name 22 1306          Physical Therapy Time and Intention    Document Type therapy note (daily note)  -     Mode of Treatment physical therapy;individual therapy  -     Row Name 22 1306          General Information    Existing Precautions/Restrictions fall;other (see comments)  R MCA, L weakness (LUE>LLE), dysarthria  -     Barriers to Rehab medically complex  -     Row Name 22 1306          Cognition    Orientation Status (Cognition) oriented x 4  -     Row Name 22 1306          Safety Issues, Functional Mobility    Safety Issues Affecting Function (Mobility) awareness of need for  assistance;insight into deficits/self-awareness;safety precautions follow-through/compliance  -     Impairments Affecting Function (Mobility) balance;coordination;endurance/activity tolerance;grasp;motor control;postural/trunk control;sensation/sensory awareness;strength  -     Comment, Safety Issues/Impairments (Mobility) Alert and following commands  -           User Key  (r) = Recorded By, (t) = Taken By, (c) = Cosigned By    Initials Name Provider Type     Cesar Gonzalez, PT Physical Therapist               Mobility     Row Name 06/01/22 1308          Bed Mobility    Bed Mobility sit-supine  -     Supine-Sit [place] (Bed Mobility) contact guard  -     Sit-Supine [place] (Bed Mobility) contact guard  -     Assistive Device (Bed Mobility) bed rails;head of bed elevated  -     Comment, (Bed Mobility) Pt with decreased ability to use LUE for push off during supine to sit, pt utilizes momentum to achieve sitting EOB.  -     Row Name 06/01/22 1308          Sit-Stand Transfer    Sit-Stand [place] (Transfers) contact guard;1 person assist;verbal cues  -     Assistive Device (Sit-Stand Transfers) walker, front-wheeled  -     Comment, (Sit-Stand Transfer) Pt performed STS x3 from EOB, requires cues for proper hand placement and manual assist to position L hand on RW handle.  -     Row Name 06/01/22 1308          Gait/Stairs (Locomotion)    [place] Level (Gait) minimum assist (75% patient effort);1 person assist;verbal cues;nonverbal cues (demo/gesture)  -     Assistive Device (Gait) walker, front-wheeled  -     Distance in Feet (Gait) 130  -     Deviations/Abnormal Patterns (Gait) bilateral deviations;base of support, narrow;adrian decreased;stride length decreased;weight shifting decreased;left sided deviations;ataxic  -     Bilateral Gait Deviations forward flexed posture  -     Left Sided Gait Deviations forward flexed posture  -     [place] Level (Stairs)  not tested  -     Comment, (Gait/Stairs) Pt with difficulty maintaining L hand  on RW, may benefit from LUE platform walker next session. Pt demonstrated step through gait pattern with occasional scissoring d/t ataxia. No gross L knee buckling noted and no AFO worn this session d/t 4/5 L DF strength. Pt required physical assist positioning RW and cues for proper foot placement. Distance limited d/t fatigue.  -           User Key  (r) = Recorded By, (t) = Taken By, (c) = Cosigned By    Initials Name Provider Type    Cesar Dixon, PT Physical Therapist               Obj/Interventions     Row Name 06/01/22 1312          Motor Skills    Therapeutic Exercise hip;knee;ankle  -     Row Name 06/01/22 1312          Hip (Therapeutic Exercise)    Hip (Therapeutic Exercise) strengthening exercise  -     Hip Strengthening (Therapeutic Exercise) bilateral;mini squats;standing;10 repetitions  -     Row Name 06/01/22 1312          Ankle (Therapeutic Exercise)    Ankle (Therapeutic Exercise) strengthening exercise  -     Ankle Strengthening (Therapeutic Exercise) bilateral;plantarflexion;standing;10 repetitions  -     Row Name 06/01/22 1312          Balance    Balance Assessment sitting static balance;sitting dynamic balance;standing static balance;standing dynamic balance  -     Static Sitting Balance supervision  -     Dynamic Sitting Balance standby assist  -     Position, Sitting Balance unsupported;sitting edge of bed  -     Static Standing Balance contact guard  -     Dynamic Standing Balance minimal assist  -     Position/Device Used, Standing Balance supported;walker, front-wheeled  -     Comment, Balance Pt required occasional bouts of [place] during ambulation with RW d/t instability during bouts of LLE incoordination.  -           User Key  (r) = Recorded By, (t) = Taken By, (c) = Cosigned By    Initials Name Provider Type    Cesar Dixon, PT Physical Therapist                Goals/Plan    No documentation.                Clinical Impression     [facility] Name 06/01/22 1314          Pain    Pretreatment Pain Rating 0/10 - no pain  -     Posttreatment Pain Rating 0/10 - no pain  -[facility] Name 06/01/22 1314          Plan of Care Review    Plan of Care Reviewed With patient  -     Progress improving  -     Outcome Evaluation Pt demontrated improved functional mobility this session, progressing ambulation distance to 130ft with RW and occasional bouts of MinAx1, pt with difficulty maintaining L hand  on RW, may benefit from LUE platform walker next session. Pt performed bed mobility with CGA and transfers with CGA and RW. Pt performed OOB mobility without AFO this session d/t 4/5 L DF strength, no foot drop noted during ambulation and no L knee buckling noted. PT plans to continue progressing PT POC as tolerated, continuing to recommend IPR at [place].  -[facility] Name 06/01/22 1314          Vital Signs    Pre Systolic BP Rehab 107  -     Pre Treatment Diastolic BP 78  -     Pretreatment Heart Rate (beats/min) 81  -     Pre SpO2 (%) 98  -     O2 Delivery Pre Treatment room air  -     O2 Delivery Intra Treatment room air  -     O2 Delivery Post Treatment room air  -     Pre Patient Position Supine  -     Intra Patient Position Standing  -     Post Patient Position Supine  -LH     Row Name 06/01/22 1314          Positioning and Restraints    Pre-Treatment Position in bed  -     Post Treatment Position bed  -     In Bed notified nsg;supine;call light within reach;encouraged to call for assist  -           User Key  (r) = Recorded By, (t) = Taken By, (c) = Cosigned By    Initials Name Provider Type     Cesar Gonzalez, PT Physical Therapist               Outcome Measures     Row Name 06/01/22 1322 06/01/22 0805       How much help from another person do you currently need...    Turning from your back to your side while in flat bed without using bedrails? 3  - 3   -LC    Moving from lying on back to sitting on the side of a flat bed without bedrails? 3  - 3  -LC    Moving to and from a bed to a chair (including a wheelchair)? 3  - 3  -LC    Standing up from a chair using your arms (e.g., wheelchair, bedside chair)? 3  - 3  -LC    Climbing 3-5 steps with a railing? 2  - 2  -LC    To walk in hospital room? 3  - 3  -LC    AM-PAC 6 Clicks Score (PT) 17  - 17  -    Highest level of mobility 5 --> Static standing  - 5 --> Static standing  -          User Key  (r) = Recorded By, (t) = Taken By, (c) = Cosigned By    Initials Name Provider Type    Virginia Bishop RN Registered Nurse     Cesar Gonzalez, PT Physical Therapist                             Physical Therapy Education                 Title: PT OT SLP Therapies (In Progress)     Topic: Physical Therapy (Done)     Point: Mobility training (Done)     Learning Progress Summary           Patient Acceptance, E, VU by  at 6/1/2022 1322    Comment: Reviewed safety with mobility, PT POC, DC recommendations, and discontinuation of L AFO use    Eager, E,D,H, NR by DM at 5/31/2022 1609    Acceptance, E,D, VU,DU by  at 5/29/2022 0947    Acceptance, E,D, VU,NR by  at 5/26/2022 1511    Acceptance, E,D, NR,VU by  at 5/25/2022 1328   Family Acceptance, E,D, VU,DU by  at 5/29/2022 0947    Acceptance, E,D, NR,VU by  at 5/25/2022 1328                   Point: Home exercise program (Done)     Learning Progress Summary           Patient Acceptance, E, VU by  at 6/1/2022 1322    Comment: Reviewed safety with mobility, PT POC, DC recommendations, and discontinuation of L AFO use    Eager, E,D,H, NR by DM at 5/31/2022 1609    Acceptance, E,D, VU,DU by  at 5/29/2022 0947    Acceptance, E,D, VU,NR by  at 5/26/2022 1511   Family Acceptance, E,D, VU,DU by  at 5/29/2022 0947                   Point: Body mechanics (Done)     Learning Progress Summary           Patient Acceptance, E, VU by  at 6/1/2022 7812     Comment: Reviewed safety with mobility, PT POC, DC recommendations, and discontinuation of L AFO use    Eager, E,D,H, NR by DM at 5/31/2022 1609    Acceptance, E,D, VU,DU by  at 5/29/2022 0947    Acceptance, E,D, VU,NR by HP at 5/26/2022 1511    Acceptance, E,D, NR,VU by HP at 5/25/2022 1328   Family Acceptance, E,D, VU,DU by  at 5/29/2022 0947    Acceptance, E,D, NR,VU by HP at 5/25/2022 1328                   Point: Precautions (Done)     Learning Progress Summary           Patient Acceptance, E, VU by  at 6/1/2022 1322    Comment: Reviewed safety with mobility, PT POC, DC recommendations, and discontinuation of L AFO use    Eager, E,D,H, NR by DM at 5/31/2022 1609    Acceptance, E,D, VU,DU by  at 5/29/2022 0947    Acceptance, E,D, VU,NR by HP at 5/26/2022 1511    Acceptance, E,D, NR,VU by HP at 5/25/2022 1328   Family Acceptance, E,D, VU,DU by  at 5/29/2022 0947    Acceptance, E,D, NR,VU by  at 5/25/2022 1328                               User Key     Initials Effective Dates Name Provider Type Discipline    DM 06/16/21 -  Ashley Quintana, PT Physical Therapist PT     06/16/21 -  Selina Whitlock, PT Physical Therapist PT     09/21/21 -  Cesar Gonzalez, PT Physical Therapist PT     06/01/21 -  Sammi Schaffer PT Physical Therapist PT              PT Recommendation and Plan     Plan of Care Reviewed With: patient  Progress: improving  Outcome Evaluation: Pt demontrated improved functional mobility this session, progressing ambulation distance to 130ft with RW and occasional bouts of MinAx1, pt with difficulty maintaining L hand  on RW, may benefit from LUE platform walker next session. Pt performed bed mobility with CGA and transfers with CGA and RW. Pt performed OOB mobility without AFO this session d/t 4/5 L DF strength, no foot drop noted during ambulation and no L knee buckling noted. PT plans to continue progressing PT POC as tolerated, continuing to recommend IPR at [place].     Time  Calculation:    PT Charges     Row Name 06/01/22 1323             Time Calculation    Start Time 1139  -      PT Received On 06/01/22  -      PT Goal Re-Cert Due Date 06/04/22  -              Timed Charges    90105 - PT Therapeutic Exercise Minutes 10  -      75584 - Gait Training Minutes  8  -      58955 - PT Therapeutic Activity Minutes 10  -              Total Minutes    Timed Charges Total Minutes 28  -       Total Minutes 28  -            User Key  (r) = Recorded By, (t) = Taken By, (c) = Cosigned By    Initials Name Provider Type     Cesar Gonzalez, PT Physical Therapist              Therapy Charges for Today     Code Description Service Date Service Provider Modifiers Qty    54732492683 HC PT THER PROC EA 15 MIN 6/1/2022 Cesar Gonzalez, PT GP 1    50557693654 HC PT THERAPEUTIC ACT EA 15 MIN 6/1/2022 Cesar Gonzalez, PT GP 1          PT G-Codes  Outcome Measure Options: AM-PAC 6 Clicks Basic Mobility (PT)  AM-PAC 6 Clicks Score (PT): 17  AM-PAC 6 Clicks Score (OT): 14  Modified [place] Scale: 4 - Moderately severe disability.  Unable to walk without assistance, and unable to attend to own bodily needs without assistance.    Ceasr Gonzalez PT  6/1/2022

## 2022-06-01 NOTE — PROGRESS NOTES
"Pharmacy Consult  -  Warfarin    Vargas Reyna is a  27 y.o. male   Height - 182.9 cm (72\")  Weight - 76.2 kg (167 lb 15.9 oz)    Consulting Provider: - Oncology  Indication: - RLE DVT/ aortic valve thrombosis/ acute rt. Sided CVA/ antiphospholipid syndrome  Goal INR: - 2-3  Home Regimen:   - New start  Bridge Therapy: Yes, enoxaparin      Drug-Drug Interactions with current regimen:   Aspirin - increased risk of bleeding    Enoxaparin may result in an increased risk of bleeding   Fluoxetine may result in an increased risk of bleeding   PRN acetaminophen may result in an increased risk of bleeding                Marijuana may result in increased effect of warfarin (increased INR/ risk of bleeding)    Warfarin Dosing During Admission:    Date  5/30 5/31 6/1         INR  1.21 1.18  1.24         Dose   5 mg  5 mg  7.5 mg            Education Provided:  Warfarin education provided on 5/30 verbally and in writing.  Discussed effects of warfarin, importance of checking INR, drug-drug and drug-food interactions, and signs/symptoms of bleeding and clotting.  Patient verbalized understanding through teach back.  All pertinent questions were answered.       Discharge Follow up:    Following Provider -  [facility]   Follow up time range or appointment - soon after patient has finished acute rehab    Labs:    Results from last 7 days   Lab Units 06/01/22  0453 05/31/22  0530 05/30/22  1135 05/29/22  0430 05/28/22  0431 05/27/22  0504 05/26/22  0746   INR  1.24* 1.18* 1.21*  --   --   --   --    HEMOGLOBIN g/dL  --  13.5  --  14.1 13.7 14.1 13.3   HEMATOCRIT %  --  38.8  --  41.4 40.8 43.5 40.8     Results from last 7 days   Lab Units 05/29/22  0430 05/28/22  0431 05/27/22  0504 05/26/22  0746   SODIUM mmol/L 128*  --  131* 135*   POTASSIUM mmol/L 3.9 3.8 4.0 3.8   CHLORIDE mmol/L 92*  --  96* 98   CO2 mmol/L 22.0  --  21.0* 22.0   BUN mg/dL 16  --  15 12   CREATININE mg/dL 1.00  --  0.90 0.85   CALCIUM mg/dL " 9.3  --  9.6 9.0   BILIRUBIN mg/dL 0.7  --   --   --    ALK PHOS U/L 65  --   --   --    ALT (SGPT) U/L 22  --   --   --    AST (SGOT) U/L 20  --   --   --    GLUCOSE mg/dL 92  --  88 94     Current dietary intake: 25 % dinner on 5/30, otherwise, no documentation noted  Diet Order   Procedures    Diet Regular     Assessment/Plan:     Pharmacy consulted to dose warfarin (new start) for antiphospholipid syndrome with hx DVTs and new CVA. Initiated on 5/30/22.  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  Repeat ECHO in 4 weeks.    Patient's INR is sub therapeutic today at 1.24.  (Day 3 of therapy)  Recommend to increase to warfarin 7.5 mg tonight.  Therapeutic Lovenox bridge patient until INR therapeutic. Goal INR 2-3.   Referral to [facility] warfarin clinic has been placed.   Daily PT/INR ordered.  Pharmacy will monitor signs/symptoms of bleeding, dietary intake, and drug-drug interactions and make dose adjustments as minor Schmitz, PharmD  6/1/2022  14:00 EDT

## 2022-06-01 NOTE — PLAN OF CARE
Goal Outcome Evaluation:   VSS. Sinus rhythm to sinus tach. Room air. NIH 7. Patient A/ox4. No complaints of pain. Patient denies further needs at this time.

## 2022-06-01 NOTE — PAYOR COMM NOTE
"Ref # [id number]  Fariha Burton RN, BSN, CM[facility]  Phone # [phone]  Fax # [phone]  Vargas Roberts (27 y.o. Male)             Date of Birth   1995    Social Security Number       Address   [address] [place], [place] [address]    Home Phone   [phone]    MRN   [id number]       Sabianist   Ad[employer]ist    Marital Status   Single                            Admission Date   5/24/22    Admission Type   Urgent    Admitting Provider   Lilia Steele DO    Attending Provider   Lilia Steele DO    Department, Room/Bed   [address], S313/1       Discharge Date       Discharge Disposition       Discharge Destination                               Attending Provider: Lilia Steele DO    Allergies: No Known Allergies    Isolation: None   Infection: None   Code Status: CPR   Advance Care Planning Activity    Ht: 182.9 cm (72\")   Wt: 76.2 kg (167 lb 15.9 oz)    Admission Cmt: None   Principal Problem: Acute ischemic right MCA stroke (HCC) [I63.511]                 Active Insurance as of 5/24/2022     Primary Coverage     Payor Plan Insurance Group Employer/Plan Group    [facility] BY ASTRID [id number]     Payor Plan Address Payor Plan Phone Number Payor Plan Fax Number Effective Dates    [address]   1/1/2021 - None Entered    [place] [id number]       Subscriber Name Subscriber Birth Date Member ID       VARGAS ROBERTS 1995 [id number]                     Current Facility-Administered Medications   Medication Dose Route Frequency Provider Last Rate Last Admin   • acetaminophen (TYLENOL) tablet 650 mg  650 mg Oral Q6H PRN Yamilka Weber APRN   650 mg at 05/24/22 0831   • aspirin chewable tablet 81 mg  81 mg Oral Daily Petra Cordova APRN   81 mg at 05/31/22 0818    Or   • aspirin suppository 300 mg  300 mg Rectal Daily Petra Cordova APRN       • atorvastatin (LIPITOR) tablet 80 mg  " 80 mg Oral Nightly Petra Cordova APRN   80 mg at 05/31/22 2008   • cyclobenzaprine (FLEXERIL) tablet 5 mg  5 mg Oral TID PRN Emerita Siegel APRN   5 mg at 05/28/22 1438   • Enoxaparin Sodium (LOVENOX) syringe 80 mg  1 mg/kg Subcutaneous Q12H Ashley Blanchard MD   80 mg at 06/01/22 0555   • FLUoxetine (PROzac) capsule 10 mg  10 mg Oral Daily Emerita Siegel APRN   10 mg at 05/31/22 0818   • Magnesium Sulfate 2 gram Bolus, followed by 8 gram infusion (total Mg dose 10 grams)- Mg less than or equal to 1mg/dL  2 g Intravenous PRN Rene Mitchell MD        Or   • Magnesium Sulfate 2 gram / 50mL Infusion (GIVE X 3 BAGS TO EQUAL 6GM TOTAL DOSE) - Mg 1.1 - 1.5 mg/dl  2 g Intravenous PRN Rene Mitchell MD        Or   • Magnesium Sulfate 4 gram infusion- Mg 1.6-1.9 mg/dL  4 g Intravenous PRN Rene Mitchell MD 25 mL/hr at 05/29/22 0830 4 g at 05/29/22 0830   • metoprolol tartrate (LOPRESSOR) injection 5 mg  5 mg Intravenous Q6H PRN Napoleon Navarro MD   5 mg at 05/31/22 1720   • metoprolol tartrate (LOPRESSOR) tablet 25 mg  25 mg Oral Q12H Napoleon Navarro MD   25 mg at 05/31/22 1649   • ondansetron (ZOFRAN) injection 4 mg  4 mg Intravenous Q6H PRN Yamilka Weber APRN   4 mg at 05/31/22 1720   • Pharmacy to dose warfarin   Does not apply Continuous PRN Barry Clark MD       • potassium chloride (KLOR-CON) packet 40 mEq  40 mEq Oral PRN Juventino Headley MD       • potassium chloride (MICRO-K) CR capsule 40 mEq  40 mEq Oral PRN Juventino Headley MD       • potassium chloride 10 mEq in 100 mL IVPB  10 mEq Intravenous Q1H PRN Nicole Jarrett PharmD 100 mL/hr at 05/25/22 1044 10 mEq at 05/25/22 1044   • sodium chloride 0.9 % flush 10 mL  10 mL Intravenous Q12H Petra Cordova APRN   10 mL at 05/31/22 2006   • sodium chloride 0.9 % flush 10 mL  10 mL Intravenous PRN Petra Cordova APRN       • warfarin (COUMADIN) tablet 5 mg  5 mg Oral Daily Eduardo  Barry CAMPOS MD   5 mg at 05/31/22 1657     Lab Results (last 48 hours)     Procedure Component Value Units Date/Time    Protime-INR [[id number]]  (Abnormal) Collected: 06/01/22 0453    Specimen: Blood Updated: 06/01/22 0537     Protime 15.5 Seconds      INR 1.24    Protime-INR [[id number]]  (Abnormal) Collected: 05/31/22 0530    Specimen: Blood Updated: 05/31/22 0718     Protime 15.0 Seconds      INR 1.18    CBC & Differential [[id number]]  (Abnormal) Collected: 05/31/22 0530    Specimen: Blood Updated: 05/31/22 0637    Narrative:      The following orders were created for panel order CBC & Differential.  Procedure                               Abnormality         Status                     ---------                               -----------         ------                     CBC Auto Differential[[id number]]        Abnormal            Final result               Scan Slide[[id number]]                                                                    Please view results for these tests on the individual orders.    CBC Auto Differential [[id number]]  (Abnormal) Collected: 05/31/22 0530    Specimen: Blood Updated: 05/31/22 0636     WBC 9.05 10*3/mm3      RBC 4.76 10*6/mm3      Hemoglobin 13.5 g/dL      Hematocrit 38.8 %      MCV 81.5 fL      MCH 28.4 pg      MCHC 34.8 g/dL      RDW 12.0 %      RDW-SD 35.7 fl      MPV 10.4 fL      Platelets 103 10*3/mm3      Neutrophil % 65.0 %      Lymphocyte % 21.0 %      Monocyte % 10.3 %      Eosinophil % 2.5 %      Basophil % 0.9 %      Immature Grans % 0.3 %      Neutrophils, Absolute 5.88 10*3/mm3      Lymphocytes, Absolute 1.90 10*3/mm3      Monocytes, Absolute 0.93 10*3/mm3      Eosinophils, Absolute 0.23 10*3/mm3      Basophils, Absolute 0.08 10*3/mm3      Immature Grans, Absolute 0.03 10*3/mm3      nRBC 0.0 /100 WBC     Protime-INR [[id number]]  (Abnormal) Collected: 05/30/22 1135    Specimen: Blood Updated: 05/30/22 1221     Protime 15.2 Seconds      INR 1.21    Blood Culture -  "Blood, Arm, Right [[id number]]  (Normal) Collected: 05/24/22 2300    Specimen: Blood from Arm, Right Updated: 05/30/22 0847     Blood Culture No growth at 5 days          Imaging Results (Last 48 Hours)     ** No results found for the last 48 hours. **        Operative/Procedure Notes (last 48 hours)  Notes from 05/30/22 0720 through 06/01/22 0720   No notes of this type exist for this encounter.          Physician Progress Notes (last 48 hours)      Napoleon Navarro MD at 05/31/22 1715          [facility] Progress Note     LOS: 7 days   Patient Care Team:  Alysha Noe APRN as PCP - General (Nurse Practitioner)  PCP:  Alysha Noe APRN    Chief Complaint: Follow-up on embolic stroke, SVT    Subjective: Patient noted some improvement in left arm strength.  When I came to evaluate him today noted his heart rate was maintaining between 130 and 150 bpm.  He stated he was in asymptomatic but later he complained of nausea noted this happened to him once while he was still in ICU.  When I asked him to bear down he can transition into sinus rhythm.  He did not respond much to carotid massage.      Review of Systems:   All systems have been reviewed and are negative with the exception of those mentioned above.      Objective:    Vital Sign Min/Max for last 24 hours  Temp  Min: 98 °F (36.7 °C)  Max: 98.8 °F (37.1 °C)   BP  Min: 124/70  Max: 151/100   Pulse  Min: 82  Max: 130   Resp  Min: 16  Max: 16   SpO2  Min: 91 %  Max: 97 %   No data recorded   No data recorded     Flowsheet Rows    Flowsheet Row First Filed Value   Admission Height 182.9 cm (72\") Documented at 05/24/2022 0745   Admission Weight 83.5 kg (184 lb) Documented at 05/24/2022 0745          Telemetry: Sinus rhythm earlier today and now junctional tachycardia    EKG:  bpm, suspected junctional tachycardia.      Intake/Output Summary (Last 24 hours) at 5/31/2022 1715  Last data filed at " 5/31/2022 0400  Gross per 24 hour   Intake 465 ml   Output 550 ml   Net -85 ml     Intake & Output (last 3 days)       05/28 0701 05/29 0700 05/29 0701 05/30 0700 05/30 0701 05/31 0700 05/31 0701 06/01 0700    P.O.  225 945     Total Intake(mL/kg)  225 (3) 945 (12.4)     Urine (mL/kg/hr) 600 (0.3)  900 (0.5)     Stool 0  0     Total Output 600  900     Net -600 +225 +45             Urine Unmeasured Occurrence 3 x 2 x 1 x     Stool Unmeasured Occurrence 4 x 2 x 1 x            Physical Exam:  Constitutional:       Appearance: Not in distress.   Cardiovascular:      Tachycardia present. Regular rhythm.   Pulses:     Intact distal pulses.   Edema:     Peripheral edema absent.   Musculoskeletal:      Cervical back: Normal range of motion and neck supple. Neurological:      Comments: Persistent left-sided weakness.          LABS/DIAGNOSTIC DATA:  Results from last 7 days   Lab Units 05/31/22  0530 05/29/22 0430 05/28/22  0431   WBC 10*3/mm3 9.05 7.81 7.73   HEMOGLOBIN g/dL 13.5 14.1 13.7   HEMATOCRIT % 38.8 41.4 40.8   PLATELETS 10*3/mm3 103* 88* 86*     Lab Results   Lab Value Date/Time    TROPONINT <0.010 05/24/2022 0321    TROPONINT 0.024 04/18/2022 1528    TROPONINT <0.010 12/17/2021 2127     Results from last 7 days   Lab Units 05/31/22  0530 05/30/22  1135   INR  1.18* 1.21*     Results from last 7 days   Lab Units 05/29/22  0430 05/28/22  0431 05/27/22  0504 05/26/22  0746   SODIUM mmol/L 128*  --  131* 135*   POTASSIUM mmol/L 3.9 3.8 4.0 3.8   CHLORIDE mmol/L 92*  --  96* 98   CO2 mmol/L 22.0  --  21.0* 22.0   BUN mg/dL 16  --  15 12   CREATININE mg/dL 1.00  --  0.90 0.85   CALCIUM mg/dL 9.3  --  9.6 9.0   BILIRUBIN mg/dL 0.7  --   --   --    ALK PHOS U/L 65  --   --   --    ALT (SGPT) U/L 22  --   --   --    AST (SGOT) U/L 20  --   --   --    GLUCOSE mg/dL 92  --  88 94     Results from last 7 days   Lab Units 05/25/22 0435   HEMOGLOBIN A1C % 4.90     Results from last 7 days   Lab Units 05/25/22 0435    CHOLESTEROL mg/dL 133   TRIGLYCERIDES mg/dL 130   HDL CHOL mg/dL 32*   LDL CHOL mg/dL 78               Echo:  · There is a (13 mm) mobile mass on the aortic valve.  · Estimated left ventricular EF = 70% Left ventricular systolic function is normal.  · Left ventricular diastolic function was normal.  · Estimated right ventricular systolic pressure from tricuspid regurgitation is normal (<35 mmHg). Calculated right ventricular systolic pressure from tricuspid regurgitation is 24 mmHg.  · Saline test results are negative.     Venous duplex:  · Acute right lower extremity deep vein thrombosis noted in the common femoral, deep femoral, proximal femoral and mid femoral.  · Acute right lower extremity superficial thrombophlebitis noted in the saphenofemoral junction.           MRI brain:  Multifocal areas of acute infarct are present, including the large area  of acute infarct involving the right pre and postcentral regions  described on same-day CT. This infarct extends to involve the right  posterior insula. Additional scattered areas of smaller acute infarct  are present in the right ROSALINDA territory, bilateral temporal lobes, right  occipital lobe and left cerebellum. There is no evidence of significant  associated global mass effect or hemorrhage.       Medication Review:   aspirin, 81 mg, Oral, Daily   Or  aspirin, 300 mg, Rectal, Daily  atorvastatin, 80 mg, Oral, Nightly  enoxaparin, 1 mg/kg, Subcutaneous, Q12H  FLUoxetine, 10 mg, Oral, Daily  metoprolol tartrate, 25 mg, Oral, Q12H  sodium chloride, 10 mL, Intravenous, Q12H  warfarin, 5 mg, Oral, Daily       Pharmacy to dose warfarin,            Acute ischemic right MCA stroke (HCC)    Thrombocytopenia (HCC)    Acute deep vein thrombosis (DVT) of distal vein of both lower extremities (HCC)    Antiphospholipid syndrome (HCC)    Aortic valve vegetation      Assessment/Plan:  1.  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  -At this time would give a period of anticoagulation and then reassess for resolution of the aortic valve mass, have discussed this with the patient and family and will schedule return for [facility]I in 3 to 4 weeks to reevaluate  -CT surgery has evaluated and agree with plans for anticoagulation at this time    2.  SVT/possible junctional tachycardia  -Patient was noted this afternoon to have persistent tachycardia heart rates between 130 and 150 bpm  -With vagal maneuver he goes to sinus transiently but then resumes tachycardia.  -Ordered p.o. metoprolol but the patient had nausea and vomiting, will substitute a 5 mg IV dose  -Monitor response to metoprolol  -For sustained symptomatic tachycardia could give adenosine 6 mg if needed  -We will continue to follow    3.  Hyponatremia  -Per primary team      Napoleon Navarro MD [facility]  05/31/22      Electronically signed by Napoleon Navarro MD at 05/31/22 1723     Barry Clark MD at 05/31/22 1022          HEMATOLOGY/ONCOLOGY PROGRESS NOTE    Subjective      CC: Antiphospholipid syndrome    SUBJECTIVE:   No acute events overnight.  Patient resting comfortably in bed this morning.  Continues to have improved speech and movement in his left arm        Past Medical History, Past Surgical History, Social History, Family History have been reviewed and are without significant changes except as mentioned.      Medications:  The current medication list was reviewed in the EMR    ALLERGIES: No Known Allergies    ROS:  A comprehensive 10 point review of systems was performed and was negative except as mentioned.      Objective      Vitals:    05/31/22 0410 05/31/22 0615 05/31/22 0747 05/31/22 0900    BP:   125/85    BP Location:   Right arm    Patient Position:   Lying    Pulse: 85 85 86 89   Resp:   16    Temp:   98.4 °F (36.9 °C)    TempSrc:   Oral    SpO2: 96% 96%  95%   Weight:       Height:                General: well appearing, in no acute distress  HEENT: sclerae anicteric, oropharynx clear  Lymphatics: no cervical, supraclavicular, inguinal, or axillary adenopathy  Cardiovascular: regular rate and rhythm, no murmurs  Lungs: clear to auscultation bilaterally  Abdomen: soft, nontender, nondistended.  No palpable organomegaly  Extremities: no lower extremity edema  Skin: no rashes, lesions, bruising, or petechiae  Neuro: Alert and oriented x 3. Moves all extremities.    RECENT LABS:    Results from last 7 days   Lab Units 05/31/22  0530 05/29/22  0430 05/28/22  0431   WBC 10*3/mm3 9.05 7.81 7.73   HEMOGLOBIN g/dL 13.5 14.1 13.7   PLATELETS 10*3/mm3 103* 88* 86*     Results from last 7 days   Lab Units 05/29/22  0430 05/28/22  0431 05/27/22  0504 05/26/22  0746   SODIUM mmol/L 128*  --  131* 135*   POTASSIUM mmol/L 3.9 3.8 4.0 3.8   CO2 mmol/L 22.0  --  21.0* 22.0   BUN mg/dL 16  --  15 12   CREATININE mg/dL 1.00  --  0.90 0.85   GLUCOSE mg/dL 92  --  88 94     Results from last 7 days   Lab Units 05/29/22  0430   AST (SGOT) U/L 20   ALT (SGPT) U/L 22   BILIRUBIN mg/dL 0.7   ALK PHOS U/L 65         CT Head Without Contrast    Result Date: 5/27/2022  1. Overall no change from prior exam with evolving right MCA distribution infarct  and separate small evolving infarct involving the right frontal lobe. Additional scattered areas of acute ischemia better assessed on recent MRI. 2. Small area of sulcal hyperdensity abutting right posterior parietal lobe is unchanged which may relate to a tiny area of petechial hemorrhage. No significant space-occupying intraparenchymal hemorrhage.  This report was finalized on 5/27/2022 8:13 AM by Wing Echevarria MD.      CT Head Without Contrast    Result Date: 5/26/2022  A tiny  focus of sulcal hyperdensity is present posterior to the right MCA infarct, questionably representing a tiny area of petechial hemorrhage. Otherwise unchanged appearance of evolving right MCA territory infarct without evidence of avel hemorrhagic conversion or worsening mass effect.  This report was finalized on 5/26/2022 8:44 PM by Ja Bernard.      CT Head Without Contrast    Result Date: 5/25/2022  1.  Evolving changes of right MCA territory infarct. 2.  The scattered areas of restricted diffusion noted on MRI of the brain are not well-defined on the CT and could reflect sequela to embolic type phenomenon.  This report was finalized on 5/25/2022 8:07 AM by Eben Morillo MD.      CT Head Without Contrast    Result Date: 5/24/2022  Unchanged evolving right MCA territory infarct without evidence of hemorrhage or increased mass effect.  This report was finalized on 5/24/2022 8:10 PM by Ja Bernard.      CT Head Without Contrast    Result Date: 5/24/2022  Evolving areas of acute infarct are present in the right pre and postcentral gyri as above, without evidence of significant global mass effect or hemorrhage.  This report was finalized on 5/24/2022 3:44 PM by Ja Bernard.      CT Angiogram Neck    Result Date: 5/24/2022  Impression: 1. Negative CTA neck. Authenticated by Debbie Salinas MD on 05/24/2022 05:14:27 AM    MRI Brain Without Contrast    Result Date: 5/24/2022  Multifocal areas of acute infarct are present, including the large area of acute infarct involving the right pre and postcentral regions described on same-day CT. This infarct extends to involve the right posterior insula. Additional scattered areas of smaller acute infarct are present in the right ROSALINDA territory, bilateral temporal lobes, right occipital lobe and left cerebellum. There is no evidence of significant associated global mass effect or hemorrhage.  This report was finalized on 5/24/2022 10:02 PM by Ja Bernard.      CT  Angiogram Head    Addendum Date: 5/24/2022    ADDENDUM REPORT ADDENDUM: This report was discussed with Onel Haddad on May 24, 2022 05:14:00 EDT. Authenticated by Debbie Salinas MD on 05/24/2022 05:14:06 AM    Result Date: 5/24/2022  Impression: 1. Occlusion of a long branch of M2 segment of right middle cerebral artery. 2. Questionable significant stenosis/occlusion distal aspect of short branch of M2 segment of left middle cerebral artery. Authenticated by Debbie Salinas MD on 05/24/2022 05:12:11 AM    CT Head Without Contrast Stroke Protocol    Addendum Date: 5/24/2022    ADDENDUM REPORT ADDENDUM: This report was discussed with Dr. Onel Haddad on May 24, 2022 04:41:00 EDT. Authenticated by Debbie Salinas MD on 05/24/2022 04:41:28 AM    Result Date: 5/24/2022  Impression: 1. No acute intracranial CT abnormality evident. See above. Authenticated by Debibe Salinas MD on 05/24/2022 04:38:24 AM    CT CEREBRAL PERFUSION WITH & WITHOUT CONTRAST    Addendum Date: 5/24/2022    ADDENDUM REPORT ADDENDUM: This report was discussed with Dr. Onel Haddad on May 24, 2022 04:41:00 EDT. Authenticated by Debbie Salinas MD on 05/24/2022 04:41:45 AM    Result Date: 5/24/2022  Impression: 1. Findings suggest 28 mL mismatch volume penumbra (salvageable ischemic brain tissue) in right parietal and left frontal lobes with underlying ischemic core (infarct) in right parietal lobe. MRI may be confirmatory. Authenticated by Debbie Salinas MD on 05/24/2022 04:38:10 AM    Invasive peripheral vascular study    Result Date: 5/24/2022   TICI 1 thrombectomy of a right M3 cortical vessel.  No complications noted.          Assessment   ASSESSMENT & PLAN:  Antiphospholipid syndrome  -Diagnosed as an outpatient and followed by me  -Status post multiple thrombotic events including DVTs, renal arterial stenosis and now CVA  -Per my last outpatient note, we discussed transitioning to warfarin vs continuing Xarelto 15 mg twice daily.   Patient elected to continue Xarelto 15 mg twice daily as he was uncertain whether he could be compliant with the dietary and lab restrictions of warfarin  -Per patient, he was taking Xarelto 20 mg daily at home prior to the event  -Discussed his case with neurosurgery on 2022 and have resumed heparin with plans to transition to warfarin once he is more stable  -Right lower extremity duplex notable for DVT and superficial thrombophlebitis.   -ECHO concerning for aortic valve vegetation.  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  -Continue Lovenox.  Started warfarin on 2022.  Goal oal INR of 2-3  -Dosing to be managed by pharmacy and anticoagulation clinic as an outpatient  -We will plan for repeat ECHO with cardiology in 4 weeks     Acute right-sided CVA  -Likely secondary to thrombus on his aortic valve from his antiphospholipid syndrome  -Neurology and neurosurgery following  -Continue Lovenox bridge and warfarin with a goal of INR 2-3  -Continue PT/OT  -Patient to go to [facility] on discharge     Aortic valve thrombosis  -Secondary to antiphospholipid syndrome  -Continue Lovenox.  Starting warfarin today  -CT surgery and cardiology consulted with no plans for surgical intervention and repeat ECHO in 4 weeks     Right lower extremity DVT  -Noted on ultrasound  -Continue Lovenox.    Continue warfarin as above     Thank you for the consult.  Please do not hesitate to contact with any questions or concerns.  We will continue to follow while inpatient    Barry Clark MD  Hematology and Oncology    2022  10:22 EDT                          Electronically signed by Barry Clark MD at 22 1024     Radha Appiah DO at 22 0930              [facility] Medicine Services  PROGRESS NOTE    Patient Name: Vargas Reyna  : 1995  MRN:  [id number]    Date of Admission: 5/24/2022  Primary Care Physician: Alysha Noe APRN    Subjective   Subjective     CC:  F/u Right MCA stroke     HPI:  Pt is without complaints. He continues to have weakness specifically in left hand.  Family is bedside.     ROS:    Gen- No fevers, chills  CV- No chest pain, palpitations  Resp- No cough, dyspnea  GI- No N/V/D, abd pain           Objective   Objective     Vital Signs:   Temp:  [98 °F (36.7 °C)-98.4 °F (36.9 °C)] 98.4 °F (36.9 °C)  Heart Rate:  [] 86  Resp:  [16-18] 16  BP: (124-149)/(70-98) 125/85     Physical Exam:  Constitutional: No acute distress, awake, alert, male sitting up in bed   HENT: NCAT, mucous membranes moist  Respiratory: Clear to auscultation bilaterally, respiratory effort normal   Cardiovascular: RRR, no murmurs, rubs, or gallops  Gastrointestinal: Positive bowel sounds, soft, nontender, nondistended  Musculoskeletal: No bilateral ankle edema  Psychiatric: Appropriate affect, cooperative  Neurologic: Oriented x 3, left sided weakness, speech clear   Skin: No rashes      Results Reviewed:  LAB RESULTS:      Lab 05/31/22  0530 05/30/22  1135 05/29/22  0430 05/28/22  0431 05/27/22  0504 05/26/22  2207 05/26/22  1332 05/26/22  0746 05/26/22  0030 05/25/22  1802 05/25/22  1016 05/25/22  0435 05/24/22  2300 05/24/22  1628   WBC 9.05  --  7.81 7.73 6.28  --   --  7.34  --   --   --  8.73   < >  --    HEMOGLOBIN 13.5  --  14.1 13.7 14.1  --   --  13.3  --   --   --  12.9*   < >  --    HEMATOCRIT 38.8  --  41.4 40.8 43.5  --   --  40.8  --   --   --  39.4   < >  --    PLATELETS 103*  --  88* 86* 79*  --   --  78*  --   --   --  71*   < >  --    NEUTROS ABS 5.88  --  5.34 4.99  --   --   --   --   --   --   --  5.55  --   --    IMMATURE GRANS (ABS) 0.03  --  0.02 0.03  --   --   --   --   --   --   --  0.03  --   --    LYMPHS ABS 1.90  --  1.38 1.63  --   --   --   --   --   --   --  2.16  --   --    MONOS ABS 0.93*  --  0.86 0.85  --   --   --    --   --   --   --  0.82  --   --    EOS ABS 0.23  --  0.17 0.17  --   --   --   --   --   --   --  0.11  --   --    MCV 81.5  --  83.3 85.5 88.1  --   --  85.7  --   --   --  85.7   < >  --    PROTIME 15.0* 15.2*  --   --   --   --   --   --   --   --   --   --   --   --    APTT  --   --   --   --   --   --   --   --   --   --   --   --   --  75.1   HEPARIN ANTI-XA  --   --   --   --   --  0.62 0.18* 0.24* 0.14* 0.12*   < >  --    < > 0.10*    < > = values in this interval not displayed.         Lab 05/29/22 0430 05/28/22  0431 05/27/22  0504 05/26/22  0746 05/26/22  0030 05/25/22  1700 05/25/22 0435   SODIUM 128*  --  131* 135*  --   --  132*   POTASSIUM 3.9 3.8 4.0 3.8 3.7   < > 3.4*   CHLORIDE 92*  --  96* 98  --   --  100   CO2 22.0  --  21.0* 22.0  --   --  22.0   ANION GAP 14.0  --  14.0 15.0  --   --  10.0   BUN 16  --  15 12  --   --  11   CREATININE 1.00  --  0.90 0.85  --   --  0.86   EGFR 107.1  --  121.6 123.7  --   --  123.2   GLUCOSE 92  --  88 94  --   --  104*   CALCIUM 9.3  --  9.6 9.0  --   --  8.9   MAGNESIUM 1.9 2.3 2.0 2.1  --   --  1.8   PHOSPHORUS 4.8*  --  3.9  --   --   --  3.9   HEMOGLOBIN A1C  --   --   --   --   --   --  4.90    < > = values in this interval not displayed.         Lab 05/29/22 0430   TOTAL PROTEIN 8.4   ALBUMIN 4.10   GLOBULIN 4.3   ALT (SGPT) 22   AST (SGOT) 20   BILIRUBIN 0.7   ALK PHOS 65         Lab 05/31/22  0530 05/30/22  1135   PROTIME 15.0* 15.2*   INR 1.18* 1.21*         Lab 05/25/22  0435   CHOLESTEROL 133   LDL CHOL 78   HDL CHOL 32*   TRIGLYCERIDES 130             Brief Urine Lab Results  (Last result in the past 365 days)      Color   Clarity   Blood   Leuk Est   Nitrite   Protein   CREAT   Urine HCG        04/18/22 1529 Yellow   Clear   Negative   Negative   Negative   Trace                 Microbiology Results Abnormal     Procedure Component Value - Date/Time    Blood Culture - Blood, Arm, Right [[id number]]  (Normal) Collected: 05/24/22 2300    Lab  Status: Final result Specimen: Blood from Arm, Right Updated: 05/30/22 0847     Blood Culture No growth at 5 days    Blood Culture - Blood, Arm, Left [[id number]]  (Normal) Collected: 05/24/22 2300    Lab Status: Final result Specimen: Blood from Arm, Left Updated: 05/29/22 2332     Blood Culture No growth at 5 days    COVID PRE-OP / PRE-PROCEDURE SCREENING ORDER (NO ISOLATION) - Swab, Nasopharynx [[id number]]  (Normal) Collected: 05/24/22 1301    Lab Status: Final result Specimen: Swab from Nasopharynx Updated: 05/24/22 1323    Narrative:      The following orders were created for panel order COVID PRE-OP / PRE-PROCEDURE SCREENING ORDER (NO ISOLATION) - Swab, Nasopharynx.  Procedure                               Abnormality         Status                     ---------                               -----------         ------                     COVID-19 and FLU A/B PCR...[[id number]]  Normal              Final result                 Please view results for these tests on the individual orders.    COVID-19 and FLU A/B PCR - Swab, Nasopharynx [[id number]]  (Normal) Collected: 05/24/22 1301    Lab Status: Final result Specimen: Swab from Nasopharynx Updated: 05/24/22 1323     COVID19 Not Detected     Influenza A PCR Not Detected     Influenza B PCR Not Detected    Narrative:      Fact sheet for providers: https://www.fda.gov/media/301874/download    Fact sheet for patients: https://www.fda.gov/media/342583/download    Test performed by PCR.          No radiology results from the last 24 hrs    Results for orders placed during the hospital encounter of 05/24/22    Adult Transthoracic Echo Complete W/ Cont if Necessary Per Protocol (With Agitated Saline)    Interpretation Summary  · There is a (13 mm) mobile mass on the aortic valve.  · Estimated left ventricular EF = 70% Left ventricular systolic function is normal.  · Left ventricular diastolic function was normal.  · Estimated right ventricular systolic pressure from  "tricuspid regurgitation is normal (<35 mmHg). Calculated right ventricular systolic pressure from tricuspid regurgitation is 24 mmHg.  · Saline test results are negative.      I have reviewed the medications:  Scheduled Meds:aspirin, 81 mg, Oral, Daily   Or  aspirin, 300 mg, Rectal, Daily  atorvastatin, 80 mg, Oral, Nightly  enoxaparin, 1 mg/kg, Subcutaneous, Q12H  FLUoxetine, 10 mg, Oral, Daily  sodium chloride, 10 mL, Intravenous, Q12H  warfarin, 5 mg, Oral, Daily      Continuous Infusions:Pharmacy to dose warfarin,       PRN Meds:.•  acetaminophen  •  cyclobenzaprine  •  magnesium sulfate **OR** magnesium sulfate **OR** magnesium sulfate  •  ondansetron  •  Pharmacy to dose warfarin  •  potassium chloride  •  potassium chloride  •  potassium chloride  •  sodium chloride    Assessment & Plan   Assessment & Plan     Active Hospital Problems    Diagnosis  POA   • **Acute ischemic right MCA stroke (HCC) [I63.511]  Yes   • Aortic valve vegetation [I33.0]  Yes   • Antiphospholipid syndrome ([facility]) [D68.61]  Yes   • Acute deep vein thrombosis (DVT) of distal vein of both lower extremities ([facility]) [I82.4Z3]  Yes   • Thrombocytopenia ([facility]) [D69.6]  Yes      Resolved Hospital Problems   No resolved problems to display.        Brief Hospital Course to date:  Vargas Reyna is a 27 y.o. male \"who has a PMHX of BLE DVT's secondary to antiphospholipid syndrome on Xarelto after having failed Eliquis therapy.  He has had issus with bilateral KATRIN and thrombocytopenia that have been stable.     Patient developed LUE numbness/weakness and presented to [facility] ED where advanced imaging showed an occlusion in R MCA territory.  Unfortunately, his anticoagulant use precluded tPA so he was emergently transferred to [facility] for neurointervention.     Patient was taken directly to the cath lab per Dr. Headley who attempted thrombectomy of M3 thrombus but it was unsuccessful, although he was noted to have good collateral flow to the " "penumbra.  Post-procedure he was brought to Neuro ICU for ongoing management.    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  He was seen in consultation by cardiothoracic surgery who felt that if he was optimally anticoagulated there would be no role for surgery.  He will have follow-up VALERI in 3 to 4 weeks to reevaluate for the presence of the mass.\" Per intensivist note.   He was transferred to [facility] on 5/30 and care was assigned to hospitalist service.    .    Acute CVA   Antiphospholipid syndrome  DVT  Thrombocytopenia  Marantic Endocarditis   -lovenox bridge for coumadin  -INR is 1.18   -failed eliquis and xarelto  -LUE weakness persist     Aortic valve mass  -Cardiology following needs  repeat VALERI in 4 weeks to re-assess mass.   -He was evaluated by CT surgery, no surgical intervention at this time   -Blood cultures NG      Hyponatremia  -128 on 5/29  -labs in am      Depression  Continue fluoxetine- might be source of hyponatremia, will check Na trend   DVT prophylaxis:  Medical and mechanical DVT prophylaxis orders are present.       AM-PAC 6 Clicks Score (PT): 16 (05/30/22 0815)    Disposition: I expect the patient to be discharged TBD, needs rehab    CODE STATUS:   Code Status and Medical Interventions:   Ordered at: 05/24/22 1518     Level Of Support Discussed With:    Patient     Code Status (Patient has no pulse and is not breathing):    CPR (Attempt to Resuscitate)     Medical Interventions (Patient has pulse or is breathing):    Full Support       Radha Appiah DO  05/31/22                Electronically signed by Radha Appiah DO at 05/31/22 3189     Barry Clark MD at 05/30/22 1057          HEMATOLOGY/ONCOLOGY PROGRESS NOTE    Subjective      CC: Antiphospholipid syndrome    SUBJECTIVE:   No acute events overnight.  Speech continues to improve.  Able to move his " left arm slightly with some  strength.  Increased sensation in the left upper extremity as well        Past Medical History, Past Surgical History, Social History, Family History have been reviewed and are without significant changes except as mentioned.      Medications:  The current medication list was reviewed in the EMR    ALLERGIES: No Known Allergies    ROS:  A comprehensive 10 point review of systems was performed and was negative except as mentioned.      Objective      Vitals:    05/30/22 0415 05/30/22 0655 05/30/22 0733 05/30/22 1034   BP:   130/84 131/98   BP Location:   Right arm Right arm   Patient Position:   Lying Lying   Pulse: 89 89 119 98   Resp:   18 18   Temp:   99 °F (37.2 °C) 98.4 °F (36.9 °C)   TempSrc:   Oral Oral   SpO2: 94% 98% 98%    Weight:       Height:                General: well appearing, in no acute distress  HEENT: sclerae anicteric, oropharynx clear  Lymphatics: no cervical, supraclavicular, inguinal, or axillary adenopathy  Cardiovascular: regular rate and rhythm, no murmurs  Lungs: clear to auscultation bilaterally  Abdomen: soft, nontender, nondistended.  No palpable organomegaly  Extremities: no lower extremity edema  Skin: no rashes, lesions, bruising, or petechiae  Neuro: Alert and oriented x 3.  Can now move all extremities.    RECENT LABS:    Results from last 7 days   Lab Units 05/29/22  0430 05/28/22  0431 05/27/22  0504   WBC 10*3/mm3 7.81 7.73 6.28   HEMOGLOBIN g/dL 14.1 13.7 14.1   PLATELETS 10*3/mm3 88* 86* 79*     Results from last 7 days   Lab Units 05/29/22  0430 05/28/22  0431 05/27/22  0504 05/26/22  0746   SODIUM mmol/L 128*  --  131* 135*   POTASSIUM mmol/L 3.9 3.8 4.0 3.8   CO2 mmol/L 22.0  --  21.0* 22.0   BUN mg/dL 16  --  15 12   CREATININE mg/dL 1.00  --  0.90 0.85   GLUCOSE mg/dL 92  --  88 94     Results from last 7 days   Lab Units 05/29/22  0430 05/24/22  0321   AST (SGOT) U/L 20 16   ALT (SGPT) U/L 22 18   BILIRUBIN mg/dL 0.7 0.2   ALK PHOS U/L  65 59         CT Head Without Contrast    Result Date: 5/27/2022  1. Overall no change from prior exam with evolving right MCA distribution infarct  and separate small evolving infarct involving the right frontal lobe. Additional scattered areas of acute ischemia better assessed on recent MRI. 2. Small area of sulcal hyperdensity abutting right posterior parietal lobe is unchanged which may relate to a tiny area of petechial hemorrhage. No significant space-occupying intraparenchymal hemorrhage.  This report was finalized on 5/27/2022 8:13 AM by Wing Echevarria MD.      CT Head Without Contrast    Result Date: 5/26/2022  A tiny focus of sulcal hyperdensity is present posterior to the right MCA infarct, questionably representing a tiny area of petechial hemorrhage. Otherwise unchanged appearance of evolving right MCA territory infarct without evidence of avel hemorrhagic conversion or worsening mass effect.  This report was finalized on 5/26/2022 8:44 PM by Ja Bernard.      CT Head Without Contrast    Result Date: 5/25/2022  1.  Evolving changes of right MCA territory infarct. 2.  The scattered areas of restricted diffusion noted on MRI of the brain are not well-defined on the CT and could reflect sequela to embolic type phenomenon.  This report was finalized on 5/25/2022 8:07 AM by Eben Morillo MD.      CT Head Without Contrast    Result Date: 5/24/2022  Unchanged evolving right MCA territory infarct without evidence of hemorrhage or increased mass effect.  This report was finalized on 5/24/2022 8:10 PM by Ja Bernard.      CT Head Without Contrast    Result Date: 5/24/2022  Evolving areas of acute infarct are present in the right pre and postcentral gyri as above, without evidence of significant global mass effect or hemorrhage.  This report was finalized on 5/24/2022 3:44 PM by Ja Bernard.      CT Angiogram Neck    Result Date: 5/24/2022  Impression: 1. Negative CTA neck. Authenticated by Debbie  MD Brad on 05/24/2022 05:14:27 AM    MRI Brain Without Contrast    Result Date: 5/24/2022  Multifocal areas of acute infarct are present, including the large area of acute infarct involving the right pre and postcentral regions described on same-day CT. This infarct extends to involve the right posterior insula. Additional scattered areas of smaller acute infarct are present in the right ROSALINDA territory, bilateral temporal lobes, right occipital lobe and left cerebellum. There is no evidence of significant associated global mass effect or hemorrhage.  This report was finalized on 5/24/2022 10:02 PM by Ja Bernard.      CT Angiogram Head    Addendum Date: 5/24/2022    ADDENDUM REPORT ADDENDUM: This report was discussed with Onel Haddad on May 24, 2022 05:14:00 EDT. Authenticated by Debbie Salinas MD on 05/24/2022 05:14:06 AM    Result Date: 5/24/2022  Impression: 1. Occlusion of a long branch of M2 segment of right middle cerebral artery. 2. Questionable significant stenosis/occlusion distal aspect of short branch of M2 segment of left middle cerebral artery. Authenticated by Debbie Salinas MD on 05/24/2022 05:12:11 AM    CT Head Without Contrast Stroke Protocol    Addendum Date: 5/24/2022    ADDENDUM REPORT ADDENDUM: This report was discussed with Dr. Onel Haddad on May 24, 2022 04:41:00 EDT. Authenticated by Debbie Salinas MD on 05/24/2022 04:41:28 AM    Result Date: 5/24/2022  Impression: 1. No acute intracranial CT abnormality evident. See above. Authenticated by Debbie Salinas MD on 05/24/2022 04:38:24 AM    CT CEREBRAL PERFUSION WITH & WITHOUT CONTRAST    Addendum Date: 5/24/2022    ADDENDUM REPORT ADDENDUM: This report was discussed with Dr. Onel Haddad on May 24, 2022 04:41:00 EDT. Authenticated by Debbie Salinas MD on 05/24/2022 04:41:45 AM    Result Date: 5/24/2022  Impression: 1. Findings suggest 28 mL mismatch volume penumbra (salvageable ischemic brain tissue) in right parietal and  left frontal lobes with underlying ischemic core (infarct) in right parietal lobe. MRI may be confirmatory. Authenticated by Debbie Salinas MD on 05/24/2022 04:38:10 AM    Invasive peripheral vascular study    Result Date: 5/24/2022   TICI 1 thrombectomy of a right M3 cortical vessel.  No complications noted.          Assessment   ASSESSMENT & PLAN:  Antiphospholipid syndrome  -Diagnosed as an outpatient and followed by me  -Status post multiple thrombotic events including DVTs, renal arterial stenosis and now CVA  -Per my last outpatient note, we discussed transitioning to warfarin vs continuing Xarelto 15 mg twice daily.  Patient elected to continue Xarelto 15 mg twice daily as he was uncertain whether he could be compliant with the dietary and lab restrictions of warfarin  -Per patient, he was taking Xarelto 20 mg daily at home prior to the event  -Discussed his case with neurosurgery on 5/24/2022 and have resumed heparin with plans to transition to warfarin once he is more stable  -Right lower extremity duplex notable for DVT and superficial thrombophlebitis.   -ECHO concerning for aortic valve vegetation.  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  -Continue Lovenox.  Starting warfarin today with goal INR of 2-3  -We will plan for repeat ECHO with cardiology in 4 weeks     Acute right-sided CVA  -Likely secondary to thrombus on his aortic valve from his antiphospholipid syndrome  -Neurology and neurosurgery following  -Continue Lovenox.  Starting warfarin today  -Continue PT/OT     Aortic valve thrombosis  -Secondary to antiphospholipid syndrome  -Continue Lovenox.  Starting warfarin today  -CT surgery and cardiology consulted with no plans for surgical intervention and repeat ECHO in 4 weeks     Right lower extremity DVT  -Noted on ultrasound  -Continue Lovenox.  Starting warfarin today     Thank you for the consult.   Please do not hesitate to contact with any questions or concerns.  We will continue to follow while inpatient    Barry Clark MD  Hematology and Oncology    2022  10:57 EDT                          Electronically signed by Barry Clark MD at 22 1058     Radha Appiah DO at 22 0932              [facility] Medicine Services  PROGRESS NOTE    Patient Name: Vargas Reyna  : 1995  MRN: [id number]    Date of Admission: 2022  Primary Care Physician: Alysha Noe APRN    Subjective   Subjective     CC:  F/u Right MCA stroke     HPI:  Pt without pain. He states that his left arm is still weak and immobile. He is currently without complaints. Eager to go to rehab    ROS:  Gen- No fevers, chills  CV- No chest pain, palpitations  Resp- No cough, dyspnea  GI- No N/V/D, abd pain         Objective   Objective     Vital Signs:   Temp:  [98.2 °F (36.8 °C)-99 °F (37.2 °C)] 99 °F (37.2 °C)  Heart Rate:  [] 119  Resp:  [16-18] 18  BP: (105-143)/(51-97) 130/84     Physical Exam:  Constitutional: No acute distress, awake, alert  HENT: NCAT, mucous membranes moist  Respiratory: Clear to auscultation bilaterally, respiratory effort normal   Cardiovascular: RRR, no murmurs, rubs, or gallops  Gastrointestinal: Positive bowel sounds, soft, nontender, nondistended  Musculoskeletal: No bilateral ankle edema  Psychiatric: Appropriate affect, cooperative  Neurologic: Oriented x 3, +LUE paralysis speech clear  Skin: No rashes      Results Reviewed:  LAB RESULTS:      Lab 22  0430 22  0431 22  0504 22  2207 22  1332 22  0746 22  0030 22  1802 22  1016 22  0435 22  2300 22  1628 22  0321   WBC 7.81 7.73 6.28  --   --  7.34  --   --   --  8.73  --   --  9.48   HEMOGLOBIN 14.1 13.7 14.1  --   --  13.3  --   --   --  12.9*  --   --  13.1   HEMATOCRIT 41.4 40.8 43.5  --   --  40.8   --   --   --  39.4  --   --  39.4   PLATELETS 88* 86* 79*  --   --  78*  --   --   --  71*  --   --  67*   NEUTROS ABS 5.34 4.99  --   --   --   --   --   --   --  5.55  --   --  6.97   IMMATURE GRANS (ABS) 0.02 0.03  --   --   --   --   --   --   --  0.03  --   --  0.02   LYMPHS ABS 1.38 1.63  --   --   --   --   --   --   --  2.16  --   --  1.60   MONOS ABS 0.86 0.85  --   --   --   --   --   --   --  0.82  --   --  0.62   EOS ABS 0.17 0.17  --   --   --   --   --   --   --  0.11  --   --  0.19   MCV 83.3 85.5 88.1  --   --  85.7  --   --   --  85.7  --   --  87.4   PROTIME  --   --   --   --   --   --   --   --   --   --   --   --  15.3*   APTT  --   --   --   --   --   --   --   --   --   --   --  75.1 99.9   HEPARIN ANTI-XA  --   --   --  0.62 0.18* 0.24* 0.14* 0.12*   < >  --    < > 0.10*  --     < > = values in this interval not displayed.         Lab 05/29/22  0430 05/28/22  0431 05/27/22  0504 05/26/22  0746 05/26/22  0030 05/25/22  1700 05/25/22  0435 05/24/22  1628 05/24/22  0321   SODIUM 128*  --  131* 135*  --   --  132*  --  137   POTASSIUM 3.9 3.8 4.0 3.8 3.7   < > 3.4*   < > 3.5   CHLORIDE 92*  --  96* 98  --   --  100  --  101   CO2 22.0  --  21.0* 22.0  --   --  22.0  --  24.0   ANION GAP 14.0  --  14.0 15.0  --   --  10.0  --  12.0   BUN 16  --  15 12  --   --  11  --  17   CREATININE 1.00  --  0.90 0.85  --   --  0.86  --  0.89   EGFR 107.1  --  121.6 123.7  --   --  123.2  --  122.0   GLUCOSE 92  --  88 94  --   --  104*  --  141*   CALCIUM 9.3  --  9.6 9.0  --   --  8.9  --  9.0   MAGNESIUM 1.9 2.3 2.0 2.1  --   --  1.8  --   --    PHOSPHORUS 4.8*  --  3.9  --   --   --  3.9  --   --    HEMOGLOBIN A1C  --   --   --   --   --   --  4.90  --   --     < > = values in this interval not displayed.         Lab 05/29/22  0430 05/24/22  0321   TOTAL PROTEIN 8.4 7.5   ALBUMIN 4.10 4.30   GLOBULIN 4.3 3.2   ALT (SGPT) 22 18   AST (SGOT) 20 16   BILIRUBIN 0.7 0.2   ALK PHOS 65 59         Lab  05/24/22  0321   TROPONIN T <0.010   PROTIME 15.3*   INR 1.18*         Lab 05/25/22  0435   CHOLESTEROL 133   LDL CHOL 78   HDL CHOL 32*   TRIGLYCERIDES 130             Brief Urine Lab Results  (Last result in the past 365 days)      Color   Clarity   Blood   Leuk Est   Nitrite   Protein   CREAT   Urine HCG        04/18/22 1529 Yellow   Clear   Negative   Negative   Negative   Trace                 Microbiology Results Abnormal     Procedure Component Value - Date/Time    Blood Culture - Blood, Arm, Right [[id number]]  (Normal) Collected: 05/24/22 2300    Lab Status: Final result Specimen: Blood from Arm, Right Updated: 05/30/22 0847     Blood Culture No growth at 5 days    Blood Culture - Blood, Arm, Left [[id number]]  (Normal) Collected: 05/24/22 2300    Lab Status: Final result Specimen: Blood from Arm, Left Updated: 05/29/22 2332     Blood Culture No growth at 5 days    COVID PRE-OP / PRE-PROCEDURE SCREENING ORDER (NO ISOLATION) - Swab, Nasopharynx [[id number]]  (Normal) Collected: 05/24/22 1301    Lab Status: Final result Specimen: Swab from Nasopharynx Updated: 05/24/22 1323    Narrative:      The following orders were created for panel order COVID PRE-OP / PRE-PROCEDURE SCREENING ORDER (NO ISOLATION) - Swab, Nasopharynx.  Procedure                               Abnormality         Status                     ---------                               -----------         ------                     COVID-19 and FLU A/B PCR...[[id number]]  Normal              Final result                 Please view results for these tests on the individual orders.    COVID-19 and FLU A/B PCR - Swab, Nasopharynx [[id number]]  (Normal) Collected: 05/24/22 1301    Lab Status: Final result Specimen: Swab from Nasopharynx Updated: 05/24/22 1323     COVID19 Not Detected     Influenza A PCR Not Detected     Influenza B PCR Not Detected    Narrative:      Fact sheet for providers: https://www.fda.gov/media/703076/download    Fact sheet for  "patients: https://www.[employer].gov/media/076741/download    Test performed by PCR.          No radiology results from the last 24 hrs    Results for orders placed during the hospital encounter of 05/24/22    Adult Transthoracic Echo Complete W/ Cont if Necessary Per Protocol (With Agitated Saline)    Interpretation Summary  · There is a (13 mm) mobile mass on the aortic valve.  · Estimated left ventricular EF = 70% Left ventricular systolic function is normal.  · Left ventricular diastolic function was normal.  · Estimated right ventricular systolic pressure from tricuspid regurgitation is normal (<35 mmHg). Calculated right ventricular systolic pressure from tricuspid regurgitation is 24 mmHg.  · Saline test results are negative.      I have reviewed the medications:  Scheduled Meds:aspirin, 81 mg, Oral, Daily   Or  aspirin, 300 mg, Rectal, Daily  atorvastatin, 80 mg, Oral, Nightly  enoxaparin, 1 mg/kg, Subcutaneous, Q12H  FLUoxetine, 10 mg, Oral, Daily  sodium chloride, 10 mL, Intravenous, Q12H      Continuous Infusions:   PRN Meds:.•  acetaminophen  •  cyclobenzaprine  •  magnesium sulfate **OR** magnesium sulfate **OR** magnesium sulfate  •  ondansetron  •  potassium chloride  •  potassium chloride  •  potassium chloride  •  sodium chloride    Assessment & Plan   Assessment & Plan     Active Hospital Problems    Diagnosis  POA   • **Acute ischemic right MCA stroke (HCC) [I63.511]  Yes   • Aortic valve vegetation [I33.0]  Yes   • Antiphospholipid syndrome (HCC) [D68.61]  Yes   • Acute deep vein thrombosis (DVT) of distal vein of both lower extremities (HCC) [I82.4Z3]  Yes   • Thrombocytopenia (HCC) [D69.6]  Yes      Resolved Hospital Problems   No resolved problems to display.        Brief Hospital Course to date:  Vargas Reyna is a 27 y.o. male \"who has a PMHX of BLE DVT's secondary to antiphospholipid syndrome on Xarelto after having failed Eliquis therapy.  He has had issus with bilateral KATRIN and " "thrombocytopenia that have been stable.     Patient developed LUE numbness/weakness and presented to [facility] ED where advanced imaging showed an occlusion in R MCA territory.  Unfortunately, his anticoagulant use precluded tPA so he was emergently transferred to [facility] for neurointervention.     Patient was taken directly to the cath lab per Dr. Headley who attempted thrombectomy of M3 thrombus but it was unsuccessful, although he was noted to have good collateral flow to the penumbra.  Post-procedure he was brought to Neuro ICU for ongoing management.    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  He was seen in consultation by cardiothoracic surgery who felt that if he was optimally anticoagulated there would be no role for surgery.  He will have follow-up VALERI in 3 to 4 weeks to reevaluate for the presence of the mass.\" Per intensivist note.   He was transferred to [facility] on 5/30 and care was assigned to hospitalist service.    .    Acute CVA   Antiphospholipid syndrome  DVT  Thrombocytopenia  Marantic Endocarditis   -lovenox bridge for coumadin  -failed eliquis and xarelto  -LUE weakness persist     Aortic valve mass  -Cardiology following needs  repeat VALERI in 4 weeks to re-assess mass.   -He was evaluated by CT surgery, no surgical intervention at this time   -Blood cultures NG at 5 days      Hyponatremia  -128 on 5/29  -check in am      Depression  Continue fluoxetine- might be source of hyponatremia, will check Na in am and see if it is trending up or down     DVT prophylaxis:  Medical and mechanical DVT prophylaxis orders are present.       AM-PAC 6 Clicks Score (PT): 17 (05/29/22 5410)    Disposition: I expect the patient to be discharged TBD, needs rehab?    CODE STATUS:   Code Status and Medical Interventions:   Ordered at: 05/24/22 9530     Level Of Support Discussed With:    Patient     Code Status " (Patient has no pulse and is not breathing):    CPR (Attempt to Resuscitate)     Medical Interventions (Patient has pulse or is breathing):    Full Support       Radha Appiah DO  05/30/22                Electronically signed by Radha Appiah DO at 05/31/22 0930       Consult Notes (last 48 hours)  Notes from 05/30/22 0720 through 06/01/22 0720   No notes of this type exist for this encounter.

## 2022-06-01 NOTE — PLAN OF CARE
Goal Outcome Evaluation:  Plan of Care Reviewed With: patient        Progress: improving  Outcome Evaluation: Pt demontrated improved functional mobility this session, progressing ambulation distance to 130ft with RW and occasional bouts of MinAx1, pt with difficulty maintaining L hand  on RW, may benefit from LUE platform walker next session. Pt performed bed mobility with CGA and transfers with CGA and RW. Pt performed OOB mobility without AFO this session d/t 4/5 L DF strength, no foot drop noted during ambulation and no L knee buckling noted. PT plans to continue progressing PT POC as tolerated, continuing to recommend IPR at VT.

## 2022-06-01 NOTE — PLAN OF CARE
Problem: Adult Inpatient Plan of Care  Goal: Plan of Care Review  Outcome: Ongoing, Progressing  Flowsheets (Taken 6/1/2022 1448)  Progress: no change  Plan of Care Reviewed With: patient  Goal: Patient-Specific Goal (Individualized)  Outcome: Ongoing, Progressing  Goal: Absence of Hospital-Acquired Illness or Injury  Outcome: Ongoing, Progressing  Intervention: Identify and Manage Fall Risk  Recent Flowsheet Documentation  Taken 6/1/2022 1400 by Virginia Linder RN  Safety Promotion/Fall Prevention:   activity supervised   assistive device/personal items within reach   clutter free environment maintained   fall prevention program maintained   nonskid shoes/slippers when out of bed   room organization consistent   safety round/check completed   mobility aid in reach  Taken 6/1/2022 1200 by Virginia Linder RN  Safety Promotion/Fall Prevention:   activity supervised   assistive device/personal items within reach   clutter free environment maintained   nonskid shoes/slippers when out of bed   room organization consistent   safety round/check completed   mobility aid in reach  Taken 6/1/2022 1000 by Virginia Linder RN  Safety Promotion/Fall Prevention:   activity supervised   assistive device/personal items within reach   clutter free environment maintained   nonskid shoes/slippers when out of bed   room organization consistent   safety round/check completed  Taken 6/1/2022 0805 by Virginia Linder RN  Safety Promotion/Fall Prevention:   activity supervised   assistive device/personal items within reach   clutter free environment maintained   fall prevention program maintained   nonskid shoes/slippers when out of bed   room organization consistent   safety round/check completed  Intervention: Prevent Skin Injury  Recent Flowsheet Documentation  Taken 6/1/2022 1400 by Virginia Linder, RN  Body Position: position changed independently  Skin Protection:   adhesive use limited   protective footwear used   tubing/devices free from  skin contact  Taken 6/1/2022 1200 by Virginia Linder RN  Body Position: position changed independently  Skin Protection:   adhesive use limited   incontinence pads utilized   protective footwear used   tubing/devices free from skin contact  Taken 6/1/2022 1000 by Virginia Linder RN  Body Position:   neutral body alignment   neutral head position   position changed independently  Skin Protection:   adhesive use limited   protective footwear used   tubing/devices free from skin contact  Taken 6/1/2022 0805 by Virginia Linder RN  Body Position:   neutral body alignment   neutral head position  Skin Protection:   adhesive use limited   incontinence pads utilized   protective footwear used   tubing/devices free from skin contact  Intervention: Prevent and Manage VTE (Venous Thromboembolism) Risk  Recent Flowsheet Documentation  Taken 6/1/2022 1400 by Virginia Linder RN  Activity Management: activity adjusted per tolerance  VTE Prevention/Management: (SQ Lovenox and coumadin) bleeding risk factor(s) identified  Taken 6/1/2022 1200 by Virginia Linder RN  Activity Management: activity adjusted per tolerance  VTE Prevention/Management: (SQ Lovenox, warfarin) --  Taken 6/1/2022 1000 by Virginia Linder RN  Activity Management: activity adjusted per tolerance  Taken 6/1/2022 0805 by Virginia Linder RN  VTE Prevention/Management: (SQ lovenox and coumadin therapy) other (see comments)  Intervention: Prevent Infection  Recent Flowsheet Documentation  Taken 6/1/2022 1400 by Virginia Linder RN  Infection Prevention:   environmental surveillance performed   equipment surfaces disinfected   hand hygiene promoted   rest/sleep promoted   single patient room provided   visitors restricted/screened  Taken 6/1/2022 1200 by Virginia Linder RN  Infection Prevention:   equipment surfaces disinfected   environmental surveillance performed   hand hygiene promoted   rest/sleep promoted   single patient room provided   visitors restricted/screened  Taken  6/1/2022 1000 by Virginia Linder RN  Infection Prevention:   environmental surveillance performed   equipment surfaces disinfected   hand hygiene promoted   rest/sleep promoted   single patient room provided   visitors restricted/screened  Taken 6/1/2022 0805 by Virginia Linder RN  Infection Prevention:   environmental surveillance performed   equipment surfaces disinfected   hand hygiene promoted   rest/sleep promoted   visitors restricted/screened   single patient room provided  Goal: Optimal Comfort and Wellbeing  Outcome: Ongoing, Progressing  Intervention: Monitor Pain and Promote Comfort  Recent Flowsheet Documentation  Taken 6/1/2022 1400 by Virginia Linder RN  Pain Management Interventions: quiet environment facilitated  Taken 6/1/2022 1200 by Virginia Linder RN  Pain Management Interventions: quiet environment facilitated  Taken 6/1/2022 1000 by Virginia Linder RN  Pain Management Interventions: quiet environment facilitated  Taken 6/1/2022 0805 by Virginia Linder RN  Pain Management Interventions:   quiet environment facilitated   care clustered  Intervention: Provide Person-Centered Care  Recent Flowsheet Documentation  Taken 6/1/2022 1400 by Virginia Linder RN  Trust Relationship/Rapport:   care explained   choices provided   emotional support provided   empathic listening provided   questions answered   questions encouraged   reassurance provided   thoughts/feelings acknowledged  Taken 6/1/2022 1200 by Virginia Linder RN  Trust Relationship/Rapport:   care explained   choices provided   emotional support provided   empathic listening provided   questions answered   questions encouraged   reassurance provided   thoughts/feelings acknowledged  Taken 6/1/2022 1000 by Virginia Linder RN  Trust Relationship/Rapport:   care explained   choices provided   emotional support provided   empathic listening provided   questions answered   questions encouraged   reassurance provided   thoughts/feelings acknowledged  Goal:  Readiness for Transition of Care  Outcome: Ongoing, Progressing   Goal Outcome Evaluation:  Plan of Care Reviewed With: patient        Progress: no change

## 2022-06-01 NOTE — PROGRESS NOTES
[facility] Medicine Services  PROGRESS NOTE    Patient Name: Vargas Reyna  : 1995  MRN: [id number]    Date of Admission: 2022  Primary Care Physician: Alysha Noe APRN    Subjective   Subjective     CC:  CVA    HPI:  He will open his eyes or answer questions other than yes or no when trying to get history from him.  Reviewed, not cooperative with exam and questions    ROS:  Unable to obtain due to patient affect        Objective   Objective     Vital Signs:   Temp:  [98 °F (36.7 °C)-98.8 °F (37.1 °C)] 98.8 °F (37.1 °C)  Heart Rate:  [] 80  Resp:  [16-18] 16  BP: (107-151)/() 107/78     Physical Exam:  Constitutional: No acute distress, ignoring questions  HENT: NCAT, mucous membranes moist  Respiratory:Respiratory effort normal   Cardiovascular: RRR on telemetry  Gastrointestinal: Nondistended, limited exam due to patient cooperation  Musculoskeletal: No bilateral ankle edema  Psychiatric: Flat affect, not cooperative  Neurologic: Oriented x 3, speech clear  Skin: No rashes      Results Reviewed:  LAB RESULTS:      Lab 22  0453 22  0530 22  1135 22  0430 22  0431 22  0504 22  2207 22  1332 22  0746 22  0030 22  1802   WBC  --  9.05  --  7.81 7.73 6.28  --   --  7.34  --   --    HEMOGLOBIN  --  13.5  --  14.1 13.7 14.1  --   --  13.3  --   --    HEMATOCRIT  --  38.8  --  41.4 40.8 43.5  --   --  40.8  --   --    PLATELETS  --  103*  --  88* 86* 79*  --   --  78*  --   --    NEUTROS ABS  --  5.88  --  5.34 4.99  --   --   --   --   --   --    IMMATURE GRANS (ABS)  --  0.03  --  0.02 0.03  --   --   --   --   --   --    LYMPHS ABS  --  1.90  --  1.38 1.63  --   --   --   --   --   --    MONOS ABS  --  0.93*  --  0.86 0.85  --   --   --   --   --   --    EOS ABS  --  0.23  --  0.17 0.17  --   --   --   --   --   --    MCV  --  81.5  --  83.3 85.5 88.1  --   --  85.7  --   --    PROTIME  15.5* 15.0* 15.2*  --   --   --   --   --   --   --   --    HEPARIN ANTI-XA  --   --   --   --   --   --  0.62 0.18* 0.24* 0.14* 0.12*         Lab 05/29/22  0430 05/28/22  0431 05/27/22  0504 05/26/22  0746 05/26/22  0030   SODIUM 128*  --  131* 135*  --    POTASSIUM 3.9 3.8 4.0 3.8 3.7   CHLORIDE 92*  --  96* 98  --    CO2 22.0  --  21.0* 22.0  --    ANION GAP 14.0  --  14.0 15.0  --    BUN 16  --  15 12  --    CREATININE 1.00  --  0.90 0.85  --    EGFR 107.1  --  121.6 123.7  --    GLUCOSE 92  --  88 94  --    CALCIUM 9.3  --  9.6 9.0  --    MAGNESIUM 1.9 2.3 2.0 2.1  --    PHOSPHORUS 4.8*  --  3.9  --   --          Lab 05/29/22 0430   TOTAL PROTEIN 8.4   ALBUMIN 4.10   GLOBULIN 4.3   ALT (SGPT) 22   AST (SGOT) 20   BILIRUBIN 0.7   ALK PHOS 65         Lab 06/01/22  0453 05/31/22  0530 05/30/22  1135   PROTIME 15.5* 15.0* 15.2*   INR 1.24* 1.18* 1.21*                 Brief Urine Lab Results  (Last result in the past 365 days)      Color   Clarity   Blood   Leuk Est   Nitrite   Protein   CREAT   Urine HCG        04/18/22 1529 Yellow   Clear   Negative   Negative   Negative   Trace                 Microbiology Results Abnormal     Procedure Component Value - Date/Time    Blood Culture - Blood, Arm, Right [[id number]]  (Normal) Collected: 05/24/22 2300    Lab Status: Final result Specimen: Blood from Arm, Right Updated: 05/30/22 0847     Blood Culture No growth at 5 days    Blood Culture - Blood, Arm, Left [[id number]]  (Normal) Collected: 05/24/22 2300    Lab Status: Final result Specimen: Blood from Arm, Left Updated: 05/29/22 2332     Blood Culture No growth at 5 days    COVID PRE-OP / PRE-PROCEDURE SCREENING ORDER (NO ISOLATION) - Swab, Nasopharynx [[id number]]  (Normal) Collected: 05/24/22 1301    Lab Status: Final result Specimen: Swab from Nasopharynx Updated: 05/24/22 1323    Narrative:      The following orders were created for panel order COVID PRE-OP / PRE-PROCEDURE SCREENING ORDER (NO ISOLATION) - Swab,  Nasopharynx.  Procedure                               Abnormality         Status                     ---------                               -----------         ------                     COVID-19 and FLU A/B PCR...[[id number]]  Normal              Final result                 Please view results for these tests on the individual orders.    COVID-19 and FLU A/B PCR - Swab, Nasopharynx [[id number]]  (Normal) Collected: 05/24/22 1301    Lab Status: Final result Specimen: Swab from Nasopharynx Updated: 05/24/22 1323     COVID19 Not Detected     Influenza A PCR Not Detected     Influenza B PCR Not Detected    Narrative:      Fact sheet for providers: https://www.fda.gov/media/657971/download    Fact sheet for patients: https://www.fda.gov/media/050828/download    Test performed by PCR.          No radiology results from the last 24 hrs    Results for orders placed during the hospital encounter of 05/24/22    Adult Transthoracic Echo Complete W/ Cont if Necessary Per Protocol (With Agitated Saline)    Interpretation Summary  · There is a (13 mm) mobile mass on the aortic valve.  · Estimated left ventricular EF = 70% Left ventricular systolic function is normal.  · Left ventricular diastolic function was normal.  · Estimated right ventricular systolic pressure from tricuspid regurgitation is normal (<35 mmHg). Calculated right ventricular systolic pressure from tricuspid regurgitation is 24 mmHg.  · Saline test results are negative.      I have reviewed the medications:  Scheduled Meds:aspirin, 81 mg, Oral, Daily   Or  aspirin, 300 mg, Rectal, Daily  atorvastatin, 80 mg, Oral, Nightly  enoxaparin, 1 mg/kg, Subcutaneous, Q12H  FLUoxetine, 10 mg, Oral, Daily  metoprolol tartrate, 25 mg, Oral, Q12H  sodium chloride, 10 mL, Intravenous, Q12H  warfarin, 7.5 mg, Oral, Daily      Continuous Infusions:Pharmacy to dose warfarin,       PRN Meds:.•  acetaminophen  •  cyclobenzaprine  •  magnesium sulfate **OR** magnesium sulfate  "**OR** magnesium sulfate  •  metoprolol tartrate  •  ondansetron  •  Pharmacy to dose warfarin  •  potassium chloride  •  potassium chloride  •  potassium chloride  •  sodium chloride    Assessment & Plan   Assessment & Plan     Active Hospital Problems    Diagnosis  POA   • **Acute ischemic right MCA stroke (HCC) [I63.511]  Yes   • Aortic valve vegetation [I33.0]  Yes   • Antiphospholipid syndrome ([facility]) [D68.61]  Yes   • Acute deep vein thrombosis (DVT) of distal vein of both lower extremities ([facility]) [I82.4Z3]  Yes   • Thrombocytopenia ([facility]) [D69.6]  Yes      Resolved Hospital Problems   No resolved problems to display.        Brief Hospital Course to date:  Vargas Reyna is a 27 y.o. male  \"who has a PMHX of BLE DVT's secondary to antiphospholipid syndrome on Xarelto after having failed Eliquis therapy.  He has had issus with bilateral KATRIN and thrombocytopenia that have been stable.     Patient developed LUE numbness/weakness and presented to [facility] ED where advanced imaging showed an occlusion in R MCA territory.  Unfortunately, his anticoagulant use precluded tPA so he was emergently transferred to [facility] for neurointervention.     Patient was taken directly to the cath lab per Dr. Headley who attempted thrombectomy of M3 thrombus but it was unsuccessful, although he was noted to have good collateral flow to the penumbra.  Post-procedure he was brought to Neuro ICU for ongoing management.     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    He was seen in consultation by cardiothoracic surgery who felt that if he was optimally anticoagulated there would be no role for surgery.  He will have follow-up VALERI in 3 to 4 weeks to reevaluate for the presence of the mass.\" per intensivist note.   He was transferred to [facility] on 5/30 and care was assigned to hospitalist service.     This patient's problems and " plans were partially entered by my partner and updated as appropriate by me 06/01/22.       Acute CVA   Antiphospholipid syndrome    Right lower extremity DVT  Thrombocytopenia  Marantic Endocarditis   -lovenox bridge for coumadin  -INR is 1.18   -failed eliquis and xarelto  -Hematology following, recommend warfarin for anticoagulation with goal INR 2-3    Aortic valve mass  -Cardiology following needs  repeat VALERI in 4 weeks to re-assess mass.   -He was evaluated by CT surgery, no surgical intervention at this time   -Blood cultures NG      Hyponatremia  -128 on 5/29  -A.m. labs pending at 1:30 in the afternoon     Depression  Continue fluoxetine- might be source of hyponatremia, will check Na trend  once able to get labs drawn      DVT prophylaxis:  Medical and mechanical DVT prophylaxis orders are present.       AM-PAC 6 Clicks Score (PT): 17 (06/01/22 1322)    Disposition: I expect the patient to be discharged likely to rehab    CODE STATUS:   Code Status and Medical Interventions:   Ordered at: 05/24/22 3738     Level Of Support Discussed With:    Patient     Code Status (Patient has no pulse and is not breathing):    CPR (Attempt to Resuscitate)     Medical Interventions (Patient has pulse or is breathing):    Full Support       Lilia Steele, DO  06/01/22

## 2022-06-01 NOTE — PROGRESS NOTES
"[facility] Progress Note     LOS: 8 days   Patient Care Team:  Alysha Noe APRN as PCP - General (Nurse Practitioner)  PCP:  Alysha Noe APRN    Chief Complaint:  Follow-up on embolic stroke, SVT    Subjective: Patient sitting in bed.  No complaints of nausea today.  Left side strength gradually improving.  Rhythm has been sinus since early this a.m.      Review of Systems:   All systems have been reviewed and are negative with the exception of those mentioned above.      Objective:    Vital Sign Min/Max for last 24 hours  Temp  Min: 98 °F (36.7 °C)  Max: 98.8 °F (37.1 °C)   BP  Min: 107/78  Max: 151/100   Pulse  Min: 78  Max: 143   Resp  Min: 16  Max: 18   SpO2  Min: 93 %  Max: 97 %   No data recorded   No data recorded     Flowsheet Rows    Flowsheet Row First Filed Value   Admission Height 182.9 cm (72\") Documented at 05/24/2022 0745   Admission Weight 83.5 kg (184 lb) Documented at 05/24/2022 0745          Telemetry: Sinus rhythm    Intake/Output Summary (Last 24 hours) at 6/1/2022 1131  Last data filed at 5/31/2022 2000  Gross per 24 hour   Intake 225 ml   Output --   Net 225 ml     Intake & Output (last 3 days)       05/29 0701 05/30 0700 05/30 0701  05/31 0700 05/31 0701  06/01 0700 06/01 0701  06/02 0700    P.O. 225 945 225     Total Intake(mL/kg) 225 (3) 945 (12.4) 225 (3)     Urine (mL/kg/hr)  900 (0.5)      Stool  0      Total Output  900      Net +225 +45 +225             Urine Unmeasured Occurrence 2 x 1 x 1 x     Stool Unmeasured Occurrence 2 x 1 x 1 x            Physical Exam:    Constitutional:       Appearance: Not in distress.   Cardiovascular:     Regular rate and rhythm.  No murmur  Edema:     Peripheral edema absent.   . Neurological:      Comments: Persistent left-sided weakness.   However this is improving, left facial droop is improving.    LABS/DIAGNOSTIC DATA:  Results from last 7 days   Lab Units 05/31/22  0530 05/29/22  0430 " 05/28/22  0431   WBC 10*3/mm3 9.05 7.81 7.73   HEMOGLOBIN g/dL 13.5 14.1 13.7   HEMATOCRIT % 38.8 41.4 40.8   PLATELETS 10*3/mm3 103* 88* 86*     Lab Results   Lab Value Date/Time    TROPONINT <0.010 05/24/2022 0321    TROPONINT 0.024 04/18/2022 1528    TROPONINT <0.010 12/17/2021 2127     Results from last 7 days   Lab Units 06/01/22  0453 05/31/22  0530 05/30/22  1135   INR  1.24* 1.18* 1.21*     Results from last 7 days   Lab Units 05/29/22  0430 05/28/22  0431 05/27/22  0504 05/26/22  0746   SODIUM mmol/L 128*  --  131* 135*   POTASSIUM mmol/L 3.9 3.8 4.0 3.8   CHLORIDE mmol/L 92*  --  96* 98   CO2 mmol/L 22.0  --  21.0* 22.0   BUN mg/dL 16  --  15 12   CREATININE mg/dL 1.00  --  0.90 0.85   CALCIUM mg/dL 9.3  --  9.6 9.0   BILIRUBIN mg/dL 0.7  --   --   --    ALK PHOS U/L 65  --   --   --    ALT (SGPT) U/L 22  --   --   --    AST (SGOT) U/L 20  --   --   --    GLUCOSE mg/dL 92  --  88 94                       Medication Review:   aspirin, 81 mg, Oral, Daily   Or  aspirin, 300 mg, Rectal, Daily  atorvastatin, 80 mg, Oral, Nightly  enoxaparin, 1 mg/kg, Subcutaneous, Q12H  FLUoxetine, 10 mg, Oral, Daily  metoprolol tartrate, 25 mg, Oral, Q12H  sodium chloride, 10 mL, Intravenous, Q12H  warfarin, 7.5 mg, Oral, Daily       Pharmacy to dose warfarin,            Acute ischemic right MCA stroke (HCC)    Thrombocytopenia (HCC)    Acute deep vein thrombosis (DVT) of distal vein of both lower extremities (HCC)    Antiphospholipid syndrome (HCC)    Aortic valve vegetation      Assessment/Plan:  1.  Antiphospholipid syndrome/suspected marantic endocarditis/acute CVA/DVTs  -Echocardiogram demonstrates a 13 mm mobile mass in the aortic valve, suspected marantic endocarditis in the setting of known antiphospholipid syndrome  -Blood cultures sent, remaining negative  -Agree with Lovenox anticoagulation as a bridge to warfarin INR 1.24  -Agree with aspirin and statin.  -At this time would give a period of anticoagulation and  then reassess for resolution of the aortic valve mass, have discussed this with the patient and family and will schedule return for VALERI in 3 to 4 weeks to reevaluate  -CT surgery has evaluated and agree with plans for anticoagulation at this time     2.  SVT/possible junctional tachycardia  -Patient was noted 5/31 afternoon to have persistent tachycardia heart rates between 130 and 150 bpm  -With vagal maneuver he would return sinus transiently but then resumes tachycardia.  -Continue metoprolol 25 mg twice daily.  Patient now maintaining sinus rhythm.  -For any recurrent/sustained symptomatic SVT could give adenosine 6 mg if needed  -Currently appears improved     3.  Hyponatremia  -Per primary team    Will follow peripherally at this time.  Orders in place for follow-up VALERI as outpatient.  We will continue p.o. metoprolol.  Please call with any questions or concerns      Napoleon Navarro MD [facility]  06/01/22

## 2022-06-02 VITALS
DIASTOLIC BLOOD PRESSURE: 86 MMHG | SYSTOLIC BLOOD PRESSURE: 142 MMHG | TEMPERATURE: 98.6 F | HEIGHT: 72 IN | RESPIRATION RATE: 18 BRPM | OXYGEN SATURATION: 97 % | WEIGHT: 167.99 LBS | BODY MASS INDEX: 22.75 KG/M2 | HEART RATE: 87 BPM

## 2022-06-02 LAB
ALBUMIN SERPL-MCNC: 4.2 G/DL (ref 3.5–5.2)
ANION GAP SERPL CALCULATED.3IONS-SCNC: 9 MMOL/L (ref 5–15)
BASOPHILS # BLD AUTO: 0.06 10*3/MM3 (ref 0–0.2)
BASOPHILS NFR BLD AUTO: 0.8 % (ref 0–1.5)
BUN SERPL-MCNC: 17 MG/DL (ref 6–20)
BUN/CREAT SERPL: 18.1 (ref 7–25)
CALCIUM SPEC-SCNC: 9.4 MG/DL (ref 8.6–10.5)
CHLORIDE SERPL-SCNC: 98 MMOL/L (ref 98–107)
CO2 SERPL-SCNC: 26 MMOL/L (ref 22–29)
CREAT SERPL-MCNC: 0.94 MG/DL (ref 0.76–1.27)
DEPRECATED RDW RBC AUTO: 36.5 FL (ref 37–54)
EGFRCR SERPLBLD CKD-EPI 2021: 115.4 ML/MIN/1.73
EOSINOPHIL # BLD AUTO: 0.31 10*3/MM3 (ref 0–0.4)
EOSINOPHIL NFR BLD AUTO: 3.9 % (ref 0.3–6.2)
ERYTHROCYTE [DISTWIDTH] IN BLOOD BY AUTOMATED COUNT: 12 % (ref 12.3–15.4)
GLUCOSE SERPL-MCNC: 92 MG/DL (ref 65–99)
HCT VFR BLD AUTO: 39.2 % (ref 37.5–51)
HGB BLD-MCNC: 13.4 G/DL (ref 13–17.7)
IMM GRANULOCYTES # BLD AUTO: 0.03 10*3/MM3 (ref 0–0.05)
IMM GRANULOCYTES NFR BLD AUTO: 0.4 % (ref 0–0.5)
INR PPP: 1.47 (ref 0.84–1.13)
LYMPHOCYTES # BLD AUTO: 1.78 10*3/MM3 (ref 0.7–3.1)
LYMPHOCYTES NFR BLD AUTO: 22.3 % (ref 19.6–45.3)
MCH RBC QN AUTO: 28.2 PG (ref 26.6–33)
MCHC RBC AUTO-ENTMCNC: 34.2 G/DL (ref 31.5–35.7)
MCV RBC AUTO: 82.4 FL (ref 79–97)
MONOCYTES # BLD AUTO: 0.86 10*3/MM3 (ref 0.1–0.9)
MONOCYTES NFR BLD AUTO: 10.8 % (ref 5–12)
NEUTROPHILS NFR BLD AUTO: 4.95 10*3/MM3 (ref 1.7–7)
NEUTROPHILS NFR BLD AUTO: 61.8 % (ref 42.7–76)
NRBC BLD AUTO-RTO: 0 /100 WBC (ref 0–0.2)
PHOSPHATE SERPL-MCNC: 4.5 MG/DL (ref 2.5–4.5)
PLATELET # BLD AUTO: 117 10*3/MM3 (ref 140–450)
PMV BLD AUTO: 10.3 FL (ref 6–12)
POTASSIUM SERPL-SCNC: 3.7 MMOL/L (ref 3.5–5.2)
PROTHROMBIN TIME: 17.8 SECONDS (ref 11.4–14.4)
RBC # BLD AUTO: 4.76 10*6/MM3 (ref 4.14–5.8)
SODIUM SERPL-SCNC: 133 MMOL/L (ref 136–145)
WBC NRBC COR # BLD: 7.99 10*3/MM3 (ref 3.4–10.8)

## 2022-06-02 PROCEDURE — 25010000002 ENOXAPARIN PER 10 MG: Performed by: PSYCHIATRY & NEUROLOGY

## 2022-06-02 PROCEDURE — 85610 PROTHROMBIN TIME: CPT | Performed by: INTERNAL MEDICINE

## 2022-06-02 PROCEDURE — 80069 RENAL FUNCTION PANEL: CPT | Performed by: INTERNAL MEDICINE

## 2022-06-02 PROCEDURE — 85025 COMPLETE CBC W/AUTO DIFF WBC: CPT | Performed by: INTERNAL MEDICINE

## 2022-06-02 PROCEDURE — 99239 HOSP IP/OBS DSCHRG MGMT >30: CPT | Performed by: INTERNAL MEDICINE

## 2022-06-02 RX ORDER — WARFARIN SODIUM 2.5 MG/1
2.5 TABLET ORAL ONCE
Status: COMPLETED | OUTPATIENT
Start: 2022-06-02 | End: 2022-06-02

## 2022-06-02 RX ORDER — WARFARIN SODIUM 5 MG/1
5 TABLET ORAL NIGHTLY
Start: 2022-06-02 | End: 2022-06-22 | Stop reason: SDUPTHER

## 2022-06-02 RX ORDER — WARFARIN SODIUM 7.5 MG/1
TABLET ORAL
Status: CANCELLED
Start: 2022-06-02

## 2022-06-02 RX ORDER — ACETAMINOPHEN 325 MG/1
650 TABLET ORAL EVERY 6 HOURS PRN
Start: 2022-06-02 | End: 2022-06-22

## 2022-06-02 RX ORDER — ASPIRIN 81 MG/1
81 TABLET, CHEWABLE ORAL DAILY
Start: 2022-06-03 | End: 2022-07-12 | Stop reason: SDUPTHER

## 2022-06-02 RX ORDER — ENOXAPARIN SODIUM 100 MG/ML
1 INJECTION SUBCUTANEOUS EVERY 12 HOURS
Start: 2022-06-02 | End: 2022-06-22

## 2022-06-02 RX ORDER — WARFARIN SODIUM 7.5 MG/1
7.5 TABLET ORAL
Status: DISCONTINUED | OUTPATIENT
Start: 2022-06-02 | End: 2022-06-02 | Stop reason: HOSPADM

## 2022-06-02 RX ORDER — ATORVASTATIN CALCIUM 80 MG/1
80 TABLET, FILM COATED ORAL NIGHTLY
Qty: 90 TABLET
Start: 2022-06-02 | End: 2022-07-12 | Stop reason: SDUPTHER

## 2022-06-02 RX ORDER — FLUOXETINE 10 MG/1
10 CAPSULE ORAL DAILY
Start: 2022-06-03 | End: 2022-07-12 | Stop reason: SDUPTHER

## 2022-06-02 RX ADMIN — WARFARIN SODIUM 2.5 MG: 2.5 TABLET ORAL at 11:01

## 2022-06-02 RX ADMIN — METOPROLOL TARTRATE 25 MG: 25 TABLET, FILM COATED ORAL at 09:28

## 2022-06-02 RX ADMIN — ENOXAPARIN SODIUM 80 MG: 80 INJECTION SUBCUTANEOUS at 05:24

## 2022-06-02 RX ADMIN — FLUOXETINE 10 MG: 10 CAPSULE ORAL at 09:28

## 2022-06-02 RX ADMIN — ASPIRIN 81 MG 81 MG: 81 TABLET ORAL at 09:28

## 2022-06-02 RX ADMIN — Medication 10 ML: at 09:29

## 2022-06-02 NOTE — CASE MANAGEMENT/SOCIAL WORK
Case Management Discharge Note      Final Note: Pt has been approved to transfer to [facility] CVA unit today via [facility] at 1130. He will need to be in front of the 1700 building by 1115. Report can be called to [phone].         Selected Continued Care - Admitted Since 5/24/2022     Destination    No services have been selected for the patient.              Durable Medical Equipment    No services have been selected for the patient.              Dialysis/Infusion    No services have been selected for the patient.              Home Medical Care    No services have been selected for the patient.              Therapy    No services have been selected for the patient.              Community Resources    No services have been selected for the patient.              Community & DME    No services have been selected for the patient.                       Final Discharge Disposition Code: 62 - inpatient rehab facility

## 2022-06-02 NOTE — DISCHARGE SUMMARY
[facility] Medicine Services  DISCHARGE SUMMARY    Patient Name: Vargas Reyna  : 1995  MRN: [id number]    Date of Admission: 2022  7:41 AM  Date of Discharge:  2022  Primary Care Physician: Alysha Noe APRN    Consults     Date and Time Order Name Status Description    2022  4:16 PM Inpatient Cardiology Consult Completed     2022  7:48 AM Inpatient Cardiothoracic Surgery Consult Completed     2022 11:51 AM Inpatient Hematology & Oncology Consult Completed           Hospital Course     Presenting Problem:   Acute ischemic right MCA stroke (HCC) [I63.511]    Active Hospital Problems    Diagnosis  POA   • **Acute ischemic right MCA stroke (HCC) [I63.511]  Yes   • Aortic valve vegetation [I33.0]  Yes   • Antiphospholipid syndrome (HCC) [D68.61]  Yes   • Acute deep vein thrombosis (DVT) of distal vein of both lower extremities (HCC) [I82.4Z3]  Yes   • Thrombocytopenia (HCC) [D69.6]  Yes      Resolved Hospital Problems   No resolved problems to display.          Hospital Course:  Vargas Reyna is a 27 y.o. male with history of antiphospholipid syndrome on Xarelto after having failed Eliquis therapy.  He has had issues with bilateral KATRIN and thrombocytopenia that have been stable. Unsure if he has been compliant with anticoagulation, he is now on 3rd med (coumadin)     Patient developed LUE numbness/weakness and presented to [facility] ED where advanced imaging showed an occlusion in R MCA territory.  Unfortunately, his anticoagulant use precluded tPA so he was emergently transferred to [facility] for neurointervention.     Patient was taken directly to the cath lab per Dr. Headley who attempted thrombectomy of M3 thrombus but it was unsuccessful, although he was noted to have good collateral flow to the penumbra.  Post-procedure he was brought to Neuro ICU for ongoing management.     Echocardiogram on 2022 revealed a 13 mm mobile mass on the  "aortic valve suspect to be a marantic endocarditis and he was started on a heparin drip.    He was seen in consultation by cardiothoracic surgery who felt that if he was optimally anticoagulated there would be no role for surgery.  He will have follow-up VALERI in 3 to 4 weeks to reevaluate for the presence of the mass\" per intensivist note.   He was transferred to [facility] on 5/30 and care was assigned to hospitalist service.     This patient's problems and plans were partially entered by my partner and updated as appropriate by me 06/02/22.        Acute CVA   Antiphospholipid syndrome  Right lower extremity DVT  Thrombocytopenia  Marantic Endocarditis   -lovenox bridge for coumadin, trend INR  -?failed eliquis and xarelto  -Hematology evaluated, recommend warfarin for anticoagulation with goal INR 2-3    Received extra 2.5 mg of warfarin this morning.  Continue 5 mg in the afternoon with daily INR until goal.  Continue lovenox bridge until therapeutic     Aortic valve mass  -Cardiology following needs repeat VALERI in 4 weeks to re-assess mass (end of June)  -He was evaluated by CT surgery, no surgical intervention at this time   -Blood cultures NG      Hyponatremia  - improved, monitor periodically     Depression  Continue fluoxetine       Discharge Follow Up Recommendations for outpatient labs/diagnostics:  PCP in 1 week after DC from rehab  Cardiology in 3-4 weeks  Hematology as scheduled    Day of Discharge     HPI:   Doing ok, sleeping - awakens and falls back to sleep    Review of Systems  Limited ROS due to patient affect    Vital Signs:   Temp:  [98.4 °F (36.9 °C)-98.8 °F (37.1 °C)] 98.6 °F (37 °C)  Heart Rate:  [] 87  Resp:  [16-18] 18  BP: (107-143)/(78-97) 142/86      Physical Exam:  Constitutional: No acute distress, awakens to voice  HENT: NCAT, mucous membranes moist  Respiratory: no cough, on room air, respiratory effort normal   Cardiovascular: RRR, no murmurs  Gastrointestinal: Positive bowel sounds, " soft, nontender, nondistended  Musculoskeletal: No bilateral ankle edema  Psychiatric: flat affect, cooperative  Neurologic: Oriented x 3, speech clear  Skin: No rashes      Pertinent  and/or Most Recent Results     LAB RESULTS:      Lab 06/02/22 0523 06/01/22 0453 05/31/22 0530 05/30/22  1135 05/29/22 0430 05/28/22 0431 05/27/22  0504 05/26/22  2207 05/26/22  1332   WBC 7.99  --  9.05  --  7.81 7.73 6.28  --   --    HEMOGLOBIN 13.4  --  13.5  --  14.1 13.7 14.1  --   --    HEMATOCRIT 39.2  --  38.8  --  41.4 40.8 43.5  --   --    PLATELETS 117*  --  103*  --  88* 86* 79*  --   --    NEUTROS ABS 4.95  --  5.88  --  5.34 4.99  --   --   --    IMMATURE GRANS (ABS) 0.03  --  0.03  --  0.02 0.03  --   --   --    LYMPHS ABS 1.78  --  1.90  --  1.38 1.63  --   --   --    MONOS ABS 0.86  --  0.93*  --  0.86 0.85  --   --   --    EOS ABS 0.31  --  0.23  --  0.17 0.17  --   --   --    MCV 82.4  --  81.5  --  83.3 85.5 88.1  --   --    PROTIME 17.8* 15.5* 15.0* 15.2*  --   --   --   --   --    HEPARIN ANTI-XA  --   --   --   --   --   --   --  0.62 0.18*         Lab 06/02/22 0523 05/29/22 0430 05/28/22  0431 05/27/22  0504   SODIUM 133* 128*  --  131*   POTASSIUM 3.7 3.9 3.8 4.0   CHLORIDE 98 92*  --  96*   CO2 26.0 22.0  --  21.0*   ANION GAP 9.0 14.0  --  14.0   BUN 17 16  --  15   CREATININE 0.94 1.00  --  0.90   EGFR 115.4 107.1  --  121.6   GLUCOSE 92 92  --  88   CALCIUM 9.4 9.3  --  9.6   MAGNESIUM  --  1.9 2.3 2.0   PHOSPHORUS 4.5 4.8*  --  3.9         Lab 06/02/22  0523 05/29/22  0430   TOTAL PROTEIN  --  8.4   ALBUMIN 4.20 4.10   GLOBULIN  --  4.3   ALT (SGPT)  --  22   AST (SGOT)  --  20   BILIRUBIN  --  0.7   ALK PHOS  --  65         Lab 06/02/22  0523 06/01/22  0453 05/31/22  0530 05/30/22  1135   PROTIME 17.8* 15.5* 15.0* 15.2*   INR 1.47* 1.24* 1.18* 1.21*                 Brief Urine Lab Results  (Last result in the past 365 days)      Color   Clarity   Blood   Leuk Est   Nitrite   Protein   CREAT    Urine HCG        04/18/22 1529 Yellow   Clear   Negative   Negative   Negative   Trace               Microbiology Results (last 10 days)     Procedure Component Value - Date/Time    Blood Culture - Blood, Arm, Right [[id number]]  (Normal) Collected: 05/24/22 2300    Lab Status: Final result Specimen: Blood from Arm, Right Updated: 05/30/22 0847     Blood Culture No growth at 5 days    Blood Culture - Blood, Arm, Left [[id number]]  (Normal) Collected: 05/24/22 2300    Lab Status: Final result Specimen: Blood from Arm, Left Updated: 05/29/22 2332     Blood Culture No growth at 5 days    COVID PRE-OP / PRE-PROCEDURE SCREENING ORDER (NO ISOLATION) - Swab, Nasopharynx [[id number]]  (Normal) Collected: 05/24/22 1301    Lab Status: Final result Specimen: Swab from Nasopharynx Updated: 05/24/22 1323    Narrative:      The following orders were created for panel order COVID PRE-OP / PRE-PROCEDURE SCREENING ORDER (NO ISOLATION) - Swab, Nasopharynx.  Procedure                               Abnormality         Status                     ---------                               -----------         ------                     COVID-19 and FLU A/B PCR...[[id number]]  Normal              Final result                 Please view results for these tests on the individual orders.    COVID-19 and FLU A/B PCR - Swab, Nasopharynx [[id number]]  (Normal) Collected: 05/24/22 1301    Lab Status: Final result Specimen: Swab from Nasopharynx Updated: 05/24/22 1323     COVID19 Not Detected     Influenza A PCR Not Detected     Influenza B PCR Not Detected    Narrative:      Fact sheet for providers: https://www.fda.gov/media/880229/download    Fact sheet for patients: https://www.fda.gov/media/993869/download    Test performed by PCR.          Adult Transthoracic Echo Complete W/ Cont if Necessary Per Protocol (With Agitated Saline)    Result Date: 5/24/2022  · There is a (13 mm) mobile mass on the aortic valve. · Estimated left ventricular EF = 70%  Left ventricular systolic function is normal. · Left ventricular diastolic function was normal. · Estimated right ventricular systolic pressure from tricuspid regurgitation is normal (<35 mmHg). Calculated right ventricular systolic pressure from tricuspid regurgitation is 24 mmHg. · Saline test results are negative.      CT Head Without Contrast    Result Date: 5/27/2022   DATE OF EXAM: 5/27/2022 7:38 AM  PROCEDURE: CT HEAD WO CONTRAST-  INDICATIONS: Stroke, follow up; I63.511-Cerebral infarction due to unspecified occlusion or stenosis of right middle cerebral artery; R47.1-Dysarthria and anarthria; R41.841-Cognitive communication deficit; R13.11-Dysphagia, oral phase; I33.0-Acute and subacute infective endocarditis  COMPARISON: CT head without contrast 05/26/2022, 05/25/2022  TECHNIQUE: Routine transaxial cuts were obtained through the head without the administration of contrast. Automated exposure control and iterative reconstruction methods were used.  FINDINGS: Redemonstration of large area of hypodensity involving the right cerebral hemisphere related to evolving right MCA distribution infarct not significantly changed from the prior study. There is no midline shift. There is a tiny area of sulcal hyperdensity which could relate to petechial hemorrhage again noted involving the posterior parietal lobe on images 37-39, unchanged. Small area of hypodensity involving the medial right frontal lobe compatible with evolving infarct on image 22 appears unchanged. Additional smaller areas of acute ischemia seen in cerebral hemispheres and the left cerebellum better assessed on recent MRI. Mastoid air cells are well-aerated. Negative for sinus fluid level. Calvarium intact.      1. Overall no change from prior exam with evolving right MCA distribution infarct  and separate small evolving infarct involving the right frontal lobe. Additional scattered areas of acute ischemia better assessed on recent MRI. 2. Small area  of sulcal hyperdensity abutting right posterior parietal lobe is unchanged which may relate to a tiny area of petechial hemorrhage. No significant space-occupying intraparenchymal hemorrhage.  This report was finalized on 5/27/2022 8:13 AM by Wing Echevarria MD.      CT Head Without Contrast    Result Date: 5/26/2022  DATE OF EXAM: 5/26/2022 8:08 PM  PROCEDURE: CT HEAD WO CONTRAST-  INDICATIONS: Stroke, follow up; I63.511-Cerebral infarction due to unspecified occlusion or stenosis of right middle cerebral artery; R47.1-Dysarthria and anarthria; R41.841-Cognitive communication deficit; R13.11-Dysphagia, oral phase; I33.0-Acute and subacute infective endocarditis  COMPARISON: No comparisons available.  TECHNIQUE: Routine transaxial and coronal reconstruction images were obtained through the head without the administration of contrast. Automated exposure control and iterative reconstruction methods were used.  The radiation dose reduction device was turned on for each scan per the ALARA (As Low as Reasonably Achievable) protocol.  FINDINGS: Evolving right MCA territory infarct again noted without evidence of interval hemorrhage or significantly increased mass effect. Within the adjacent right parietal lobe, there is a tiny area of sulcal hyperdensity, possible tiny area of petechial hemorrhage. Small areas of acute infarct noted on recent MRI are not well seen on CT. There is no new territorial ischemia. The ventricles are unchanged in size and configuration. The orbits are unremarkable and the paranasal sinuses are grossly clear.      A tiny focus of sulcal hyperdensity is present posterior to the right MCA infarct, questionably representing a tiny area of petechial hemorrhage. Otherwise unchanged appearance of evolving right MCA territory infarct without evidence of avel hemorrhagic conversion or worsening mass effect.  This report was finalized on 5/26/2022 8:44 PM by Ja Bernard.      CT Head Without  Contrast    Result Date: 5/25/2022   DATE OF EXAM: 5/25/2022 5:33 AM  PROCEDURE: CT HEAD WO CONTRAST-  INDICATIONS: repeat head ct  COMPARISON: May 24, 2022  TECHNIQUE: Routine transaxial cuts were obtained through the head without the administration of contrast. Automated exposure control and iterative reconstruction methods were used.  FINDINGS: There is cytotoxic edema involving the right cerebral hemisphere similar to the prior exam compatible with area of right MCA territory infarct. There is no associated hemorrhage identified. The ventricles are not dilated. There are no abnormal extra-axial fluid collections. There is a mucous retention cyst or polyp left maxillary sinus. There is cerumen in the external auditory canals.      1.  Evolving changes of right MCA territory infarct. 2.  The scattered areas of restricted diffusion noted on MRI of the brain are not well-defined on the CT and could reflect sequela to embolic type phenomenon.  This report was finalized on 5/25/2022 8:07 AM by Eben Morillo MD.      CT Head Without Contrast    Result Date: 5/24/2022  DATE OF EXAM: 5/24/2022 7:39 PM  PROCEDURE: CT HEAD WO CONTRAST-  INDICATIONS: Stroke, follow up  COMPARISON: Earlier the same day  TECHNIQUE: Routine transaxial and coronal reconstruction images were obtained through the head without the administration of contrast. Automated exposure control and iterative reconstruction methods were used.  The radiation dose reduction device was turned on for each scan per the ALARA (As Low as Reasonably Achievable) protocol.  FINDINGS: No change in recently noted right MCA territory infarct with prominent pre and postcentral gyri involvement. No interval hemorrhage or increased mass effect. The ventricles are unchanged. The orbits are unremarkable. The calvarium is intact.      Unchanged evolving right MCA territory infarct without evidence of hemorrhage or increased mass effect.  This report was finalized on 5/24/2022  8:10 PM by Ja Bernard.      CT Head Without Contrast    Result Date: 5/24/2022  DATE OF EXAM: 5/24/2022 2:14 PM  PROCEDURE: CT HEAD WO CONTRAST-  INDICATIONS: Stroke, follow up  COMPARISON: No comparisons available.  TECHNIQUE: Routine transaxial and coronal reconstruction images were obtained through the head without the administration of contrast. Automated exposure control and iterative reconstruction methods were used.  The radiation dose reduction device was turned on for each scan per the ALARA (As Low as Reasonably Achievable) protocol.  FINDINGS: Transcortical hypoattenuation is present extending along the pre and postcentral gyri near the vertex down to the level of the brain as superior temporal lobe. There is some mild localized mass effect with overlying sulcal effacement. There is no midline shift or ventricular effacement. No evidence of hemorrhage. The orbits are unremarkable and the paranasal sinuses are grossly clear.      Evolving areas of acute infarct are present in the right pre and postcentral gyri as above, without evidence of significant global mass effect or hemorrhage.  This report was finalized on 5/24/2022 3:44 PM by Ja Bernard.      CT Angiogram Neck    Result Date: 5/24/2022  FINAL REPORT TECHNIQUE: null CLINICAL HISTORY: stroke left arm numb/weak COMPARISON: null FINDINGS: CTA neck Comparison: None provided. Findings: Post bolus IV contrast-enhanced spiral CT angiographic survey of neck performed in axial plane with multiplanar reformatted images obtained. Respiratory motion. No atherosclerosis. No significant stenosis, occlusion, or dissection involving extracranial carotid and vertebral arteries. Enlarged adenoids, uvula, and bilateral palatine tonsils. Polyp or retention cyst left maxillary sinus.     Impression: 1. Negative CTA neck. Authenticated by Debbie Salinas MD on 05/24/2022 05:14:27 AM    MRI Brain Without Contrast    Result Date: 5/24/2022  DATE OF EXAM:  5/24/2022 9:15 PM  PROCEDURE: MRI BRAIN WO CONTRAST-  INDICATIONS: Stroke, follow up  COMPARISON: CT performed earlier the same day  TECHNIQUE: Multiplanar multisequence images of the brain were performed without contrast according to routine brain MRI protocol.  FINDINGS: There is restricted diffusion compatible with acute infarct noted involving the right MCA territory as noted on recent CT, extending to the paramedian region consistent with likely ROSALINDA territory involvement. Additional scattered areas of punctate cortical infarct are also present in the right ROSALINDA territory, left posterior temporal lobe, right occipital lobe somewhat more prominently in the left cerebellum. Midline structures are unremarkable and the craniocervical junction is satisfactory in appearance. Areas of acute infarct demonstrate mild localized edema, without global mass effect or hemorrhage. The ventricles are normal in size and configuration. The orbits are unremarkable. Small mucous retention cyst is present in the left maxillary sinus.      Multifocal areas of acute infarct are present, including the large area of acute infarct involving the right pre and postcentral regions described on same-day CT. This infarct extends to involve the right posterior insula. Additional scattered areas of smaller acute infarct are present in the right ROSALINDA territory, bilateral temporal lobes, right occipital lobe and left cerebellum. There is no evidence of significant associated global mass effect or hemorrhage.  This report was finalized on 5/24/2022 10:02 PM by Ja Bernard.      Duplex Venous Lower Extremity - Bilateral CAR    Result Date: 5/24/2022  · Acute right lower extremity deep vein thrombosis noted in the common femoral, deep femoral, proximal femoral and mid femoral. · Acute right lower extremity superficial thrombophlebitis noted in the saphenofemoral junction.      CT Angiogram Head    Addendum Date: 5/24/2022    ADDENDUM REPORT  ADDENDUM: This report was discussed with Onel Haddad on May 24, 2022 05:14:00 EDT. Authenticated by Debbie Salinas MD on 05/24/2022 05:14:06 AM    Result Date: 5/24/2022  FINAL REPORT TECHNIQUE: null CLINICAL HISTORY: stroke left arm numbness/weakness COMPARISON: null FINDINGS: CTA head with 3D images Comparison: None provided. Findings: Post bolus IV contrast-enhanced spiral CT angiographic survey of head performed in axial plane with multiplanar reformatted and 3D images obtained. Occlusion of a long branch of M2 segment of right middle cerebral artery (series 5 images [id number]). Questionable significant stenosis/occlusion distal aspect of short branch of M2 segment of left middle cerebral artery. Minimal calcified arthroscopic plaque right carotid siphon. Limited by venous contamination and motion artifact. Remainder of arteries of [place] of [place] within normal limits. No aneurysm seen.     Impression: 1. Occlusion of a long branch of M2 segment of right middle cerebral artery. 2. Questionable significant stenosis/occlusion distal aspect of short branch of M2 segment of left middle cerebral artery. Authenticated by Debbie Salinas MD on 05/24/2022 05:12:11 AM    CT Head Without Contrast Stroke Protocol    Addendum Date: 5/24/2022    ADDENDUM REPORT ADDENDUM: This report was discussed with Dr. Onel Haddad on May 24, 2022 04:41:00 EDT. Authenticated by Debbie Salinas MD on 05/24/2022 04:41:28 AM    Result Date: 5/24/2022  FINAL REPORT TECHNIQUE: null CLINICAL HISTORY: left arm numbness/weakness possible stroke COMPARISON: null FINDINGS: CT head without contrast Comparison: None provided. Findings: Unenhanced CT survey of head performed in axial plane. Punctate hypodensity left cerebellum could presumably represent old lacunar infarcts but atypical for age. Small calcified arthroscopic plaque right internal carotid artery atypical for age. Patient motion related artifact. No intra- or extra-axial  hemorrhage seen. If clinically indicated, close follow-up recommended. No deep white matter edema or mass effect including midline shift. Ventricles and sulci are normal in size. No fracture seen. 2 cm polyp or mucous retention cyst left maxillary sinus.  Mastoid air cells are grossly clear.     Impression: 1. No acute intracranial CT abnormality evident. See above. Authenticated by Debbie Salinas MD on 05/24/2022 04:38:24 AM    CT CEREBRAL PERFUSION WITH & WITHOUT CONTRAST    Addendum Date: 5/24/2022    ADDENDUM REPORT ADDENDUM: This report was discussed with Dr. Onel Haddad on May 24, 2022 04:41:00 EDT. Authenticated by Debbie Salinas MD on 05/24/2022 04:41:45 AM    Result Date: 5/24/2022  FINAL REPORT TECHNIQUE: null CLINICAL HISTORY: left sided weakness/numbness stroke COMPARISON: null FINDINGS: CT cerebral perfusion Comparison: None provided. Findings: 45 mL volume of right parietal and left frontal lobe demonstrating significant delay of cerebral perfusion. Lesser 17 mL volume of right parietal lobe demonstrating significant diminution of cerebral blood flow or volume. Findings suggest 28 mL mismatch volume penumbra (salvageable ischemic brain tissue) with underlying ischemic core (infarct) in right parietal lobe. MRI may be confirmatory.     Impression: 1. Findings suggest 28 mL mismatch volume penumbra (salvageable ischemic brain tissue) in right parietal and left frontal lobes with underlying ischemic core (infarct) in right parietal lobe. MRI may be confirmatory. Authenticated by Debbie Salinas MD on 05/24/2022 04:38:10 AM    Invasive peripheral vascular study    Result Date: 5/24/2022  PREOPERATIVE DIAGNOSIS: Right M3 occlusion ? POSTOPERATIVE DIAGNOSIS: Same ? INDICATION: This is a 27-year-old male presented with motor and sensory issues on the left side of his body.  Imaging revealed an M3 occlusion with a favorable perfusion.  He was taken for emergent thrombectomy. .Emergency Consent Obtained  PROCEDURES PERFORMED: 1.  1 vessel diagnostic cerebral arteriogram. 2. Transcatheter endovascular thrombectomy for treatment of acute thromboembolic stroke. 3.  Superselective angiogram in an M3 vessel 4. Moderate Sedation ? Physician: Dr. Juventino Headley MD SEDATION: A total of 51 minutes of moderate sedation was provided by me. Physiologic vitals were monitored by an independently trained observer. A total of 50 mg of fentanyl were given. Vessels selected: ? 1.  Right internal carotid artery ? PROCEDURE: ? The patient was taken to the bi-plane neuroangiography suite and placed supine on the procedure table. The patient was prepped and draped in the usual sterile fashion. A procedural timeout was performed prior to beginning the procedure. ? Following the usual sterile prep and drape, a micropuncture kit and use of Seldinger technique was performed to gain access within the right common femoral artery. An 8 American sheath was then placed. An [employer] guide catheter was advanced over a 0.035 inch Terumo guidewire through the abdominal thoracic aorta under direct fluoroscopic guidance. This system was utilized to select the right internal carotid artery. The dilator and guidewire were removed. The catheter was aspirated and flushed with heparinized saline. The tip position was fluoroscopically confirmed. The catheter was maintained on heparinized saline perfusion. ? Under direct fluoroscopic guidance and using digital roadmap technique, a Shelfiea 71 aspiration catheter was passed over a 0.027 inch phenom 27 microcatheter which was passed over a 0.014 inch Synchro guidewire through the guide sheath and into the right internal carotid artery and the wire and microcatheter were passed across the site of occlusion in the M3 vessel.  The micro wire was removed and superselective angiogram confirmed that we are in the appropriate vessel.  At this time, a 3 mm Trevo stent retriever device was loaded into the microcatheter.   Under direct fluoroscopic vision, the stent retriever was slowly deployed and the microcatheter was removed. The Vecta 71 was advanced to the level of the site of occlusion.  The Vecta 71 catheter was then connected to aspiration for approximately 5 minutes. Under aspiration, the Vecta catheter was slowly removed and the Infinity was vigorously aspirated and flushed with heparinized saline. Follow up angiogram showed no visible thrombus, but continued lack of flow through the M3 vessel.  These findings were consistent with a TICI 1 thrombectomy.  At this time, all catheters were removed and an 8 [place] Angioseal closure device was deployed. Manual compression was then held on the groin until hemostasis was achieved. FINDINGS:  Angiography of the right internal carotid artery shows normal distal cervical, petrous, cavernous and clinoid segments. The ophthalmic artery fills normally. The posterior communicating artery and anterior choroidal artery are easily visualized and of normal caliber. The anterior cerebral artery fills normally in all segments.  There is an occlusion of an M3 cortical vessel in the inferior division.  There is visible thrombus at the site of the occlusion.  Follow-up angiogram after the above-mentioned thrombectomy shows no visible thrombus, but there is still lack of antegrade flow in this M3 vessel.  There is, however, good collateral circulation which resulted in retrograde filling of the distal vessel.  These findings are consistent with a TICI 1 thrombectomy. Superselective angiogram through the microcatheter in the M3 vessel shows good distal flow without any evidence of extravasation. ? Flour Time (min): 13.7 ? Air Kerma (mGy): 492 ? Contrast: 75 cc of 320 mg/ml Visipaque ?     TICI 1 thrombectomy of a right M3 cortical vessel.  No complications noted.      Results for orders placed during the hospital encounter of 05/24/22    Duplex Venous Lower Extremity - Bilateral  CAR    Interpretation Summary  · Acute right lower extremity deep vein thrombosis noted in the common femoral, deep femoral, proximal femoral and mid femoral.  · Acute right lower extremity superficial thrombophlebitis noted in the saphenofemoral junction.      Results for orders placed during the hospital encounter of 05/24/22    Duplex Venous Lower Extremity - Bilateral CAR    Interpretation Summary  · Acute right lower extremity deep vein thrombosis noted in the common femoral, deep femoral, proximal femoral and mid femoral.  · Acute right lower extremity superficial thrombophlebitis noted in the saphenofemoral junction.      Results for orders placed during the hospital encounter of 05/24/22    Adult Transthoracic Echo Complete W/ Cont if Necessary Per Protocol (With Agitated Saline)    Interpretation Summary  · There is a (13 mm) mobile mass on the aortic valve.  · Estimated left ventricular EF = 70% Left ventricular systolic function is normal.  · Left ventricular diastolic function was normal.  · Estimated right ventricular systolic pressure from tricuspid regurgitation is normal (<35 mmHg). Calculated right ventricular systolic pressure from tricuspid regurgitation is 24 mmHg.  · Saline test results are negative.      Plan for Follow-up of Pending Labs/Results:     Discharge Details        Discharge Medications      New Medications      Instructions Start Date   acetaminophen 325 MG tablet  Commonly known as: TYLENOL   650 mg, Oral, Every 6 Hours PRN      aspirin 81 MG chewable tablet   81 mg, Oral, Daily   Start Date: Margarita 3, 2022     atorvastatin 80 MG tablet  Commonly known as: LIPITOR   80 mg, Oral, Nightly      Enoxaparin Sodium 80 MG/0.8ML solution prefilled syringe syringe  Commonly known as: LOVENOX   1 mg/kg (80 mg), Subcutaneous, Every 12 Hours      FLUoxetine 10 MG capsule  Commonly known as: PROzac   10 mg, Oral, Daily   Start Date: Margarita 3, 2022     metoprolol tartrate 25 MG tablet  Commonly  "known as: LOPRESSOR   25 mg, Oral, Every 12 Hours Scheduled      warfarin 5 MG tablet  Commonly known as: Coumadin   5 mg, Oral, Nightly         Stop These Medications    rivaroxaban 15 MG tablet  Commonly known as: XARELTO            No Known Allergies      Discharge Disposition:  Skilled Nursing Facility (DC - External)    Diet:  Hospital:  Diet Order   Procedures   • Diet Regular       Activity:      Restrictions or Other Recommendations:  Daily INR until therapeutic 2-3       CODE STATUS:    Code Status and Medical Interventions:   Ordered at: 05/24/22 1518     Level Of Support Discussed With:    Patient     Code Status (Patient has no pulse and is not breathing):    CPR (Attempt to Resuscitate)     Medical Interventions (Patient has pulse or is breathing):    Full Support       Future Appointments   Date Time Provider Department Center   11/3/2022  1:30 PM Barry Clark MD [facility]       Additional Instructions for the Follow-ups that You Need to Schedule     Amb Referral to  [facility]   As directed       Select To DEPT SPEC to filter TO DEPT       Send INR reminders to the \"clinical pool\" of the TO DEPT     (ie:   [facility]  or MICHELLE [facility] CLINICAL POOL)     Protime-INR    Jun 07, 2022 (Approximate)      Release to patient: Immediate                     Lilia Marshall Steele DO  06/02/22      Time Spent on Discharge:  I spent  38  minutes on this discharge activity which included: face-to-face encounter with the patient, reviewing the data in the system, coordination of the care with the nursing staff as well as consultants, documentation, and entering orders.          "

## 2022-06-02 NOTE — PROGRESS NOTES
"Pharmacy Consult  -  Warfarin    Vargas Reyna is a  27 y.o. male   Height - 182.9 cm (72\")  Weight - 76.2 kg (167 lb 15.9 oz)    Consulting Provider: - Oncology  Indication: - RLE DVT/ aortic valve thrombosis/ acute rt. Sided CVA/ antiphospholipid syndrome  Goal INR: - 2-3  Home Regimen:   - New start  Bridge Therapy: Yes, enoxaparin      Drug-Drug Interactions with current regimen:   Aspirin - increased risk of bleeding    Enoxaparin may result in an increased risk of bleeding   Fluoxetine may result in an increased risk of bleeding   PRN acetaminophen may result in an increased risk of bleeding                Marijuana may result in increased effect of warfarin (increased INR/ risk of bleeding)    Warfarin Dosing During Admission:    Date  5/30 5/31 6/1 6/2        INR  1.21 1.18  1.24  1.47        Dose   5 mg  5 mg  7.5 mg  Total 7.5mg           Education Provided:  Warfarin education provided on 5/30 verbally and in writing.  Discussed effects of warfarin, importance of checking INR, drug-drug and drug-food interactions, and signs/symptoms of bleeding and clotting.  Patient verbalized understanding through teach back.  All pertinent questions were answered.       Discharge Follow up:    Following Provider -  [facility]   Follow up time range or appointment - soon after patient has finished acute rehab    Labs:    Results from last 7 days   Lab Units 06/02/22 0523 06/01/22 0453 05/31/22  0530 05/30/22  1135 05/29/22  0430 05/28/22  0431 05/27/22  0504   INR  1.47* 1.24* 1.18* 1.21*  --   --   --    HEMOGLOBIN g/dL 13.4  --  13.5  --  14.1 13.7 14.1   HEMATOCRIT % 39.2  --  38.8  --  41.4 40.8 43.5     Results from last 7 days   Lab Units 06/02/22 0523 05/29/22  0430 05/28/22  0431 05/27/22  0504   SODIUM mmol/L 133* 128*  --  131*   POTASSIUM mmol/L 3.7 3.9 3.8 4.0   CHLORIDE mmol/L 98 92*  --  96*   CO2 mmol/L 26.0 22.0  --  21.0*   BUN mg/dL 17 16  --  15   CREATININE mg/dL 0.94 1.00  --  " 0.90   CALCIUM mg/dL 9.4 9.3  --  9.6   BILIRUBIN mg/dL  --  0.7  --   --    ALK PHOS U/L  --  65  --   --    ALT (SGPT) U/L  --  22  --   --    AST (SGOT) U/L  --  20  --   --    GLUCOSE mg/dL 92 92  --  88     Current dietary intake: 25 % dinner on 5/30, otherwise, no documentation noted  Diet Order   Procedures    Diet Regular     Assessment/Plan:     Pharmacy consulted to dose warfarin (new start) for antiphospholipid syndrome with hx DVTs and new CVA. Initiated on 5/30/22.  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  Repeat ECHO in 4 weeks.    Patient's INR is sub therapeutic today at 1.47, though trending up.  (Day 3 of therapy)  Recommend to warfarin 7.5 mg tonight, then tomorrow resume warfarin 5 mg oral daily.        (Pt being transferred to [facility] this morning, discussed with Dr Olivares, will give 2.5 mg prior to discharge, then plan 5 mg tonight and daily, with adjustment per INR levels while there.  Therapeutic Lovenox bridge patient until INR therapeutic. Goal INR 2-3.   Referral to [facility] warfarin clinic has been placed.   Daily PT/INR ordered.  Pharmacy will monitor signs/symptoms of bleeding, dietary intake, and drug-drug interactions and make dose adjustments as minor Schmitz, PharmD  6/2/2022  09:47 EDT

## 2022-06-03 LAB — INR PPP: 2.2

## 2022-06-04 LAB — INR PPP: 2.5

## 2022-06-05 LAB
INR PPP: 2.6
QT INTERVAL: 312 MS
QTC INTERVAL: 459 MS

## 2022-06-06 ENCOUNTER — TELEPHONE (OUTPATIENT)
Dept: PHARMACY | Facility: HOSPITAL | Age: 27
End: 2022-06-06

## 2022-06-06 LAB — INR PPP: 2.2

## 2022-06-06 NOTE — TELEPHONE ENCOUNTER
Spoke with ISAC Bowie for Mr. Felipecharmaineraimundo at [facility], who will put a note in patient's chart to call when discharged so we can contact patient.

## 2022-06-07 LAB — INR PPP: 2

## 2022-06-08 ENCOUNTER — TELEPHONE (OUTPATIENT)
Dept: PHARMACY | Facility: HOSPITAL | Age: 27
End: 2022-06-08

## 2022-06-08 LAB — INR PPP: 1.9

## 2022-06-08 NOTE — TELEPHONE ENCOUNTER
Spoke with [facility], patient currently taking warfarin 5mg qd, last INR 1.9 6/8. Patient will be discharged 6/11 and come to clinic 6/14.

## 2022-06-09 LAB — INR PPP: 2.2

## 2022-06-10 LAB — INR PPP: 2.4

## 2022-06-11 LAB — INR PPP: 2.3

## 2022-06-14 ENCOUNTER — LAB (OUTPATIENT)
Dept: LAB | Facility: HOSPITAL | Age: 27
End: 2022-06-14

## 2022-06-14 ENCOUNTER — ANTICOAGULATION VISIT (OUTPATIENT)
Dept: PHARMACY | Facility: HOSPITAL | Age: 27
End: 2022-06-14

## 2022-06-14 DIAGNOSIS — D68.61 ANTIPHOSPHOLIPID SYNDROME: ICD-10-CM

## 2022-06-14 DIAGNOSIS — I82.4Z3 ACUTE DEEP VEIN THROMBOSIS (DVT) OF DISTAL VEIN OF BOTH LOWER EXTREMITIES: Primary | ICD-10-CM

## 2022-06-14 LAB
INR PPP: 1.99 (ref 0.84–1.13)
INR PPP: 2.5 (ref 0.91–1.09)
PROTHROMBIN TIME: 22.6 SECONDS (ref 11.4–14.4)
PROTHROMBIN TIME: 30.3 SECONDS (ref 10–13.8)

## 2022-06-14 PROCEDURE — 36415 COLL VENOUS BLD VENIPUNCTURE: CPT

## 2022-06-14 PROCEDURE — 85610 PROTHROMBIN TIME: CPT

## 2022-06-14 PROCEDURE — 36416 COLLJ CAPILLARY BLOOD SPEC: CPT

## 2022-06-14 RX ORDER — PANTOPRAZOLE SODIUM 40 MG/1
40 TABLET, DELAYED RELEASE ORAL DAILY
COMMUNITY
End: 2022-07-12 | Stop reason: SDUPTHER

## 2022-06-14 RX ORDER — BACLOFEN 10 MG/1
10 TABLET ORAL 3 TIMES DAILY PRN
COMMUNITY
End: 2022-06-22

## 2022-06-14 NOTE — PROGRESS NOTES
Anticoagulation Clinic Progress Note  Indication: antiphospholipid syndrome-Hx of bilateral lower extremity DVT/ aortic valve thrombosis/ acute rt. Sided CVA (5/3/22)  Referring Provider: Dr Barry Clark  Initial Warfarin Start Date: 5/30/22  Planned Duration of Therapy: indefinite   Goal INR: 2-3  Current Drug Interactions: Prozac, atorvastatin   Other: Xarelto (prior to stroke in 5/2022), Eliquis   [id number]:  Bleed Risk: No bleeding while on Xarelto  :   Diet: Stays away from  Alcohol: None  Tobacco: None; smokes marijuana   OTC Pain Medication: No Tylenol     Anticoagulation Clinic INR History:  Date 6/14          Total Weekly Dose 35 mg          INR 2.5 POCT/ 1.99 tarun          Notes Boost x1            Clinic Interview:  Tablet Strength: 5mg   Contact info: [phone] (Mary Anne Morris girlfriend) [phone]  Estimated OOP cost: [please send to registration if not already done]  Verbal Release: Petra Reyna (mother), Mary Anne Kenny (girlfriend)     Patient Findings:  Vargas Reyna is a new start to warfarin therapy. He was accompanied by his mother today for counseling. Discussed warfarin's indication, mechanism, and dosing. Also enforced the importance of taking warfarin as instructed and at the same time every day, preferably in the evening so that we can make dose adjustments more easily following subsequent clinic visits. He expressed understanding of this fact. We also discussed what he should do about a missed dose; pts can take missed doses within about 12 hours of their usual scheduled dose, but he was instructed on the importance of not doubling up on doses unless told to do so by the warfarin clinic. Explained possible side effects of warfarin therapy, including increased risk of bleeding, s/sx of bleeding and s/sx of any additional clots/PE/CVA. Also discussed monitoring of warfarin, the INR, goal INR range 2-3, and the frequency of monitoring. Explained that he would be  coming into the clinic more frequently in these first few weeks of therapy as we try to adjust his dose and achieve a therapeutic INR x 2 consecutive readings. Once that is achieved, pt will follow up in clinic every 4 weeks, on average. He stated no problems with transportation or scheduling clinic appts in this manner. Reviewed drug/food/tobacco/EtOH interactions and provided written information covering these topics in more detail, explaining that green, leafy vegetables interact most heavily with warfarin. Instructed the pt not to take or discontinue any medications without informing his physician/pharmacist and reminded him to inform us of any dietary changes, as well. He expressed understanding of the information provided and has no additional questions at this time.    Negatives:  Signs/symptoms of thrombosis, Signs/symptoms of bleeding, Laboratory test error suspected, Change in health, Change in alcohol use, Change in activity, Upcoming invasive procedure, Emergency department visit, Upcoming dental procedure, Missed doses, Extra doses, Change in medications, Change in diet/appetite, Hospital admission, Bruising, Other complaints   Comments:  Has been stable on 5mg daily dosing while at [facility] (waiting on faxed records), he states that his INR was within range the entire time he was there except one time it was 1.9.  Med rec completed. Counseled that due to his indication being APS, he would have to get venipuncture lab draws to ensure accuracy of INRs. He will go to the lab today to get venipuncture.    Vargas lives in [place] and would like to get INRs drawn at [facility] since it is close to his home, lab order entered.       Plan:  1. INR is slightly subtherapeutic at 1.99 after a venipuncture draw (goal 2-3) . Since patient had already left [facility] clinic, called Mary Anne and left instructions with her to have Vargas boost tonight's dose to 7.5mg, then continue warfarin 5mg daily until recheck.  Vargas had not  returned home form doctors visits yet and she di dnot know when he would be home.  Asked to have him call us with any questions.  2. Recheck INR on 6/23/22 at [facility] same day as his appointment with Dr Clark.  3. Medication reconciliation completed 6/14/22  4. Pt declines warfarin refills.  5. Verbal and written information provided. Vargas Reyna and his mother, both express understanding by teach back and has no further questions at this time.    Capri Begum, PharmD, BCPS   6/14/2022  13:58 EDT

## 2022-06-15 ENCOUNTER — TELEPHONE (OUTPATIENT)
Dept: PHARMACY | Facility: HOSPITAL | Age: 27
End: 2022-06-15

## 2022-06-15 ENCOUNTER — TELEPHONE (OUTPATIENT)
Dept: ONCOLOGY | Facility: CLINIC | Age: 27
End: 2022-06-15

## 2022-06-15 NOTE — TELEPHONE ENCOUNTER
Caller: Hermila Reyna    Relationship: Self    Best call back number: [phone]    What was the call regarding: HERMILA CALLED TO SAY THAT THE  ANTICOAGULATION CLINIC IN [place] WANTED HIM TO CALL AND LET THE OFFICE KNOW THAT THEY HAVE SET DR. BABB AS HIS PCP. HE SAYS THEY TOLD HIM THAT WOULD MAKE IT TO WHERE HE DOESN'T HAVE TO TRAVEL TO [place] TO GET HIS INR CHECKED. HE NEEDS A CALL BACK TO DISCUSS.    Do you require a callback: YES

## 2022-06-15 NOTE — TELEPHONE ENCOUNTER
Verified dosage with pt; he did take warfarin 7.5 mg last night, 6/14, and will take warfarin 5 mg every day until recheck.

## 2022-06-15 NOTE — TELEPHONE ENCOUNTER
Call back to Vargas.  Vargas states his PCP Dr Patel will remain as his PCP and get the INR results.  Encouraged Vargas to notify our office if he has any further needs

## 2022-06-17 ENCOUNTER — APPOINTMENT (OUTPATIENT)
Dept: PHARMACY | Facility: HOSPITAL | Age: 27
End: 2022-06-17

## 2022-06-22 ENCOUNTER — OFFICE VISIT (OUTPATIENT)
Dept: INTERNAL MEDICINE | Facility: CLINIC | Age: 27
End: 2022-06-22

## 2022-06-22 ENCOUNTER — TRANSCRIBE ORDERS (OUTPATIENT)
Dept: CARDIOLOGY | Facility: CLINIC | Age: 27
End: 2022-06-22

## 2022-06-22 ENCOUNTER — ANTICOAGULATION VISIT (OUTPATIENT)
Dept: INTERNAL MEDICINE | Facility: CLINIC | Age: 27
End: 2022-06-22

## 2022-06-22 VITALS
TEMPERATURE: 97.5 F | HEIGHT: 72 IN | RESPIRATION RATE: 17 BRPM | OXYGEN SATURATION: 97 % | SYSTOLIC BLOOD PRESSURE: 116 MMHG | BODY MASS INDEX: 23.7 KG/M2 | WEIGHT: 175 LBS | HEART RATE: 63 BPM | DIASTOLIC BLOOD PRESSURE: 85 MMHG

## 2022-06-22 DIAGNOSIS — D68.61 ANTIPHOSPHOLIPID SYNDROME: Primary | ICD-10-CM

## 2022-06-22 DIAGNOSIS — I63.511 ACUTE ISCHEMIC RIGHT MCA STROKE: ICD-10-CM

## 2022-06-22 DIAGNOSIS — I82.4Z3 ACUTE DEEP VEIN THROMBOSIS (DVT) OF DISTAL VEIN OF BOTH LOWER EXTREMITIES: Primary | ICD-10-CM

## 2022-06-22 DIAGNOSIS — I33.0 AORTIC VALVE VEGETATION: ICD-10-CM

## 2022-06-22 DIAGNOSIS — Z11.59 ENCOUNTER FOR HEPATITIS C SCREENING TEST FOR LOW RISK PATIENT: ICD-10-CM

## 2022-06-22 DIAGNOSIS — R00.0 TACHYCARDIA: ICD-10-CM

## 2022-06-22 DIAGNOSIS — D69.6 THROMBOCYTOPENIA: ICD-10-CM

## 2022-06-22 DIAGNOSIS — Z01.812 PRE-OPERATIVE LABORATORY EXAMINATION: Primary | ICD-10-CM

## 2022-06-22 LAB — INR PPP: 3.4 (ref 2–3)

## 2022-06-22 PROCEDURE — 99214 OFFICE O/P EST MOD 30 MIN: CPT | Performed by: INTERNAL MEDICINE

## 2022-06-22 PROCEDURE — 36416 COLLJ CAPILLARY BLOOD SPEC: CPT | Performed by: INTERNAL MEDICINE

## 2022-06-22 PROCEDURE — 85610 PROTHROMBIN TIME: CPT | Performed by: INTERNAL MEDICINE

## 2022-06-22 RX ORDER — WARFARIN SODIUM 4 MG/1
4 TABLET ORAL NIGHTLY
Qty: 30 TABLET | Refills: 2 | Status: SHIPPED | OUTPATIENT
Start: 2022-06-22 | End: 2022-07-28 | Stop reason: SDUPTHER

## 2022-06-22 RX ORDER — BACLOFEN 5 MG/1
TABLET ORAL
COMMUNITY
Start: 2022-06-08 | End: 2022-08-25 | Stop reason: SDUPTHER

## 2022-06-22 NOTE — PROGRESS NOTES
Chief Complaint   Patient presents with   • Establish Care   • Follow-up     From [facility], had a stroke 5/24-6/2 and then did 2 weeks at [facility]       Subjective     History of Present Illness   Vargas Reyna is a 27 y.o. male presenting for to establish care after recently being hospitalized for a stroke from 5/24-6/2 followed by 2 weeks at [facility] center.  Patient shares that over the last few months, he has been worked up for blood clotting disorder which seems to be antiphospholipid syndrome.  He initially had symptoms in July 2021 with DVT in the legs.  This was followed by blood clots in the renal arteries.  He was initially started on Xarelto followed by Eliquis which was recently stopped after he was diagnosed with a stroke.  Since then, he has been taking warfarin regularly.    Since coming from [facility], patient has been having significant left-sided weakness which is somewhat improved in the lower extremity but limited on the upper extremity.  He describes having difficulty with  strength      The following portions of the patient's history were reviewed and updated as appropriate: allergies, current medications, past family history, past medical history, past social history, past surgical history and problem list.    Review of Systems   Constitutional: Negative for chills, fatigue and fever.   HENT: Negative for congestion, ear pain, rhinorrhea, sinus pressure and sore throat.    Eyes: Negative for visual disturbance.   Respiratory: Negative for cough, chest tightness, shortness of breath and wheezing.    Cardiovascular: Negative for chest pain, palpitations and leg swelling.   Gastrointestinal: Negative for abdominal pain, blood in stool, constipation, diarrhea, nausea and vomiting.   Endocrine: Negative for polydipsia and polyuria.   Genitourinary: Negative for dysuria and hematuria.   Musculoskeletal: Negative for arthralgias and back pain.    Skin: Negative for rash.   Neurological: Positive for weakness (left sided). Negative for dizziness, light-headedness, numbness and headaches.   Psychiatric/Behavioral: Negative for dysphoric mood and sleep disturbance. The patient is not nervous/anxious.        No Known Allergies    Past Medical History:   Diagnosis Date   • Antiphospholipid syndrome (HCC)    • Deep vein thrombosis (HCC)    • GERD (gastroesophageal reflux disease)    • Stroke (HCC)        Social History     Socioeconomic History   • Marital status: Single   Tobacco Use   • Smoking status: Former Smoker     Packs/day: 0.50     Years: 3.00     Pack years: 1.50     Types: Cigarettes     Quit date: 2021     Years since quittin.9   • Smokeless tobacco: Former User   Vaping Use   • Vaping Use: Former   • Substances: years ago   Substance and Sexual Activity   • Alcohol use: Not Currently     Comment: social   • Drug use: Yes     Types: Marijuana     Comment: few times daily   • Sexual activity: Defer        Past Surgical History:   Procedure Laterality Date   • FRACTURE SURGERY     • INTERVENTIONAL RADIOLOGY PROCEDURE Bilateral 2022    Procedure: CAROTID CEREBRAL ANGIOGRAM BILATERAL;  Surgeon: Juventino Headley MD;  Location: [facility] INVASIVE LOCATION;  Service: Interventional Radiology;  Laterality: Bilateral;   • LEG SURGERY Right     spiral fracture repair three years ago        Family History   Problem Relation Age of Onset   • No Known Problems Mother          Current Outpatient Medications:   •  aspirin 81 MG chewable tablet, Chew 1 tablet Daily., Disp: , Rfl:   •  atorvastatin (LIPITOR) 80 MG tablet, Take 1 tablet by mouth Every Night., Disp: 90 tablet, Rfl:   •  Baclofen 5 MG tablet, PRN, Disp: , Rfl:   •  FLUoxetine (PROzac) 10 MG capsule, Take 1 capsule by mouth Daily., Disp: , Rfl:   •  metoprolol tartrate (LOPRESSOR) 25 MG tablet, Take 1 tablet by mouth Every 12 (Twelve) Hours., Disp: , Rfl:   •  pantoprazole (PROTONIX)  "40 MG EC tablet, Take 40 mg by mouth Daily., Disp: , Rfl:   •  warfarin (Coumadin) 4 MG tablet, Take 1 tablet by mouth Every Night., Disp: 30 tablet, Rfl: 2    Objective   /85   Pulse 63   Temp 97.5 °F (36.4 °C)   Resp 17   Ht 182.9 cm (72\")   Wt 79.4 kg (175 lb)   SpO2 97%   BMI 23.73 kg/m²     Physical Exam  Vitals and nursing note reviewed.   Constitutional:       Appearance: Normal appearance. He is well-developed.   HENT:      Head: Normocephalic and atraumatic.      Nose: Nose normal.      Mouth/Throat:      Mouth: Mucous membranes are moist.      Pharynx: No oropharyngeal exudate.   Eyes:      General: No scleral icterus.     Conjunctiva/sclera: Conjunctivae normal.      Pupils: Pupils are equal, round, and reactive to light.   Neck:      Thyroid: No thyromegaly.   Cardiovascular:      Rate and Rhythm: Normal rate and regular rhythm.      Heart sounds: Normal heart sounds. No murmur heard.    No friction rub. No gallop.   Pulmonary:      Effort: Pulmonary effort is normal. No respiratory distress.      Breath sounds: Normal breath sounds. No wheezing.   Abdominal:      General: Bowel sounds are normal. There is no distension.      Palpations: Abdomen is soft.      Tenderness: There is no abdominal tenderness.   Musculoskeletal:         General: No deformity or signs of injury.      Cervical back: Normal range of motion and neck supple.      Comments: Left upper extremity weakness with limited  strength in left hand.  Using cane to maintain balance while ambulating   Lymphadenopathy:      Cervical: No cervical adenopathy.   Skin:     General: Skin is warm and dry.      Findings: No rash.   Neurological:      Mental Status: He is alert and oriented to person, place, and time.   Psychiatric:         Mood and Affect: Mood normal.         Behavior: Behavior normal.       Results for orders placed or performed in visit on 06/14/22   Protime-INR    Specimen: Blood   Result Value Ref Range    Protime " 22.6 (H) 11.4 - 14.4 Seconds    INR 1.99 (H) 0.84 - 1.13         Assessment & Plan   Diagnoses and all orders for this visit:    1. Antiphospholipid syndrome (HCC) (Primary)  -     warfarin (Coumadin) 4 MG tablet; Take 1 tablet by mouth Every Night.  Dispense: 30 tablet; Refill: 2  -     CBC & Differential  -     Comprehensive Metabolic Panel  -     Lipid Panel    2. Acute ischemic right MCA stroke (HCC)  -     warfarin (Coumadin) 4 MG tablet; Take 1 tablet by mouth Every Night.  Dispense: 30 tablet; Refill: 2  -     CBC & Differential  -     Comprehensive Metabolic Panel  -     Lipid Panel    3. Aortic valve vegetation  -     Hepatitis Panel, Acute  -     Ambulatory Referral to Cardiology    4. Tachycardia    5. Thrombocytopenia (HCC)    6. Encounter for hepatitis C screening test for low risk patient  -     Hepatitis Panel, Acute          Discussion Summary:  Patient is a 27 y.o. male presenting for follow up.    Antiphospholipid syndrome  - Chronic anticoagulation  - INR is supratherapeutic today.  Hold warfarin today.  Then start warfarin 4 mg p.o. daily    Acute ischemic right MCA stroke  - Appears to be stable at this time.  Patient encouraged to continue following with physical therapy for strengthening and appropriate exercises.    Aortic valve vegetation  - Noted recent hospitalization.  Patient needs follow-up with cardiology and consideration for VALERI.  Patient prefers to see a local cardiologist in [place].  Referral has been placed.    Thrombocytopenia  - Follow-up with labs.             Follow up:  Return in about 6 weeks (around 8/3/2022).

## 2022-06-23 ENCOUNTER — OFFICE VISIT (OUTPATIENT)
Dept: ONCOLOGY | Facility: CLINIC | Age: 27
End: 2022-06-23

## 2022-06-23 VITALS
OXYGEN SATURATION: 99 % | SYSTOLIC BLOOD PRESSURE: 113 MMHG | RESPIRATION RATE: 12 BRPM | HEIGHT: 72 IN | TEMPERATURE: 97.1 F | BODY MASS INDEX: 23.84 KG/M2 | DIASTOLIC BLOOD PRESSURE: 74 MMHG | WEIGHT: 176 LBS | HEART RATE: 71 BPM

## 2022-06-23 DIAGNOSIS — D68.61 ANTIPHOSPHOLIPID SYNDROME: Primary | ICD-10-CM

## 2022-06-23 PROCEDURE — 99214 OFFICE O/P EST MOD 30 MIN: CPT | Performed by: INTERNAL MEDICINE

## 2022-06-23 NOTE — PROGRESS NOTES
Follow Up Office Visit      Date: 2022     Patient Name: Vargas Reyna  MRN: [id number]  : 1995  Referring Physician: Alysha Noe     Chief Complaint:  Follow-up for antiphospholipid syndrome and thrombocytopenia     History of Present Illness: Vargas Reyna is a pleasant 26 y.o. male the past medical history of bilateral lower extremity DVT who presents today for evaluation of DVT and thrombocytopenia. The patient states that he initially started to develop significant right-sided leg discomfort in July of this year.  He underwent an ultrasound which was notable for a right lower extremity DVT.  He denied any long car or plane rides.  Is not on any hormonal supplementation.  Denies any family history of blood clots.  He was started on Eliquis and completed almost 3 months of treatment when he started to develop left lower extremity pain and swelling.  He again underwent an ultrasound which was notable for a new DVT in the left lower extremity.  He has now been transitioned to Xarelto which she is tolerating well.  In regards to his thrombocytopenia, he has had multiple lab draws with platelet counts between 50-80K.  Per review of outside records, platelets have been clumped with these lab draws and have been adequate numbers during review of smear.  He denies any petechiae or any easy bleeding or bruising at this time.  Denies any family history of thrombocytopenia.     Interval History:  Presents to clinic for follow-up.   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  He was started on warfarin as an inpatient and is tolerating this well.  Has had significant improvement in his speech and left upper extremity movement and sensation.  He is compliant with his home medications and follow with his PCP and anticoagulation clinic for INR checks.    Oncology History:    Oncology/Hematology History    No  history exists.       Subjective      Review of Systems:   Constitutional: Negative for fevers, chills, or weight loss  Eyes: Negative for blurred vision or discharge         Ear/Nose/Throat: Negative for difficulty swallowing, sore throat, LAD                                                       Respiratory: Negative for cough, SOA, wheezing                                                                                        Cardiovascular: Negative for chest pain or palpitations                                                                  Gastrointestinal: Negative for nausea, vomiting or diarrhea                                                                     Genitourinary: Negative for dysuria or hematuria                                                                                           Musculoskeletal: Negative for any joint pains or muscle aches                                                                        Neurologic: Negative for any weakness, headaches, dizziness                                                                         Hematologic: Negative for any easy bleeding or bruising                                                                                   Psychiatric: Negative for anxiety or depression                          Past Medical History/Past Surgical History/ Family History/ Social History: Reviewed by me and unchanged from my previous documentation done on May 2022.     Medications:     Current Outpatient Medications:   •  aspirin 81 MG chewable tablet, Chew 1 tablet Daily., Disp: , Rfl:   •  atorvastatin (LIPITOR) 80 MG tablet, Take 1 tablet by mouth Every Night., Disp: 90 tablet, Rfl:   •  Baclofen 5 MG tablet, PRN, Disp: , Rfl:   •  FLUoxetine (PROzac) 10 MG capsule, Take 1 capsule by mouth Daily., Disp: , Rfl:   •  metoprolol tartrate (LOPRESSOR) 25 MG tablet, Take 1 tablet by mouth Every 12 (Twelve) Hours., Disp: , Rfl:   •  pantoprazole (PROTONIX)  "40 MG EC tablet, Take 40 mg by mouth Daily., Disp: , Rfl:   •  warfarin (Coumadin) 4 MG tablet, Take 1 tablet by mouth Every Night., Disp: 30 tablet, Rfl: 2    Allergies:   No Known Allergies    Objective     Physical Exam:  Vital Signs:   Vitals:    06/23/22 1356   BP: 113/74   Pulse: 71   Resp: 12   Temp: 97.1 °F (36.2 °C)   TempSrc: Temporal   SpO2: 99%   Weight: 79.8 kg (176 lb)   Height: 182.9 cm (72\")   PainSc: 0-No pain     Pain Score    06/23/22 1356   PainSc: 0-No pain     ECOG Performance Status: 1 - Symptomatic but completely ambulatory    Constitutional: NAD, ECOG 1  Eyes: PERRLA, scleral anicteric  ENT: No LAD, no thyromegaly  Respiratory: CTAB, no wheezing, rales, rhonchi  Cardiovascular: RRR, no murmurs, pulses 2+ bilaterally  Abdomen: soft, NT/ND, no HSM  Musculoskeletal: strength 5/5 bilaterally, no c/c/e  Neurologic: A&O x 3, CN II-XII intact grossly    Results Review:   Anticoagulation Visit on 06/22/2022   Component Date Value Ref Range Status   • INR 06/22/2022 3.40 (A) 2 - 3 Final   Lab on 06/14/2022   Component Date Value Ref Range Status   • Protime 06/14/2022 22.6 (A) 11.4 - 14.4 Seconds Final   • INR 06/14/2022 1.99 (A) 0.84 - 1.13 Final   Anticoagulation Visit on 06/14/2022   Component Date Value Ref Range Status   • Protime 06/14/2022 30.3 (A) 10.0 - 13.8 seconds Final    Meter: [id number] : [id number] Silas Amezcua   • INR 06/14/2022 2.5 (A) 0.91 - 1.09 Final   • INR 06/11/2022 2.30   Final   • INR 06/10/2022 2.40   Final   • INR 06/09/2022 2.20   Final   • INR 06/08/2022 1.90   Final   • INR 06/07/2022 2.00   Final   • INR 06/06/2022 2.20   Final   • INR 06/05/2022 2.6   Corrected   • INR 06/04/2022 2.50   Final   • INR 06/03/2022 2.20   Final       Adult Transthoracic Echo Complete W/ Cont if Necessary Per Protocol (With Agitated Saline)    Result Date: 5/24/2022  Narrative: · There is a (13 mm) mobile mass on the aortic valve. · Estimated left ventricular EF = 70% Left ventricular " systolic function is normal. · Left ventricular diastolic function was normal. · Estimated right ventricular systolic pressure from tricuspid regurgitation is normal (<35 mmHg). Calculated right ventricular systolic pressure from tricuspid regurgitation is 24 mmHg. · Saline test results are negative.      CT Head Without Contrast    Result Date: 5/27/2022  Narrative:  DATE OF EXAM: 5/27/2022 7:38 AM  PROCEDURE: CT HEAD WO CONTRAST-  INDICATIONS: Stroke, follow up; I63.511-Cerebral infarction due to unspecified occlusion or stenosis of right middle cerebral artery; R47.1-Dysarthria and anarthria; R41.841-Cognitive communication deficit; R13.11-Dysphagia, oral phase; I33.0-Acute and subacute infective endocarditis  COMPARISON: CT head without contrast 05/26/2022, 05/25/2022  TECHNIQUE: Routine transaxial cuts were obtained through the head without the administration of contrast. Automated exposure control and iterative reconstruction methods were used.  FINDINGS: Redemonstration of large area of hypodensity involving the right cerebral hemisphere related to evolving right MCA distribution infarct not significantly changed from the prior study. There is no midline shift. There is a tiny area of sulcal hyperdensity which could relate to petechial hemorrhage again noted involving the posterior parietal lobe on images 37-39, unchanged. Small area of hypodensity involving the medial right frontal lobe compatible with evolving infarct on image 22 appears unchanged. Additional smaller areas of acute ischemia seen in cerebral hemispheres and the left cerebellum better assessed on recent MRI. Mastoid air cells are well-aerated. Negative for sinus fluid level. Calvarium intact.      Impression: 1. Overall no change from prior exam with evolving right MCA distribution infarct  and separate small evolving infarct involving the right frontal lobe. Additional scattered areas of acute ischemia better assessed on recent MRI. 2. Small  area of sulcal hyperdensity abutting right posterior parietal lobe is unchanged which may relate to a tiny area of petechial hemorrhage. No significant space-occupying intraparenchymal hemorrhage.  This report was finalized on 5/27/2022 8:13 AM by Wing Echevarria MD.      CT Head Without Contrast    Result Date: 5/26/2022  Narrative: DATE OF EXAM: 5/26/2022 8:08 PM  PROCEDURE: CT HEAD WO CONTRAST-  INDICATIONS: Stroke, follow up; I63.511-Cerebral infarction due to unspecified occlusion or stenosis of right middle cerebral artery; R47.1-Dysarthria and anarthria; R41.841-Cognitive communication deficit; R13.11-Dysphagia, oral phase; I33.0-Acute and subacute infective endocarditis  COMPARISON: No comparisons available.  TECHNIQUE: Routine transaxial and coronal reconstruction images were obtained through the head without the administration of contrast. Automated exposure control and iterative reconstruction methods were used.  The radiation dose reduction device was turned on for each scan per the ALARA (As Low as Reasonably Achievable) protocol.  FINDINGS: Evolving right MCA territory infarct again noted without evidence of interval hemorrhage or significantly increased mass effect. Within the adjacent right parietal lobe, there is a tiny area of sulcal hyperdensity, possible tiny area of petechial hemorrhage. Small areas of acute infarct noted on recent MRI are not well seen on CT. There is no new territorial ischemia. The ventricles are unchanged in size and configuration. The orbits are unremarkable and the paranasal sinuses are grossly clear.      Impression: A tiny focus of sulcal hyperdensity is present posterior to the right MCA infarct, questionably representing a tiny area of petechial hemorrhage. Otherwise unchanged appearance of evolving right MCA territory infarct without evidence of avel hemorrhagic conversion or worsening mass effect.  This report was finalized on 5/26/2022 8:44 PM by Ja Bernard.       CT Head Without Contrast    Result Date: 5/25/2022  Narrative:  DATE OF EXAM: 5/25/2022 5:33 AM  PROCEDURE: CT HEAD WO CONTRAST-  INDICATIONS: repeat head ct  COMPARISON: May 24, 2022  TECHNIQUE: Routine transaxial cuts were obtained through the head without the administration of contrast. Automated exposure control and iterative reconstruction methods were used.  FINDINGS: There is cytotoxic edema involving the right cerebral hemisphere similar to the prior exam compatible with area of right MCA territory infarct. There is no associated hemorrhage identified. The ventricles are not dilated. There are no abnormal extra-axial fluid collections. There is a mucous retention cyst or polyp left maxillary sinus. There is cerumen in the external auditory canals.      Impression: 1.  Evolving changes of right MCA territory infarct. 2.  The scattered areas of restricted diffusion noted on MRI of the brain are not well-defined on the CT and could reflect sequela to embolic type phenomenon.  This report was finalized on 5/25/2022 8:07 AM by Eben Morillo MD.      CT Head Without Contrast    Result Date: 5/24/2022  Narrative: DATE OF EXAM: 5/24/2022 7:39 PM  PROCEDURE: CT HEAD WO CONTRAST-  INDICATIONS: Stroke, follow up  COMPARISON: Earlier the same day  TECHNIQUE: Routine transaxial and coronal reconstruction images were obtained through the head without the administration of contrast. Automated exposure control and iterative reconstruction methods were used.  The radiation dose reduction device was turned on for each scan per the ALARA (As Low as Reasonably Achievable) protocol.  FINDINGS: No change in recently noted right MCA territory infarct with prominent pre and postcentral gyri involvement. No interval hemorrhage or increased mass effect. The ventricles are unchanged. The orbits are unremarkable. The calvarium is intact.      Impression: Unchanged evolving right MCA territory infarct without evidence of hemorrhage  or increased mass effect.  This report was finalized on 5/24/2022 8:10 PM by Ja Bernard.      MRI Brain Without Contrast    Result Date: 5/24/2022  Narrative: DATE OF EXAM: 5/24/2022 9:15 PM  PROCEDURE: MRI BRAIN WO CONTRAST-  INDICATIONS: Stroke, follow up  COMPARISON: CT performed earlier the same day  TECHNIQUE: Multiplanar multisequence images of the brain were performed without contrast according to routine brain MRI protocol.  FINDINGS: There is restricted diffusion compatible with acute infarct noted involving the right MCA territory as noted on recent CT, extending to the paramedian region consistent with likely ROSALINDA territory involvement. Additional scattered areas of punctate cortical infarct are also present in the right ROSALINDA territory, left posterior temporal lobe, right occipital lobe somewhat more prominently in the left cerebellum. Midline structures are unremarkable and the craniocervical junction is satisfactory in appearance. Areas of acute infarct demonstrate mild localized edema, without global mass effect or hemorrhage. The ventricles are normal in size and configuration. The orbits are unremarkable. Small mucous retention cyst is present in the left maxillary sinus.      Impression: Multifocal areas of acute infarct are present, including the large area of acute infarct involving the right pre and postcentral regions described on same-day CT. This infarct extends to involve the right posterior insula. Additional scattered areas of smaller acute infarct are present in the right ROSALINDA territory, bilateral temporal lobes, right occipital lobe and left cerebellum. There is no evidence of significant associated global mass effect or hemorrhage.  This report was finalized on 5/24/2022 10:02 PM by Ja Bernard.      Duplex Venous Lower Extremity - Bilateral CAR    Result Date: 5/24/2022  Narrative: · Acute right lower extremity deep vein thrombosis noted in the common femoral, deep femoral,  proximal femoral and mid femoral. · Acute right lower extremity superficial thrombophlebitis noted in the saphenofemoral junction.        Assessment / Plan      Assessment/Plan:   Antiphospholipid syndrome  -Diagnosed as an outpatient and followed by me  -Status post multiple thrombotic events including DVTs, renal arterial stenosis and now CVA  -Per my last outpatient note, we discussed transitioning to warfarin vs continuing Xarelto 15 mg twice daily.  Patient elected to continue Xarelto 15 mg twice daily as he was uncertain whether he could be compliant with the dietary and lab restrictions of warfarin  -Per patient, he was taking Xarelto 20 mg daily at home prior to the event  -Right lower extremity duplex notable for DVT and superficial thrombophlebitis.   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   -Started warfarin on 5/30/2022.  Goal INR of 2-3  -Dosing managed by anticoagulation clinic   -Repeat VALERI with cardiology in 4 weeks     Acute right-sided CVA  -Likely secondary to thrombus on his aortic valve from his antiphospholipid syndrome  -Significant improvement of his speech and muscle strength on his left side  -Has neurology follow-up on 7/8/2022  -Advised patient to continue home physical therapy     Aortic valve thrombosis  -Secondary to antiphospholipid syndrome  -Continue warfarin treatment as above  -CT surgery and cardiology consulted while inpatient with no plans for surgical intervention and repeat VALERI in the middle of July     Right lower extremity DVT  -Noted on ultrasound  -Continue indefinite warfarin as above    Bilateral renal arterial stenosis  -Noted previously during hospitalization in [place]  -Continue indefinite warfarin as above    Thrombocytopenia (HCC) (Primary)  -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  No indication for platelet transfusion or bone marrow biopsy        Follow Up:   Follow-up in 2 months     Barry Clark MD  Hematology and Oncology     Please note that portions of this note may have been completed with a voice recognition program. Efforts were made to edit the dictations, but occasionally words are mistranscribed.

## 2022-06-27 ENCOUNTER — PREP FOR SURGERY (OUTPATIENT)
Dept: OTHER | Facility: HOSPITAL | Age: 27
End: 2022-06-27

## 2022-06-27 DIAGNOSIS — I33.0 AORTIC VALVE VEGETATION: Primary | ICD-10-CM

## 2022-06-27 RX ORDER — SODIUM CHLORIDE 0.9 % (FLUSH) 0.9 %
10 SYRINGE (ML) INJECTION AS NEEDED
Status: CANCELLED | OUTPATIENT
Start: 2022-06-27

## 2022-06-27 RX ORDER — SODIUM CHLORIDE 0.9 % (FLUSH) 0.9 %
10 SYRINGE (ML) INJECTION EVERY 12 HOURS SCHEDULED
Status: CANCELLED | OUTPATIENT
Start: 2022-06-27

## 2022-06-29 ENCOUNTER — TELEPHONE (OUTPATIENT)
Dept: PHARMACY | Facility: HOSPITAL | Age: 27
End: 2022-06-29

## 2022-06-29 ENCOUNTER — ANTICOAGULATION VISIT (OUTPATIENT)
Dept: INTERNAL MEDICINE | Facility: CLINIC | Age: 27
End: 2022-06-29

## 2022-06-29 DIAGNOSIS — I82.4Z3 ACUTE DEEP VEIN THROMBOSIS (DVT) OF DISTAL VEIN OF BOTH LOWER EXTREMITIES: Primary | ICD-10-CM

## 2022-06-29 LAB — INR PPP: 1.6 (ref 2–3)

## 2022-06-29 PROCEDURE — 85610 PROTHROMBIN TIME: CPT | Performed by: INTERNAL MEDICINE

## 2022-06-29 PROCEDURE — 36416 COLLJ CAPILLARY BLOOD SPEC: CPT | Performed by: INTERNAL MEDICINE

## 2022-06-29 NOTE — TELEPHONE ENCOUNTER
Spoke with patient, he reports Dr Patel has been managing his warfarin dosing and called in 4mg tablets. Instructed patient to take 6mg tonight and LVM with Dr Patel's office to determine who will manage moving forward

## 2022-06-30 ENCOUNTER — TELEPHONE (OUTPATIENT)
Dept: PHARMACY | Facility: HOSPITAL | Age: 27
End: 2022-06-30

## 2022-06-30 NOTE — TELEPHONE ENCOUNTER
Discussed warfarin management with Karin at Dr Patel's office, the patient's PCP. Per Karin since the patient is getting PT/INR drawn at their office they will need to manage the patient's warfarin. Have also called patient to inform him that warfarin will be managed by Dr Patel's office. Have closed the patient's encounter with [facility].    Melina McallisterD, BCPS  6/30/2022  16:52 EDT

## 2022-06-30 NOTE — TELEPHONE ENCOUNTER
Dr. Clark,    Mr Reyna was referred to [facility] 5/30/22 for warfarin management in setting of antiphospholipid syndrome. However since the patient is getting their INRs drawn at Dr Dustin Patel's office (the patient's PCP, [facility] in [place]), their office is wanting to manage the patient's warfarin from now on. We have closed his anticoagulation management episode. Please contact us if we can be of further assistance.    Braulio Chinchilla, PharmD, [facility]  [facility]  6/30/2022  16:59 EDT

## 2022-07-06 ENCOUNTER — ANTICOAGULATION VISIT (OUTPATIENT)
Dept: INTERNAL MEDICINE | Facility: CLINIC | Age: 27
End: 2022-07-06

## 2022-07-06 DIAGNOSIS — D68.61 ANTIPHOSPHOLIPID SYNDROME: Primary | ICD-10-CM

## 2022-07-06 DIAGNOSIS — I82.4Z3 ACUTE DEEP VEIN THROMBOSIS (DVT) OF DISTAL VEIN OF BOTH LOWER EXTREMITIES: ICD-10-CM

## 2022-07-06 LAB — INR PPP: 2.1 (ref 0.9–1.1)

## 2022-07-06 PROCEDURE — 85610 PROTHROMBIN TIME: CPT | Performed by: INTERNAL MEDICINE

## 2022-07-06 PROCEDURE — 36416 COLLJ CAPILLARY BLOOD SPEC: CPT | Performed by: INTERNAL MEDICINE

## 2022-07-07 NOTE — PROGRESS NOTES
New Patient Office Visit      Encounter Date: 2022   Patient Name: Vargas Reyna  : 1995   MRN: [id number]   PCP: Dustin Patel DO    Referring Provider: No ref. provider found     Chief Complaint:    Chief Complaint   Patient presents with   • Stroke      follow up        History of Present Illness: Vargas Reyna is a 27 y.o. male  PMH of antiphospholipid syndrome on Xarelto, previously failed Eliquis, who declined Coumadin in the past, renal infarcts, and multiple DVTs who is here today to establish care.  He was admitted to [facility] on 2022 with a right MCA stroke.  He was noted to have thrombus in the M2 segment of his right MCA.  He underwent an unsuccessful R MCA M2 thrombectomy. 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 Heme-onc was consulted.  Patient was started on Coumadin by Dr. Clark with heme-onc.  NIH at discharge improved to 3.  He went to rehab at [facility] for 2 weeks.  He followed up with Dr. Clark at the end of .  Patient has been compliant with Coumadin and INR checks.  He is scheduled for a repeat VALERI on  to evaluate for resolution of aortic valve mass.    Since discharge, patient has been doing well.  He has improved strength in his left upper extremity and left lower extremity.  He is walking with a quad cane as needed.  He is continuing to have intermittent spasms in his left lower extremity are worse at night.  Patient states they resolve when he takes baclofen.  He reports that he supposed to start outpatient PT/OT with [facility] but has not heard back from them.  Would prefer to do PT and OT in [place] closer to his home.    Stroke Risk Factors: hypercoagulable state      Subjective      Review of Systems:   Review of Systems   HENT: Negative.    Eyes: Negative for photophobia and visual disturbance.   Respiratory: Negative for shortness of breath and wheezing.    Cardiovascular: Negative for chest pain and  palpitations.   Musculoskeletal: Positive for gait problem.   Neurological: Positive for facial asymmetry, weakness and numbness.   Psychiatric/Behavioral: Negative.        Past Medical History:   Past Medical History:   Diagnosis Date   • Antiphospholipid syndrome (HCC)    • Deep vein thrombosis (HCC)    • GERD (gastroesophageal reflux disease)    • Stroke (HCC)        Past Surgical History:   Past Surgical History:   Procedure Laterality Date   • FRACTURE SURGERY     • INTERVENTIONAL RADIOLOGY PROCEDURE Bilateral 2022    Procedure: CAROTID CEREBRAL ANGIOGRAM BILATERAL;  Surgeon: Juventino Headley MD;  Location: [facility] INVASIVE LOCATION;  Service: Interventional Radiology;  Laterality: Bilateral;   • LEG SURGERY Right     spiral fracture repair three years ago        Family History:   Family History   Problem Relation Age of Onset   • No Known Problems Mother        Social History:   Social History     Socioeconomic History   • Marital status: Single   Tobacco Use   • Smoking status: Former Smoker     Packs/day: 0.50     Years: 3.00     Pack years: 1.50     Types: Cigarettes     Quit date: 2021     Years since quittin.9   • Smokeless tobacco: Former User   Vaping Use   • Vaping Use: Former   • Substances: years ago   Substance and Sexual Activity   • Alcohol use: Not Currently     Comment: social   • Drug use: Yes     Types: Marijuana     Comment: few times daily   • Sexual activity: Defer       Medications:     Current Outpatient Medications:   •  aspirin 81 MG chewable tablet, Chew 1 tablet Daily., Disp: , Rfl:   •  atorvastatin (LIPITOR) 80 MG tablet, Take 1 tablet by mouth Every Night., Disp: 90 tablet, Rfl:   •  Baclofen 5 MG tablet, PRN, Disp: , Rfl:   •  FLUoxetine (PROzac) 10 MG capsule, Take 1 capsule by mouth Daily., Disp: , Rfl:   •  metoprolol tartrate (LOPRESSOR) 25 MG tablet, Take 1 tablet by mouth Every 12 (Twelve) Hours., Disp: , Rfl:   •  pantoprazole (PROTONIX) 40 MG EC tablet,  "Take 40 mg by mouth Daily., Disp: , Rfl:   •  warfarin (Coumadin) 4 MG tablet, Take 1 tablet by mouth Every Night., Disp: 30 tablet, Rfl: 2  •  warfarin (COUMADIN) 5 MG tablet, Take 5 mg by mouth Daily. Alternate between 4mg and 5mg., Disp: , Rfl:     Allergies:   No Known Allergies    Objective     Physical Exam:  Vital Signs:   Vitals:    07/08/22 0827   BP: 102/82   BP Location: Right arm   Patient Position: Sitting   Cuff Size: Adult   Pulse: 56   Temp: 98 °F (36.7 °C)   TempSrc: Infrared   SpO2: 99%   Weight: 83.9 kg (185 lb)   Height: 182.9 cm (72\")   PainSc: 0-No pain     Body mass index is 25.09 kg/m².     Physical Exam  Constitutional:       Appearance: Normal appearance.   HENT:      Head: Normocephalic and atraumatic.   Eyes:      Extraocular Movements: Extraocular movements intact.      Conjunctiva/sclera: Conjunctivae normal.      Pupils: Pupils are equal, round, and reactive to light.   Cardiovascular:      Rate and Rhythm: Normal rate and regular rhythm.   Pulmonary:      Effort: Pulmonary effort is normal. No respiratory distress.   Abdominal:      General: There is no distension.      Palpations: Abdomen is soft.   Musculoskeletal:         General: Normal range of motion.      Cervical back: Normal range of motion and neck supple.   Skin:     General: Skin is warm and dry.   Neurological:      Mental Status: He is alert and oriented to person, place, and time.      Sensory: Sensory deficit present.      Motor: Weakness present.      Gait: Gait abnormal.      Deep Tendon Reflexes:      Reflex Scores:       Patellar reflexes are 2+ on the right side and 2+ on the left side.  Psychiatric:         Mood and Affect: Mood normal.         Speech: Speech normal.         Behavior: Behavior normal.       Neurological Exam  Mental Status  Alert. Oriented to person, place, time and situation. Oriented to person, place, and time. Speech is normal. Language is fluent with no aphasia. Fund of knowledge is " appropriate for level of education.    Cranial Nerves  CN III, IV, VI: Extraocular movements intact bilaterally. Pupils equal round and reactive to light bilaterally.  CN V:  Left: Diminished sensation of the entire left side of the face.  CN VII:  Left: Minimal left facial droop.  CN IX, X: Palate elevates symmetrically  CN XI: Shoulder shrug strength is normal.  CN XII: Tongue midline without atrophy or fasciculations.    Motor   Strength is 5/5 in all four extremities except as noted.  LUE/LLE 4-5/5.    Sensory  Light touch abnormality: Left-sided sensory deficit.     Reflexes                                            Right                      Left  Patellar                                2+                         2+  Plantar                           Downgoing                Downgoing    Coordination  Right: Finger-to-nose normal.    Gait   Abnormal gait.  Intermittent gait instability noted.  Walks with a quad cane as needed..       Modified [place] Score:   MODIFIED DARIA SCALE (to be assessed for each patient having history of stroke) []Stroke history but not assessed  []0: No symptoms at all  []1: No significant disability despite symptoms  [x]2: Slight disability  []3: Moderate disability  []4: Moderately severe disability  []5: Severe disability  []6: Death       PHQ-9 Depression Screening  Little interest or pleasure in doing things?  0   Feeling down, depressed, or hopeless?  0   Trouble falling or staying asleep, or sleeping too much?  0   Feeling tired or having little energy?     Poor appetite or overeating?     Feeling bad about yourself - or that you are a failure or have let yourself or your family down?     Trouble concentrating on things, such as reading the newspaper or watching television?     Moving or speaking so slowly that other people could have noticed? Or the opposite - being so fidgety or restless that you have been moving around a lot more than usual?     Thoughts that you would be  better off dead, or of hurting yourself in some way?     PHQ-9 Total Score  0   If you checked off any problems, how difficult have these problems made it for you to do your work, take care of things at home, or get along with other people?         Imaging Reviewed:   CT Head Without Contrast    Result Date: 5/27/2022  1. Overall no change from prior exam with evolving right MCA distribution infarct  and separate small evolving infarct involving the right frontal lobe. Additional scattered areas of acute ischemia better assessed on recent MRI. 2. Small area of sulcal hyperdensity abutting right posterior parietal lobe is unchanged which may relate to a tiny area of petechial hemorrhage. No significant space-occupying intraparenchymal hemorrhage.  This report was finalized on 5/27/2022 8:13 AM by Wing Echevarria MD.      CT Head Without Contrast    Result Date: 5/26/2022  A tiny focus of sulcal hyperdensity is present posterior to the right MCA infarct, questionably representing a tiny area of petechial hemorrhage. Otherwise unchanged appearance of evolving right MCA territory infarct without evidence of avel hemorrhagic conversion or worsening mass effect.  This report was finalized on 5/26/2022 8:44 PM by Ja Bernard.      CT Head Without Contrast    Result Date: 5/25/2022  1.  Evolving changes of right MCA territory infarct. 2.  The scattered areas of restricted diffusion noted on MRI of the brain are not well-defined on the CT and could reflect sequela to embolic type phenomenon.  This report was finalized on 5/25/2022 8:07 AM by Eben Morillo MD.      CT Head Without Contrast    Result Date: 5/24/2022  Unchanged evolving right MCA territory infarct without evidence of hemorrhage or increased mass effect.  This report was finalized on 5/24/2022 8:10 PM by Ja Bernard.      CT Head Without Contrast    Result Date: 5/24/2022  Evolving areas of acute infarct are present in the right pre and postcentral gyri  as above, without evidence of significant global mass effect or hemorrhage.  This report was finalized on 5/24/2022 3:44 PM by Ja Bernard.      CT Angiogram Neck    Result Date: 5/24/2022  Impression: 1. Negative CTA neck. Authenticated by Debbie Salinas MD on 05/24/2022 05:14:27 AM    MRI Brain Without Contrast    Result Date: 5/24/2022  Multifocal areas of acute infarct are present, including the large area of acute infarct involving the right pre and postcentral regions described on same-day CT. This infarct extends to involve the right posterior insula. Additional scattered areas of smaller acute infarct are present in the right ROSALINDA territory, bilateral temporal lobes, right occipital lobe and left cerebellum. There is no evidence of significant associated global mass effect or hemorrhage.  This report was finalized on 5/24/2022 10:02 PM by Ja Bernard.      CT Angiogram Head    Addendum Date: 5/24/2022    ADDENDUM REPORT ADDENDUM: This report was discussed with Onel Haddad on May 24, 2022 05:14:00 EDT. Authenticated by Debbie Salinas MD on 05/24/2022 05:14:06 AM    Result Date: 5/24/2022  Impression: 1. Occlusion of a long branch of M2 segment of right middle cerebral artery. 2. Questionable significant stenosis/occlusion distal aspect of short branch of M2 segment of left middle cerebral artery. Authenticated by Debbie Salinas MD on 05/24/2022 05:12:11 AM    CT Head Without Contrast Stroke Protocol    Addendum Date: 5/24/2022    ADDENDUM REPORT ADDENDUM: This report was discussed with Dr. Onel Haddad on May 24, 2022 04:41:00 EDT. Authenticated by Debbie Salinas MD on 05/24/2022 04:41:28 AM    Result Date: 5/24/2022  Impression: 1. No acute intracranial CT abnormality evident. See above. Authenticated by Debbie Salinas MD on 05/24/2022 04:38:24 AM    CT CEREBRAL PERFUSION WITH & WITHOUT CONTRAST    Addendum Date: 5/24/2022    ADDENDUM REPORT ADDENDUM: This report was discussed with   Onel Haddad on May 24, 2022 04:41:00 EDT. Authenticated by Debbie Salinas MD on 05/24/2022 04:41:45 AM    Result Date: 5/24/2022  Impression: 1. Findings suggest 28 mL mismatch volume penumbra (salvageable ischemic brain tissue) in right parietal and left frontal lobes with underlying ischemic core (infarct) in right parietal lobe. MRI may be confirmatory. Authenticated by Debbie Salinas MD on 05/24/2022 04:38:10 AM    Invasive peripheral vascular study    Result Date: 5/24/2022   TICI 1 thrombectomy of a right M3 cortical vessel.  No complications noted.      Laboratory Results:   WBC   Date Value Ref Range Status   06/02/2022 7.99 3.40 - 10.80 10*3/mm3 Final     RBC   Date Value Ref Range Status   06/02/2022 4.76 4.14 - 5.80 10*6/mm3 Final     Hemoglobin   Date Value Ref Range Status   06/02/2022 13.4 13.0 - 17.7 g/dL Final     Hematocrit   Date Value Ref Range Status   06/02/2022 39.2 37.5 - 51.0 % Final     MCV   Date Value Ref Range Status   06/02/2022 82.4 79.0 - 97.0 fL Final     MCH   Date Value Ref Range Status   06/02/2022 28.2 26.6 - 33.0 pg Final     MCHC   Date Value Ref Range Status   06/02/2022 34.2 31.5 - 35.7 g/dL Final     RDW   Date Value Ref Range Status   06/02/2022 12.0 (L) 12.3 - 15.4 % Final     RDW-SD   Date Value Ref Range Status   06/02/2022 36.5 (L) 37.0 - 54.0 fl Final     MPV   Date Value Ref Range Status   06/02/2022 10.3 6.0 - 12.0 fL Final     Platelets   Date Value Ref Range Status   06/02/2022 117 (L) 140 - 450 10*3/mm3 Final     Neutrophil %   Date Value Ref Range Status   06/02/2022 61.8 42.7 - 76.0 % Final     Lymphocyte %   Date Value Ref Range Status   06/02/2022 22.3 19.6 - 45.3 % Final     Monocyte %   Date Value Ref Range Status   06/02/2022 10.8 5.0 - 12.0 % Final     Eosinophil %   Date Value Ref Range Status   06/02/2022 3.9 0.3 - 6.2 % Final     Basophil %   Date Value Ref Range Status   06/02/2022 0.8 0.0 - 1.5 % Final     Immature Grans %   Date Value Ref Range  Status   06/02/2022 0.4 0.0 - 0.5 % Final     Neutrophils, Absolute   Date Value Ref Range Status   06/02/2022 4.95 1.70 - 7.00 10*3/mm3 Final     Lymphocytes, Absolute   Date Value Ref Range Status   06/02/2022 1.78 0.70 - 3.10 10*3/mm3 Final     Monocytes, Absolute   Date Value Ref Range Status   06/02/2022 0.86 0.10 - 0.90 10*3/mm3 Final     Eosinophils, Absolute   Date Value Ref Range Status   06/02/2022 0.31 0.00 - 0.40 10*3/mm3 Final     Basophils, Absolute   Date Value Ref Range Status   06/02/2022 0.06 0.00 - 0.20 10*3/mm3 Final     Immature Grans, Absolute   Date Value Ref Range Status   06/02/2022 0.03 0.00 - 0.05 10*3/mm3 Final     nRBC   Date Value Ref Range Status   06/02/2022 0.0 0.0 - 0.2 /100 WBC Final     Lab Results   Component Value Date    GLUCOSE 92 06/02/2022    BUN 17 06/02/2022    CREATININE 0.94 06/02/2022    EGFRIFNONA 121 12/31/2021    BCR 18.1 06/02/2022    K 3.7 06/02/2022    CO2 26.0 06/02/2022    CALCIUM 9.4 06/02/2022    ALBUMIN 4.20 06/02/2022    AST 20 05/29/2022    ALT 22 05/29/2022     Lipid Panel    Lipid Panel 5/25/22   Total Cholesterol 133   Triglycerides 130   HDL Cholesterol 32 (A)   VLDL Cholesterol 23   LDL Cholesterol  78   LDL/HDL Ratio 2.34   (A) Abnormal value            Hemoglobin A1c- 4.9      Assessment / Plan      Assessment/Plan:   Diagnoses and all orders for this visit:    1. History of ischemic right MCA stroke (Primary)  -Continue Coumadin  -Continue high-dose statin.  LDL on 5/25/22 is 78. Goal less than 70 for secondary stroke prevention.  -Blood pressure goals <130/80  -Serum glucose <140  -Ambulatory referral to Physical Therapy at [facility]  -Ambulatory referral to Occupational Therapy at [facility]  -Continue baclofen for left lower extremity spasms    2. Antiphospholipid syndrome (HCC)  -Followed by Dr. Clark with heme-onc  -Continue Coumadin    3. Aortic valve vegetation  -Followed by cardiology  -Plan for repeat VALERI on 7/18 to assess for resolution of aortic  valve vegetation  -Continue Coumadin       Stroke education including lifestyle/risk factor modifications, medication and medical regimen compliance, and s/s of stroke and when to seek emergent care discussed.  Patient family verbalized understanding.     Stroke Plan:    Stroke Education   Blood Pressure control to < 130/80   Goal LDL <70-recommend high dose statins   Serum Glucose < 140   Call 911 for stroke any stroke symptoms    Follow Up:   Return 2-3 months, for Recheck.    TRE Arroyo  [facility]

## 2022-07-08 ENCOUNTER — OFFICE VISIT (OUTPATIENT)
Dept: NEUROLOGY | Facility: CLINIC | Age: 27
End: 2022-07-08

## 2022-07-08 VITALS
OXYGEN SATURATION: 99 % | DIASTOLIC BLOOD PRESSURE: 82 MMHG | WEIGHT: 185 LBS | HEART RATE: 56 BPM | SYSTOLIC BLOOD PRESSURE: 102 MMHG | BODY MASS INDEX: 25.06 KG/M2 | TEMPERATURE: 98 F | HEIGHT: 72 IN

## 2022-07-08 DIAGNOSIS — Z86.73 HISTORY OF ISCHEMIC RIGHT MCA STROKE: Primary | ICD-10-CM

## 2022-07-08 DIAGNOSIS — D68.61 ANTIPHOSPHOLIPID SYNDROME: ICD-10-CM

## 2022-07-08 DIAGNOSIS — I33.0 AORTIC VALVE VEGETATION: ICD-10-CM

## 2022-07-08 PROCEDURE — 99214 OFFICE O/P EST MOD 30 MIN: CPT | Performed by: NURSE PRACTITIONER

## 2022-07-08 RX ORDER — WARFARIN SODIUM 5 MG/1
5 TABLET ORAL
COMMUNITY
End: 2022-07-28 | Stop reason: SDUPTHER

## 2022-07-12 NOTE — TELEPHONE ENCOUNTER
Incoming Refill Request      Medication requested (name and dose): aspirin 81 MG chewable tablet, atorvastatin (LIPITOR) 80 MG tablet, FLUoxetine (PROzac) 10 MG capsule, metoprolol tartrate (LOPRESSOR) 25 MG tablet, pantoprazole (PROTONIX) 40 MG EC tablet    Pharm[facility] where request should be sent: [facility]    Additional details provided by patient: PATIENT IS OUT OF THE MEDICATION    Best call back number:[phone]     Does the patient have less than a 3 day supply:  [x] Yes  [] No    Aman Low Rep  07/12/22, 17:16 EDT

## 2022-07-13 RX ORDER — FLUOXETINE 10 MG/1
10 CAPSULE ORAL DAILY
Qty: 90 CAPSULE | Refills: 3 | Status: SHIPPED | OUTPATIENT
Start: 2022-07-13 | End: 2022-08-25

## 2022-07-13 RX ORDER — PANTOPRAZOLE SODIUM 40 MG/1
40 TABLET, DELAYED RELEASE ORAL DAILY
Qty: 90 TABLET | Refills: 3 | Status: SHIPPED | OUTPATIENT
Start: 2022-07-13 | End: 2022-08-25

## 2022-07-13 RX ORDER — ASPIRIN 81 MG/1
81 TABLET, CHEWABLE ORAL DAILY
Qty: 90 TABLET | Refills: 3 | Status: SHIPPED | OUTPATIENT
Start: 2022-07-13 | End: 2022-08-25

## 2022-07-13 RX ORDER — ATORVASTATIN CALCIUM 80 MG/1
80 TABLET, FILM COATED ORAL NIGHTLY
Qty: 90 TABLET | Refills: 3 | Status: SHIPPED | OUTPATIENT
Start: 2022-07-13 | End: 2022-08-25

## 2022-07-18 ENCOUNTER — HOSPITAL ENCOUNTER (OUTPATIENT)
Dept: CARDIOLOGY | Facility: HOSPITAL | Age: 27
Discharge: HOME | End: 2022-07-18
Admitting: INTERNAL MEDICINE

## 2022-07-18 VITALS
OXYGEN SATURATION: 97 % | HEART RATE: 61 BPM | SYSTOLIC BLOOD PRESSURE: 110 MMHG | RESPIRATION RATE: 16 BRPM | DIASTOLIC BLOOD PRESSURE: 84 MMHG

## 2022-07-18 DIAGNOSIS — I33.0 AORTIC VALVE VEGETATION: ICD-10-CM

## 2022-07-18 LAB
BH CV ECHO MEAS - AO ROOT DIAM: 3.9 CM
LV EF 2D ECHO EST: 55 %
MAXIMAL PREDICTED HEART RATE: 195 BPM
STRESS TARGET HR: 166 BPM

## 2022-07-18 PROCEDURE — 93312 ECHO TRANSESOPHAGEAL: CPT | Performed by: INTERNAL MEDICINE

## 2022-07-18 PROCEDURE — 25010000002 FENTANYL CITRATE (PF) 50 MCG/ML SOLUTION: Performed by: INTERNAL MEDICINE

## 2022-07-18 PROCEDURE — 93312 ECHO TRANSESOPHAGEAL: CPT

## 2022-07-18 PROCEDURE — 93321 DOPPLER ECHO F-UP/LMTD STD: CPT

## 2022-07-18 PROCEDURE — 25010000002 MIDAZOLAM PER 1 MG: Performed by: INTERNAL MEDICINE

## 2022-07-18 PROCEDURE — 93325 DOPPLER ECHO COLOR FLOW MAPG: CPT

## 2022-07-18 PROCEDURE — 93325 DOPPLER ECHO COLOR FLOW MAPG: CPT | Performed by: INTERNAL MEDICINE

## 2022-07-18 RX ORDER — FENTANYL CITRATE 50 UG/ML
INJECTION, SOLUTION INTRAMUSCULAR; INTRAVENOUS
Status: COMPLETED | OUTPATIENT
Start: 2022-07-18 | End: 2022-07-18

## 2022-07-18 RX ORDER — MIDAZOLAM HYDROCHLORIDE 1 MG/ML
INJECTION INTRAMUSCULAR; INTRAVENOUS
Status: COMPLETED | OUTPATIENT
Start: 2022-07-18 | End: 2022-07-18

## 2022-07-18 RX ADMIN — FENTANYL CITRATE 50 MCG: 50 INJECTION, SOLUTION INTRAMUSCULAR; INTRAVENOUS at 14:22

## 2022-07-18 RX ADMIN — MIDAZOLAM HYDROCHLORIDE 2 MG: 1 INJECTION, SOLUTION INTRAMUSCULAR; INTRAVENOUS at 14:18

## 2022-07-18 RX ADMIN — FENTANYL CITRATE 50 MCG: 50 INJECTION, SOLUTION INTRAMUSCULAR; INTRAVENOUS at 14:18

## 2022-07-18 NOTE — H&P
[facility] Cardiology    Date of Hospital Visit: 22    Place of Service: [facility]    Patient Name: Vargas Reyna  :1995      Primary Care Provider: Dustin Patel DO    Chief complaint/Reason for Consultation:  CVA/Valvular Thrombus    Problem List:  1.   aortic valve thrombus  1.  six 2D echo: LVEF = 70%.  There is a 13 mm mobile mass on aortic valve.  LV diastolic function normal.  RVSP due to TR normal <35; = 24.  Saline test negative.  2.  VALERI:  2. History of antiphospholipid syndrome  1. Starkly failed Eliquis therapy and Xarelto therapy  3. Bilateral DVTs  1. Noted while on Eliquis therapy and Xarelto therapy  4.  acute ischemic CVA of right MCA territory  1. 2022 [facility] transfer after being diagnosed with above at OSH  2. Aortic valve mass noted on transthoracic echocardiogram  3. Unsuccessful mechanical thrombectomy per Dr. Headley  5. NSVT  1. Initiated on beta-blockade therapy with metoprolol 25 twice daily  6. Bilateral renal infarcts  7. Current tobacco abuse  8. Thrombocytopenia  9. Tachycardia  10. Surgical  1. Fracture surgery  2. Leg surgery      History of Present Illness:  Vargas Moore is a 27-year-old  male with the above past medical history who presented to [facility] on 2022 for VALERI evaluation of a previously diagnosed 13 mm mobile mass on aortic valve diagnosed in the context of an acute right-sided CVA, bilateral DVTs, and renal artery thrombi.    Upon chart review, it appears the patient was hospitalized in  for acute CVA for which the patient underwent a 2D echocardiogram to assess for heart structure and function.  Upon echocardiogram evaluation, a 13 mm mobile mass on the aortic valve was appreciated.  Due to the patient's significant hypercoagulability, current CVA, and renal artery thrombi empiric heparin therapy with bridge to warfarin therapy was initiated.     A VALERI was  deferred while in hospital due to the acute CVA and suspected valvular thrombi.  The mass was suspected to be secondary to his hypercoagulability and would hopefully resolve upon initiation of Lovenox with bridge to warfarin therapy.    No Known Allergies    Current Outpatient Medications   Medication Instructions   • aspirin 81 mg, Oral, Daily   • atorvastatin (LIPITOR) 80 mg, Oral, Nightly   • Baclofen 5 MG tablet PRN   • FLUoxetine (PROZAC) 10 mg, Oral, Daily   • metoprolol tartrate (LOPRESSOR) 25 mg, Oral, Every 12 Hours Scheduled   • pantoprazole (PROTONIX) 40 mg, Oral, Daily   • warfarin (COUMADIN) 4 mg, Oral, Nightly   • warfarin (COUMADIN) 5 mg, Oral, Daily Warfarin, Alternate between 4mg and 5mg.           Social History     Socioeconomic History   • Marital status: Single   Tobacco Use   • Smoking status: Former Smoker     Packs/day: 0.50     Years: 3.00     Pack years: 1.50     Types: Cigarettes     Quit date: 2021     Years since quittin.9   • Smokeless tobacco: Former User   Vaping Use   • Vaping Use: Former   • Substances: years ago   Substance and Sexual Activity   • Alcohol use: Not Currently     Comment: social   • Drug use: Yes     Types: Marijuana     Comment: few times daily   • Sexual activity: Defer       Family History   Problem Relation Age of Onset   • No Known Problems Mother        REVIEW OF SYSTEMS:   Review of Systems   Neurological: Positive for focal weakness.   All other systems reviewed and are negative.      Objective:  Vitals:    22 1359   BP: 102/92   Pulse: 63   Resp: 16   SpO2: 100%     There is no height or weight on file to calculate BMI.      Vitals and nursing note reviewed.   Constitutional:       General: Awake. Not in acute distress.     Appearance: Healthy appearance. Well-developed and not in distress.   Eyes:      General: No scleral icterus.     Conjunctiva/sclera: Conjunctivae normal.      Pupils: Pupils are equal, round, and reactive to light.   HENT:       Head: Normocephalic and atraumatic.      Nose: Nose normal.    Mouth/Throat:      Pharynx: Uvula midline.   Neck:      Trachea: No tracheal deviation.   Pulmonary:      Effort: Pulmonary effort is normal.      Breath sounds: Normal breath sounds. No wheezing. No rhonchi. No rales.   Cardiovascular:      Normal rate. Regular rhythm. Normal S1. Normal S2.      Murmurs: There is no murmur.      No gallop. No click. No rub.   Pulses:     Intact distal pulses.   Edema:     Peripheral edema absent.   Abdominal:      General: Bowel sounds are normal.      Palpations: Abdomen is soft.   Musculoskeletal:      Cervical back: Normal range of motion and neck supple. Skin:     General: Skin is warm and dry.      Findings: No erythema.   Neurological:      Mental Status: Alert, oriented to person, place, and time and oriented to person, place and time.      Motor: Weakness present.      Comments: Focal weakness appreciated   Psychiatric:         Attention and Perception: Attention normal.         Mood and Affect: Mood normal.         Speech: Speech normal.         Behavior: Behavior normal.             Lab Review:               CrCl cannot be calculated (Patient's most recent lab result is older than the maximum 30 days allowed.).    Lab Results   Component Value Date    CHOL 133 05/25/2022    TRIG 130 05/25/2022    HDL 32 (L) 05/25/2022    LDL 78 05/25/2022           Assessment:   · Aortic valve thrombus         Plan:   · VALERI evaluation of the previously aortic valve thrombus.  Patient is n.p.o., understands the procedure, understands the risks of procedure, is willing to proceed with VALERI evaluation of previously diagnosed aortic valve thrombus.      Electronically signed by Mihir Leon PA-C, 07/18/22, 2:01 PM EDT.

## 2022-07-25 ENCOUNTER — ANTICOAGULATION VISIT (OUTPATIENT)
Dept: INTERNAL MEDICINE | Facility: CLINIC | Age: 27
End: 2022-07-25

## 2022-07-25 DIAGNOSIS — I63.511 ACUTE ISCHEMIC RIGHT MCA STROKE: ICD-10-CM

## 2022-07-25 DIAGNOSIS — I82.4Z3 ACUTE DEEP VEIN THROMBOSIS (DVT) OF DISTAL VEIN OF BOTH LOWER EXTREMITIES: ICD-10-CM

## 2022-07-25 DIAGNOSIS — D68.61 ANTIPHOSPHOLIPID SYNDROME: Primary | ICD-10-CM

## 2022-07-25 LAB — INR PPP: 1.6 (ref 0.9–1.1)

## 2022-07-25 PROCEDURE — 36416 COLLJ CAPILLARY BLOOD SPEC: CPT | Performed by: INTERNAL MEDICINE

## 2022-07-25 PROCEDURE — 85610 PROTHROMBIN TIME: CPT | Performed by: INTERNAL MEDICINE

## 2022-07-25 RX ORDER — WARFARIN SODIUM 1 MG/1
1 TABLET ORAL NIGHTLY
Qty: 30 TABLET | Refills: 0 | Status: SHIPPED | OUTPATIENT
Start: 2022-07-25 | End: 2022-09-23 | Stop reason: SDUPTHER

## 2022-07-28 ENCOUNTER — OFFICE VISIT (OUTPATIENT)
Dept: INTERNAL MEDICINE | Facility: CLINIC | Age: 27
End: 2022-07-28

## 2022-07-28 VITALS
WEIGHT: 191.6 LBS | HEIGHT: 72 IN | BODY MASS INDEX: 25.95 KG/M2 | HEART RATE: 70 BPM | DIASTOLIC BLOOD PRESSURE: 84 MMHG | OXYGEN SATURATION: 98 % | TEMPERATURE: 98.1 F | SYSTOLIC BLOOD PRESSURE: 128 MMHG | RESPIRATION RATE: 16 BRPM

## 2022-07-28 DIAGNOSIS — D68.61 ANTIPHOSPHOLIPID SYNDROME: ICD-10-CM

## 2022-07-28 DIAGNOSIS — R07.1 CHEST PAIN ON BREATHING: ICD-10-CM

## 2022-07-28 DIAGNOSIS — R05.9 COUGH: Primary | ICD-10-CM

## 2022-07-28 DIAGNOSIS — I63.511 ACUTE ISCHEMIC RIGHT MCA STROKE: ICD-10-CM

## 2022-07-28 PROCEDURE — 99214 OFFICE O/P EST MOD 30 MIN: CPT | Performed by: NURSE PRACTITIONER

## 2022-07-28 RX ORDER — BENZONATATE 100 MG/1
100 CAPSULE ORAL 3 TIMES DAILY PRN
Qty: 21 CAPSULE | Refills: 0 | Status: SHIPPED | OUTPATIENT
Start: 2022-07-28 | End: 2022-08-25

## 2022-07-28 RX ORDER — WARFARIN SODIUM 5 MG/1
5 TABLET ORAL
Qty: 30 TABLET | Refills: 0 | Status: SHIPPED | OUTPATIENT
Start: 2022-07-28 | End: 2022-09-23 | Stop reason: SDUPTHER

## 2022-07-28 RX ORDER — WARFARIN SODIUM 4 MG/1
4 TABLET ORAL NIGHTLY
Qty: 30 TABLET | Refills: 0 | Status: SHIPPED | OUTPATIENT
Start: 2022-07-28 | End: 2022-09-23 | Stop reason: SDUPTHER

## 2022-07-28 RX ORDER — WARFARIN SODIUM 4 MG/1
4 TABLET ORAL NIGHTLY
Qty: 30 TABLET | Refills: 2 | Status: CANCELLED | OUTPATIENT
Start: 2022-07-28

## 2022-07-28 RX ORDER — WARFARIN SODIUM 5 MG/1
5 TABLET ORAL
Status: CANCELLED | OUTPATIENT
Start: 2022-07-28

## 2022-07-28 NOTE — PROGRESS NOTES
Chief Complaint   Patient presents with   • Chest Pain     Thinks he may have pleuracy, pain lower L ribcage area, dry cough X 5-6 days, hx of blood clots, recent stroke     Subjective   Vargas Reyna is a 27 y.o. male.     History of Present Illness     Patient presents with possible pleuracy. Onset 5 days ago.  Started with dry cough. No fever, aches, chills, shortness of air.  He does have mild congestion/rhinorrhea (states not out of the ordinary for him).  Worse with deep inhalation. No respiratory distress. O2 sats 98%.   He has had a recent ischemic right MCA stroke and diagnosed with Antiphospholipid syndrome and started on warfarin.  He alternates 6mg and 4 mg daily. Last INR 1.6. Being managed by hematology.  Returns for INR recheck next 1-2 weeks. Denies any abnormal bruising/bleeding.   He reports needing refills of warfarin as he was only prescribed the 1 mg dosage last time.     INR    [facility] Labs[facility] 6/11/22 6/14/22 6/14/22 6/22/22 6/29/22 7/6/22 7/25/22     1053 1246       INR 2.30 2.5 (A) 1.99 (A) 3.40 (A) 1.60 (A) 2.10 (A) 1.60 (A)   (A) Abnormal value                The following portions of the patient's history were reviewed and updated as appropriate: allergies, current medications, past family history, past medical history, past social history, past surgical history and problem list.    Review of Systems   Constitutional: Negative.    HENT: Positive for congestion and rhinorrhea. Negative for sore throat.    Eyes: Negative.    Respiratory: Positive for cough (dry). Negative for chest tightness, shortness of breath and wheezing.    Cardiovascular: Positive for chest pain (left lower ribs). Negative for palpitations and leg swelling.   Gastrointestinal: Negative.    Genitourinary: Negative.    Musculoskeletal: Negative.    Neurological: Negative.    All other systems reviewed and are negative.      Objective   /84   Pulse 70   Temp 98.1 °F (36.7 °C) (Temporal)   Resp 16   Ht  "182.9 cm (72\")   Wt 86.9 kg (191 lb 9.6 oz)   SpO2 98%   BMI 25.99 kg/m²   Body mass index is 25.99 kg/m².  Physical Exam  Vitals and nursing note reviewed.   Constitutional:       General: He is not in acute distress.     Appearance: Normal appearance. He is not ill-appearing, toxic-appearing or diaphoretic.   HENT:      Head: Normocephalic and atraumatic.   Eyes:      Extraocular Movements: Extraocular movements intact.      Pupils: Pupils are equal, round, and reactive to light.   Cardiovascular:      Rate and Rhythm: Normal rate and regular rhythm.   Pulmonary:      Effort: Pulmonary effort is normal. No tachypnea, accessory muscle usage or respiratory distress.      Breath sounds: Normal breath sounds and air entry. No stridor or decreased air movement. No wheezing, rhonchi or rales.   Chest:      Chest wall: No tenderness.   Musculoskeletal:         General: Normal range of motion.      Cervical back: Normal range of motion.      Right lower leg: No edema.      Left lower leg: No edema.   Skin:     General: Skin is warm and dry.      Coloration: Skin is not jaundiced or pale.      Findings: No bruising, erythema or rash.   Neurological:      General: No focal deficit present.      Mental Status: He is alert and oriented to person, place, and time.   Psychiatric:         Mood and Affect: Mood normal.         Behavior: Behavior normal.         Thought Content: Thought content normal.         Judgment: Judgment normal.         Assessment & Plan   Vargas Reyna is here today and the following problems have been addressed:      Diagnoses and all orders for this visit:    1. Cough (Primary)  -     CT Chest With & Without Contrast  -     XR Chest PA & Lateral  -     benzonatate (Tessalon Perles) 100 MG capsule; Take 1 capsule by mouth 3 (Three) Times a Day As Needed for Cough.  Dispense: 21 capsule; Refill: 0    2. Chest pain on breathing  -     CT Chest With & Without Contrast  -     XR Chest PA & " Lateral    3. Antiphospholipid syndrome (HCC)  -     CT Chest With & Without Contrast  -     warfarin (COUMADIN) 5 MG tablet; Take 1 tablet by mouth Daily. Alternate between 4mg and 5mg.  Dispense: 30 tablet; Refill: 0  -     warfarin (Coumadin) 4 MG tablet; Take 1 tablet by mouth Every Night.  Dispense: 30 tablet; Refill: 0    4. Acute ischemic right MCA stroke (HCC)  -     warfarin (COUMADIN) 5 MG tablet; Take 1 tablet by mouth Daily. Alternate between 4mg and 5mg.  Dispense: 30 tablet; Refill: 0  -     warfarin (Coumadin) 4 MG tablet; Take 1 tablet by mouth Every Night.  Dispense: 30 tablet; Refill: 0    Attempted to order a CT due to patient's history of stroke/antiphospholipid syndrome to rule out PE.  Insurance will not cover a stat test and is pending for review for up to 72 hours. Patient is in no respiratory distress with normal O2 saturations and clear lung sounds.  He is not tachypneic or using accessory muscles to breathe.  We will order a chest x-ray and update POC prn and treat cough with Tessalon Perles to take as needed. Will call when CT is scheduled. Differential diagnoses discussed; more than likely an upper respiratory infection but with history he needs to monitor for worsening symptoms.  Patient cannot take steroids due to interacting with warfarin. He can take Mucinex and push lots of fluids.  Instructed patient to let us know before he takes any over-the-counter medications due to many interactions with warfarin.  If symptoms persist- shortness of air, worsening chest pain, color changes, worsening cough, any distress he needs to be evaluated in the emergency department. Continue warfarin alternating 6 mg and 4 mg as advised by hematology.     Follow Up   Return if symptoms worsen or fail to improve.  Patient was given instructions and counseling regarding his condition or for health maintenance advice. Please see specific information pulled into the AVS if appropriate.     Dottie Linder  TRE  [facility]

## 2022-08-01 ENCOUNTER — ANTICOAGULATION VISIT (OUTPATIENT)
Dept: INTERNAL MEDICINE | Facility: CLINIC | Age: 27
End: 2022-08-01

## 2022-08-01 DIAGNOSIS — I63.511 ACUTE ISCHEMIC RIGHT MCA STROKE: Primary | ICD-10-CM

## 2022-08-01 LAB — INR PPP: 1.6 (ref 0.9–1.1)

## 2022-08-01 PROCEDURE — 36416 COLLJ CAPILLARY BLOOD SPEC: CPT | Performed by: INTERNAL MEDICINE

## 2022-08-01 PROCEDURE — 85610 PROTHROMBIN TIME: CPT | Performed by: INTERNAL MEDICINE

## 2022-08-08 ENCOUNTER — ANTICOAGULATION VISIT (OUTPATIENT)
Dept: INTERNAL MEDICINE | Facility: CLINIC | Age: 27
End: 2022-08-08

## 2022-08-08 DIAGNOSIS — I63.511 ACUTE ISCHEMIC RIGHT MCA STROKE: Primary | ICD-10-CM

## 2022-08-08 LAB — INR PPP: 4 (ref 0.9–1.1)

## 2022-08-08 PROCEDURE — 36416 COLLJ CAPILLARY BLOOD SPEC: CPT | Performed by: INTERNAL MEDICINE

## 2022-08-08 PROCEDURE — 85610 PROTHROMBIN TIME: CPT | Performed by: INTERNAL MEDICINE

## 2022-08-22 ENCOUNTER — TELEPHONE (OUTPATIENT)
Dept: INTERNAL MEDICINE | Facility: CLINIC | Age: 27
End: 2022-08-22

## 2022-08-22 NOTE — TELEPHONE ENCOUNTER
Called patient - no answer left voicemail. CT just now got approved from when I ordered at end of July. Called to check and see how he was feeling and if still having the symptoms or not.

## 2022-08-25 ENCOUNTER — TELEPHONE (OUTPATIENT)
Dept: ONCOLOGY | Facility: CLINIC | Age: 27
End: 2022-08-25

## 2022-08-25 ENCOUNTER — OFFICE VISIT (OUTPATIENT)
Dept: ONCOLOGY | Facility: CLINIC | Age: 27
End: 2022-08-25

## 2022-08-25 ENCOUNTER — LAB (OUTPATIENT)
Dept: LAB | Facility: HOSPITAL | Age: 27
End: 2022-08-25

## 2022-08-25 VITALS
HEIGHT: 72 IN | HEART RATE: 101 BPM | BODY MASS INDEX: 26.01 KG/M2 | WEIGHT: 192 LBS | DIASTOLIC BLOOD PRESSURE: 99 MMHG | OXYGEN SATURATION: 99 % | TEMPERATURE: 98.9 F | SYSTOLIC BLOOD PRESSURE: 141 MMHG | RESPIRATION RATE: 12 BRPM

## 2022-08-25 DIAGNOSIS — D68.61 ANTIPHOSPHOLIPID SYNDROME: ICD-10-CM

## 2022-08-25 DIAGNOSIS — D68.61 ANTIPHOSPHOLIPID SYNDROME: Primary | ICD-10-CM

## 2022-08-25 LAB
ALBUMIN SERPL-MCNC: 4.4 G/DL (ref 3.5–5.2)
ALBUMIN/GLOB SERPL: 1.2 G/DL
ALP SERPL-CCNC: 81 U/L (ref 39–117)
ALT SERPL W P-5'-P-CCNC: 37 U/L (ref 1–41)
ANION GAP SERPL CALCULATED.3IONS-SCNC: 9.9 MMOL/L (ref 5–15)
APTT PPP: 79.4 SECONDS (ref 23.5–35.5)
AST SERPL-CCNC: 20 U/L (ref 1–40)
BASOPHILS # BLD AUTO: 0.1 10*3/MM3 (ref 0–0.2)
BASOPHILS NFR BLD AUTO: 1.1 % (ref 0–1.5)
BILIRUB SERPL-MCNC: 0.3 MG/DL (ref 0–1.2)
BUN SERPL-MCNC: 13 MG/DL (ref 6–20)
BUN/CREAT SERPL: 13.1 (ref 7–25)
CALCIUM SPEC-SCNC: 9.1 MG/DL (ref 8.6–10.5)
CHLORIDE SERPL-SCNC: 103 MMOL/L (ref 98–107)
CO2 SERPL-SCNC: 25.1 MMOL/L (ref 22–29)
CREAT SERPL-MCNC: 0.99 MG/DL (ref 0.76–1.27)
DEPRECATED RDW RBC AUTO: 40.6 FL (ref 37–54)
EGFRCR SERPLBLD CKD-EPI 2021: 108.4 ML/MIN/1.73
EOSINOPHIL # BLD AUTO: 0.3 10*3/MM3 (ref 0–0.4)
EOSINOPHIL NFR BLD AUTO: 3.2 % (ref 0.3–6.2)
ERYTHROCYTE [DISTWIDTH] IN BLOOD BY AUTOMATED COUNT: 13.2 % (ref 12.3–15.4)
GLOBULIN UR ELPH-MCNC: 3.8 GM/DL
GLUCOSE SERPL-MCNC: 93 MG/DL (ref 65–99)
HCT VFR BLD AUTO: 42.8 % (ref 37.5–51)
HGB BLD-MCNC: 14.3 G/DL (ref 13–17.7)
IMM GRANULOCYTES # BLD AUTO: 0.01 10*3/MM3 (ref 0–0.05)
IMM GRANULOCYTES NFR BLD AUTO: 0.1 % (ref 0–0.5)
INR PPP: 1.18 (ref 0.9–1.1)
LYMPHOCYTES # BLD AUTO: 3.33 10*3/MM3 (ref 0.7–3.1)
LYMPHOCYTES NFR BLD AUTO: 36 % (ref 19.6–45.3)
MCH RBC QN AUTO: 28.1 PG (ref 26.6–33)
MCHC RBC AUTO-ENTMCNC: 33.4 G/DL (ref 31.5–35.7)
MCV RBC AUTO: 84.1 FL (ref 79–97)
MONOCYTES # BLD AUTO: 0.67 10*3/MM3 (ref 0.1–0.9)
MONOCYTES NFR BLD AUTO: 7.3 % (ref 5–12)
NEUTROPHILS NFR BLD AUTO: 4.83 10*3/MM3 (ref 1.7–7)
NEUTROPHILS NFR BLD AUTO: 52.3 % (ref 42.7–76)
NRBC BLD AUTO-RTO: 0 /100 WBC (ref 0–0.2)
PLATELET # BLD AUTO: 105 10*3/MM3 (ref 140–450)
PMV BLD AUTO: 10.2 FL (ref 6–12)
POTASSIUM SERPL-SCNC: 3.8 MMOL/L (ref 3.5–5.2)
PROT SERPL-MCNC: 8.2 G/DL (ref 6–8.5)
PROTHROMBIN TIME: 15.4 SECONDS (ref 12.5–14.5)
RBC # BLD AUTO: 5.09 10*6/MM3 (ref 4.14–5.8)
SODIUM SERPL-SCNC: 138 MMOL/L (ref 136–145)
WBC NRBC COR # BLD: 9.24 10*3/MM3 (ref 3.4–10.8)

## 2022-08-25 PROCEDURE — 99214 OFFICE O/P EST MOD 30 MIN: CPT | Performed by: INTERNAL MEDICINE

## 2022-08-25 PROCEDURE — 85730 THROMBOPLASTIN TIME PARTIAL: CPT

## 2022-08-25 PROCEDURE — 80053 COMPREHEN METABOLIC PANEL: CPT

## 2022-08-25 PROCEDURE — 85610 PROTHROMBIN TIME: CPT

## 2022-08-25 PROCEDURE — 85025 COMPLETE CBC W/AUTO DIFF WBC: CPT

## 2022-08-25 PROCEDURE — 36415 COLL VENOUS BLD VENIPUNCTURE: CPT

## 2022-08-25 RX ORDER — BACLOFEN 5 MG/1
5 TABLET ORAL 2 TIMES DAILY
Qty: 60 TABLET | Refills: 5 | Status: SHIPPED | OUTPATIENT
Start: 2022-08-25 | End: 2023-03-19 | Stop reason: SDUPTHER

## 2022-08-25 NOTE — TELEPHONE ENCOUNTER
Call to Vargas, girlfriend answered and is at work.  Need phone number for Vargas.  Dr Clark wanting to clarify how Vargas is taking his coumadin because his INR is 1.18.  Left message with girlfriend to have Vargas return my call to discuss

## 2022-08-25 NOTE — TELEPHONE ENCOUNTER
Name of Physician notified: Barry Clark    Time Physician Notified:       []  Orders received    []  Protocol/Standing orders followed    [x]  No new orders

## 2022-08-25 NOTE — PROGRESS NOTES
Follow Up Office Visit      Date: 2022     Patient Name: Vargas Reyna  MRN: [id number]  : 1995  Referring Physician: Alysha Noe     Chief Complaint:  Follow-up for antiphospholipid syndrome and thrombocytopenia     History of Present Illness: Vargas Reyna is a pleasant 26 y.o. male the past medical history of bilateral lower extremity DVT who presents today for evaluation of DVT and thrombocytopenia. The patient states that he initially started to develop significant right-sided leg discomfort in July of this year.  He underwent an ultrasound which was notable for a right lower extremity DVT.  He denied any long car or plane rides.  Is not on any hormonal supplementation.  Denies any family history of blood clots.  He was started on Eliquis and completed almost 3 months of treatment when he started to develop left lower extremity pain and swelling.  He again underwent an ultrasound which was notable for a new DVT in the left lower extremity.  He has now been transitioned to Xarelto which she is tolerating well.  In regards to his thrombocytopenia, he has had multiple lab draws with platelet counts between 50-80K.  Per review of outside records, platelets have been clumped with these lab draws and have been adequate numbers during review of smear.  He denies any petechiae or any easy bleeding or bruising at this time.  Denies any family history of thrombocytopenia.     Interval History:  Presents to clinic for follow-up.  Tolerating warfarin well.  Following with anticoagulation clinic for medication adjustments.  Having improved strength in his left upper extremity.  Speech improving as well.  States that he plans on going back to work in the near future    Oncology History:    Oncology/Hematology History    No history exists.       Subjective      Review of Systems:   Constitutional: Negative for fevers, chills, or weight loss  Eyes: Negative for blurred vision or  discharge         Ear/Nose/Throat: Negative for difficulty swallowing, sore throat, LAD                                                       Respiratory: Negative for cough, SOA, wheezing                                                                                        Cardiovascular: Negative for chest pain or palpitations                                                                  Gastrointestinal: Negative for nausea, vomiting or diarrhea                                                                     Genitourinary: Negative for dysuria or hematuria                                                                                           Musculoskeletal: Negative for any joint pains or muscle aches                                                                        Neurologic: Negative for any weakness, headaches, dizziness                                                                         Hematologic: Negative for any easy bleeding or bruising                                                                                   Psychiatric: Negative for anxiety or depression                          Past Medical History/Past Surgical History/ Family History/ Social History: Reviewed by me and unchanged from my previous documentation done on June 2022.     Medications:     Current Outpatient Medications:   •  Baclofen 5 MG tablet, Take 5 mg by mouth 2 (Two) Times a Day. PRN, Disp: 60 tablet, Rfl: 5  •  warfarin (Coumadin) 1 MG tablet, Take 1 tablet by mouth Every Night., Disp: 30 tablet, Rfl: 0  •  warfarin (Coumadin) 4 MG tablet, Take 1 tablet by mouth Every Night., Disp: 30 tablet, Rfl: 0  •  warfarin (COUMADIN) 5 MG tablet, Take 1 tablet by mouth Daily. Alternate between 4mg and 5mg., Disp: 30 tablet, Rfl: 0    Allergies:   No Known Allergies    Objective     Physical Exam:  Vital Signs:   Vitals:    08/25/22 1328   BP: 141/99   Pulse: 101   Resp: 12   Temp: 98.9 °F (37.2 °C)   TempSrc:  "Temporal   SpO2: 99%   Weight: 87.1 kg (192 lb)   Height: 182.9 cm (72\")   PainSc: 0-No pain     Pain Score    08/25/22 1328   PainSc: 0-No pain     ECOG Performance Status: 1 - Symptomatic but completely ambulatory    Constitutional: NAD, ECOG 1  Eyes: PERRLA, scleral anicteric  ENT: No LAD, no thyromegaly  Respiratory: CTAB, no wheezing, rales, rhonchi  Cardiovascular: RRR, no murmurs, pulses 2+ bilaterally  Abdomen: soft, NT/ND, no HSM  Musculoskeletal: strength 5/5 bilaterally, no c/c/e  Neurologic: A&O x 3, CN II-XII intact grossly    Results Review:   No visits with results within 2 Week(s) from this visit.   Latest known visit with results is:   Anticoagulation Visit on 08/08/2022   Component Date Value Ref Range Status   • INR 08/08/2022 4.00 (A) 0.9 - 1.1 Final       No results found.    Assessment / Plan      Assessment/Plan:   Antiphospholipid syndrome  -Diagnosed as an outpatient and followed by me  -Status post multiple thrombotic events including DVTs, renal arterial stenosis and now CVA  -Per my last outpatient note, we discussed transitioning to warfarin vs continuing Xarelto 15 mg twice daily.  Patient elected to continue Xarelto 15 mg twice daily as he was uncertain whether he could be compliant with the dietary and lab restrictions of warfarin  -Per patient, he was taking Xarelto 20 mg daily at home prior to the event  -Right lower extremity duplex notable for DVT and superficial thrombophlebitis.   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   -Started warfarin on 5/30/2022.  Goal INR of 2-3  -Dosing managed by anticoagulation clinic.  Repeat INR pending today  -Repeat VALERI with cardiology in July 2022 showing complete clot resolution  -We will need to continue indefinite warfarin     Acute right-sided CVA  -Likely secondary to thrombus on his aortic valve from his antiphospholipid syndrome  -Significant improvement of his speech and muscle " strength on his left side  -Continue follow-up with neurology  Aortic valve thrombosis  -Secondary to antiphospholipid syndrome  -Repeat VALERI in July 2022 showing clot resolution     Right lower extremity DVT  -Continue indefinite warfarin as above     Bilateral renal arterial stenosis  -Noted previously during hospitalization in [place]  -Continue indefinite warfarin as above     Thrombocytopenia (HCC) (Primary)  -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  No indication for platelet transfusion or bone marrow biopsy     Muscle spasm  -Patient with spasms in his left upper extremity after doing his physical therapy exercises  -Has previously improved with Bactrim.  Will refill today       Follow Up:   Follow-up in 3 months     Barry Clark MD  Hematology and Oncology     Please note that portions of this note may have been completed with a voice recognition program. Efforts were made to edit the dictations, but occasionally words are mistranscribed.

## 2022-08-25 NOTE — TELEPHONE ENCOUNTER
Critical Test Results      MD: Dr. Clark     Date: 8/25/22     Critical test result: PTT of 79.4    Time results received: 14:49

## 2022-09-07 ENCOUNTER — TELEPHONE (OUTPATIENT)
Dept: INTERNAL MEDICINE | Facility: CLINIC | Age: 27
End: 2022-09-07

## 2022-09-08 ENCOUNTER — ANTICOAGULATION VISIT (OUTPATIENT)
Dept: INTERNAL MEDICINE | Facility: CLINIC | Age: 27
End: 2022-09-08

## 2022-09-08 DIAGNOSIS — I63.511 ACUTE ISCHEMIC RIGHT MCA STROKE: Primary | ICD-10-CM

## 2022-09-08 LAB — INR PPP: 1.8 (ref 0.9–1.1)

## 2022-09-08 PROCEDURE — 85610 PROTHROMBIN TIME: CPT | Performed by: INTERNAL MEDICINE

## 2022-09-08 PROCEDURE — 36416 COLLJ CAPILLARY BLOOD SPEC: CPT | Performed by: INTERNAL MEDICINE

## 2022-09-09 ENCOUNTER — CALL CENTER PROGRAMS (OUTPATIENT)
Dept: CALL CENTER | Facility: HOSPITAL | Age: 27
End: 2022-09-09

## 2022-09-09 NOTE — OUTREACH NOTE
Stroke Bong Survey    Flowsheet Row Responses   Facility patient discharged from? [place]   Attempt successful Yes   Call start time 1421   Person spoke with today (if not patient) and relationship Patient   Call end time 1434   Patient location 30 days post discharge if known Home   Was the patient readmitted within 30 days of discharge? No   Could you live alone without any help from another person? Yes  [Patient independent with bathing, dressing except for difficulty with tying shoes. ]   Can you do everything that you were doing right before your stroke even if slower and not as much? Yes  [Patient reports that he is back to work. ]   Are you completely back to the way you were right before your stroke? No  [Patient reports residual numbness to fingers to left hand. ]   Can you walk from one room to another without help from another person? Yes  [Walks independently]   Can the patient sit up in bed without any help? Yes   Call Center Morris Score 1   Morris score call completed Yes   Comments Patient reports that he continues to have difficulty with fine motor skills to his left hand. He states that he is no longer in therapy, but continues to do exercises taught.           FRANCISCO MIMS - Registered Nurse

## 2022-09-18 NOTE — PROGRESS NOTES
Follow Up Office Visit      Encounter Date: 2022   Patient Name: Vargas Reyna  : 1995   MRN: [id number]   PCP: Dustin Patel DO    Chief Complaint:    Chief Complaint   Patient presents with   • Follow-up   • Stroke       History of Present Illness:  Review of previous office visit--Vargas Reyna is a 27 y.o. male  PMH of antiphospholipid syndrome on Xarelto, previously failed Eliquis, who declined Coumadin in the past, renal infarcts, and multiple DVTs who is here today to establish care.  He was admitted to [facility] on 2022 with a right MCA stroke.  He was noted to have thrombus in the M2 segment of his right MCA.  He underwent an unsuccessful R MCA M2 thrombectomy. 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 Heme-onc was consulted.  Patient was started on Coumadin by Dr. Clark with heme-onc.  NIH at discharge improved to 3.  He went to rehab at [facility] for 2 weeks.  He followed up with Dr. Clark at the end of .  Patient has been compliant with Coumadin and INR checks.  He is scheduled for a repeat VALERI on  to evaluate for resolution of aortic valve mass.     Since discharge, patient has been doing well.  He has improved strength in his left upper extremity and left lower extremity.  He is walking with a quad cane as needed.  He is continuing to have intermittent spasms in his left lower extremity are worse at night.  Patient states they resolve when he takes baclofen.  He reports that he supposed to start outpatient PT/OT with [facility] but has not heard back from them.  Would prefer to do PT and OT in [place] closer to his home.       At today's visit--Patient reports he feels like he is doing very well. He only complains of having left arm and specifically hand weakness and incoordination. He went to [facility] after discharge for 2 weeks and found this to be helpful. He was recommended to do outpatient therapy but reports it has been  too hard to coordinate. He has been doing stretches and exercises at home. He reports no new or worsening symptoms today. He is able to walk into appointment without any assistive devices. Since his last appointment he has had repeat VALERI with Dr. Navarro and it did show resolution of aortic valve mass. He has no further appointments with cardiology at this time. He has continued to be followed by Dr. Clark with Hem-Onc. He reports he will have appointments every 3-4 months at this time. He is consistent with his medications and goes to the INR clinic with no issue. He understands the importance of compliance with this in preventing future stroke. He does report that he does not wish to continue taking Lipitor. He has not had side effects but felt that he was on too much medications and just wants to take Coumadin and Baclofen. With his LDL being 78 I do not see an issue with this.        Stroke Risk Factors: hypercoagulable state    Subjective        I have reviewed and the following portions of the patient's history were updated as appropriate: past family history, past medical history, past social history, past surgical history and problem list.    Medications:     Current Outpatient Medications:   •  Baclofen 5 MG tablet, Take 5 mg by mouth 2 (Two) Times a Day. PRN, Disp: 60 tablet, Rfl: 5  •  warfarin (Coumadin) 1 MG tablet, Take 1 tablet by mouth Every Night., Disp: 30 tablet, Rfl: 0  •  warfarin (Coumadin) 4 MG tablet, Take 1 tablet by mouth Every Night., Disp: 30 tablet, Rfl: 0  •  warfarin (COUMADIN) 5 MG tablet, Take 1 tablet by mouth Daily. Alternate between 4mg and 5mg., Disp: 30 tablet, Rfl: 0    Allergies:   No Known Allergies    Objective     Physical Exam:   Vital Signs:   Vitals:    09/19/22 1008   BP: 128/72   Pulse: 82   Temp: 98 °F (36.7 °C)   SpO2: 98%   Weight: 91.7 kg (202 lb 3.2 oz)     Body mass index is 27.42 kg/m².    Physical Exam  Vitals and nursing note reviewed.   Constitutional:        General: He is awake. He is not in acute distress.     Appearance: Normal appearance. He is normal weight. He is not ill-appearing.   HENT:      Head: Normocephalic and atraumatic.      Nose: Nose normal.      Mouth/Throat:      Mouth: Mucous membranes are moist.   Eyes:      General: Lids are normal.      Extraocular Movements: Extraocular movements intact.      Pupils: Pupils are equal, round, and reactive to light.   Cardiovascular:      Rate and Rhythm: Normal rate and regular rhythm.      Pulses: Normal pulses.   Pulmonary:      Effort: Pulmonary effort is normal. No respiratory distress.   Skin:     General: Skin is warm and dry.   Neurological:      Mental Status: He is alert and oriented to person, place, and time.      Motor: Weakness present.      Coordination: Coordination abnormal.      Comments: LUE and left hand    Psychiatric:         Mood and Affect: Mood normal.         Speech: Speech normal.         Behavior: Behavior normal.       Neurological Exam  Mental Status  Awake and alert. Oriented to person, place, time and situation. Oriented to person, place, and time. Speech is normal. Language is fluent with no aphasia. Attention and concentration are normal. Fund of knowledge is appropriate for level of education.    Cranial Nerves  CN II: Visual fields full to confrontation.  CN III, IV, VI: Extraocular movements intact bilaterally. Normal lids and orbits bilaterally. Pupils equal round and reactive to light bilaterally.  CN V: Facial sensation is normal.  CN VII: Full and symmetric facial movement.  CN VIII: Hearing is normal to speech .  CN XI: Shoulder shrug strength is normal.  CN XII: Tongue midline without atrophy or fasciculations.    Motor  Normal muscle bulk throughout. No fasciculations present. Normal muscle tone. Strength is 5/5 in all four extremities except as noted.  No drift any extremity. Left hand  mild weakness .    Sensory  Light touch is normal in upper and lower  extremities. Pinprick is normal in upper and lower extremities.     Coordination    Has difficulty with fine motor movements on left hand .    Gait  Casual gait is normal including stance, stride, and arm swing.       Modified [place] Score: 1        0  No Symptoms    1 No significant disability. Able to carry out all usual activities, despite some symptoms.    2 Slight disability. Able to look after own affairs without assistance, but unable to carry out all previous activities.    3 Moderate disability. Requires some help, but able to walk unassisted.    4 Moderately severe disability. Unable to attend to own bodily needs without assistance, and unable to walk unassisted.    5 Severe disability. Requires constant nursing care and attention, bedridden, incontinent.    6 Dead      PHQ-9 Depression Screening  Little interest or pleasure in doing things? 0-->not at all   Feeling down, depressed, or hopeless? 0-->not at all   Trouble falling or staying asleep, or sleeping too much?     Feeling tired or having little energy?     Poor appetite or overeating?     Feeling bad about yourself - or that you are a failure or have let yourself or your family down?     Trouble concentrating on things, such as reading the newspaper or watching television?     Moving or speaking so slowly that other people could have noticed? Or the opposite - being so fidgety or restless that you have been moving around a lot more than usual?     Thoughts that you would be better off dead, or of hurting yourself in some way?     PHQ-9 Total Score 0   If you checked off any problems, how difficult have these problems made it for you to do your work, take care of things at home, or get along with other people?           Assessment / Plan      Assessment/Plan:     1. History of ischemic right MCA stroke (Primary)  -Continue Coumadin and INR clinic   -Patient has self discontinued Lipitor. LDL is 78 so I feel this is reasonable at this time as long as  he remains compliant with OAC  -Blood pressure goals <130/80  -Serum glucose <140  -no longer requiring PT/OT per patient  -Continue baclofen for left upper extremity spasms. Offered eval in Botox clinic with Dr. Birch and patient is not interested      2. Antiphospholipid syndrome (HCC)  -Followed by Dr. Clark with heme-onc. Keep all future appointments   -Continue Coumadin     3. Aortic valve vegetation  -resolved        Stroke education including lifestyle/risk factor modifications, medication and medical regimen compliance, and s/s of stroke and when to seek emergent care discussed.  Patient family verbalized understanding.      Stroke Plan:               Stroke Education              Blood Pressure control to < 130/80              Goal LDL <70-recommend high dose statins              Serum Glucose < 140              Call 911 for stroke any stroke symptoms     Follow Up:   Return in about 6 months (around 3/19/2023).    TRE Ruiz  [facility]

## 2022-09-19 ENCOUNTER — OFFICE VISIT (OUTPATIENT)
Dept: NEUROLOGY | Facility: CLINIC | Age: 27
End: 2022-09-19

## 2022-09-19 VITALS
DIASTOLIC BLOOD PRESSURE: 72 MMHG | WEIGHT: 202.2 LBS | TEMPERATURE: 98 F | BODY MASS INDEX: 27.42 KG/M2 | SYSTOLIC BLOOD PRESSURE: 128 MMHG | HEART RATE: 82 BPM | OXYGEN SATURATION: 98 %

## 2022-09-19 DIAGNOSIS — I33.0 AORTIC VALVE VEGETATION: ICD-10-CM

## 2022-09-19 DIAGNOSIS — Z86.73 HISTORY OF STROKE: Primary | ICD-10-CM

## 2022-09-19 DIAGNOSIS — D68.61 ANTIPHOSPHOLIPID SYNDROME: ICD-10-CM

## 2022-09-19 PROCEDURE — 99214 OFFICE O/P EST MOD 30 MIN: CPT | Performed by: NURSE PRACTITIONER

## 2022-09-23 ENCOUNTER — ANTICOAGULATION VISIT (OUTPATIENT)
Dept: INTERNAL MEDICINE | Facility: CLINIC | Age: 27
End: 2022-09-23

## 2022-09-23 DIAGNOSIS — I82.4Z3 ACUTE DEEP VEIN THROMBOSIS (DVT) OF DISTAL VEIN OF BOTH LOWER EXTREMITIES: ICD-10-CM

## 2022-09-23 DIAGNOSIS — I63.511 ACUTE ISCHEMIC RIGHT MCA STROKE: ICD-10-CM

## 2022-09-23 DIAGNOSIS — D68.61 ANTIPHOSPHOLIPID SYNDROME: ICD-10-CM

## 2022-09-23 LAB — INR PPP: 1.8 (ref 0.9–1.1)

## 2022-09-23 PROCEDURE — 85610 PROTHROMBIN TIME: CPT | Performed by: INTERNAL MEDICINE

## 2022-09-23 PROCEDURE — 36416 COLLJ CAPILLARY BLOOD SPEC: CPT | Performed by: INTERNAL MEDICINE

## 2022-09-23 RX ORDER — WARFARIN SODIUM 5 MG/1
5 TABLET ORAL
Qty: 30 TABLET | Refills: 0 | Status: SHIPPED | OUTPATIENT
Start: 2022-09-23 | End: 2022-12-02 | Stop reason: SDUPTHER

## 2022-09-23 RX ORDER — WARFARIN SODIUM 1 MG/1
1 TABLET ORAL NIGHTLY
Qty: 30 TABLET | Refills: 0 | Status: SHIPPED | OUTPATIENT
Start: 2022-09-23 | End: 2022-12-02 | Stop reason: SDUPTHER

## 2022-09-23 RX ORDER — WARFARIN SODIUM 4 MG/1
4 TABLET ORAL NIGHTLY
Qty: 30 TABLET | Refills: 0 | Status: SHIPPED | OUTPATIENT
Start: 2022-09-23 | End: 2023-01-13 | Stop reason: SDUPTHER

## 2022-09-23 NOTE — TELEPHONE ENCOUNTER
Rx Refill Note  Requested Prescriptions     Pending Prescriptions Disp Refills   • warfarin (Coumadin) 4 MG tablet 30 tablet 0     Sig: Take 1 tablet by mouth Every Night.   • warfarin (COUMADIN) 5 MG tablet 30 tablet 0     Sig: Take 1 tablet by mouth Daily. Alternate between 4mg and 5mg.   • warfarin (Coumadin) 1 MG tablet 30 tablet 0     Sig: Take 1 tablet by mouth Every Night.      Last office visit with prescribing clinician: 6/22/2022      Next office visit with prescribing clinician: 10/5/2022            Vika Fuentes MA  09/23/22, 15:49 EDT

## 2022-10-07 ENCOUNTER — TELEPHONE (OUTPATIENT)
Dept: INTERNAL MEDICINE | Facility: CLINIC | Age: 27
End: 2022-10-07

## 2022-10-10 ENCOUNTER — ANTICOAGULATION VISIT (OUTPATIENT)
Dept: INTERNAL MEDICINE | Facility: CLINIC | Age: 27
End: 2022-10-10

## 2022-10-10 LAB — INR PPP: 1.6 (ref 0.9–1.1)

## 2022-10-10 PROCEDURE — 85610 PROTHROMBIN TIME: CPT | Performed by: INTERNAL MEDICINE

## 2022-10-10 PROCEDURE — 36416 COLLJ CAPILLARY BLOOD SPEC: CPT | Performed by: INTERNAL MEDICINE

## 2022-10-17 ENCOUNTER — ANTICOAGULATION VISIT (OUTPATIENT)
Dept: INTERNAL MEDICINE | Facility: CLINIC | Age: 27
End: 2022-10-17

## 2022-10-17 DIAGNOSIS — I63.511 ACUTE ISCHEMIC RIGHT MCA STROKE: Primary | ICD-10-CM

## 2022-10-17 LAB — INR PPP: 2.1 (ref 0.9–1.1)

## 2022-10-17 PROCEDURE — 36416 COLLJ CAPILLARY BLOOD SPEC: CPT | Performed by: INTERNAL MEDICINE

## 2022-10-17 PROCEDURE — 85610 PROTHROMBIN TIME: CPT | Performed by: INTERNAL MEDICINE

## 2022-10-24 ENCOUNTER — ANTICOAGULATION VISIT (OUTPATIENT)
Dept: INTERNAL MEDICINE | Facility: CLINIC | Age: 27
End: 2022-10-24

## 2022-10-24 LAB — INR PPP: 2.3 (ref 0.9–1.1)

## 2022-10-24 PROCEDURE — 85610 PROTHROMBIN TIME: CPT | Performed by: INTERNAL MEDICINE

## 2022-10-24 PROCEDURE — 36416 COLLJ CAPILLARY BLOOD SPEC: CPT | Performed by: INTERNAL MEDICINE

## 2022-11-08 ENCOUNTER — ANTICOAGULATION VISIT (OUTPATIENT)
Dept: INTERNAL MEDICINE | Facility: CLINIC | Age: 27
End: 2022-11-08

## 2022-11-08 LAB — INR PPP: 1.7 (ref 0.9–1.1)

## 2022-11-08 PROCEDURE — 85610 PROTHROMBIN TIME: CPT | Performed by: INTERNAL MEDICINE

## 2022-11-08 PROCEDURE — 36416 COLLJ CAPILLARY BLOOD SPEC: CPT | Performed by: INTERNAL MEDICINE

## 2022-11-22 ENCOUNTER — ANTICOAGULATION VISIT (OUTPATIENT)
Dept: INTERNAL MEDICINE | Facility: CLINIC | Age: 27
End: 2022-11-22

## 2022-11-22 LAB — INR PPP: 2.2 (ref 0.9–1.1)

## 2022-11-22 PROCEDURE — 85610 PROTHROMBIN TIME: CPT | Performed by: INTERNAL MEDICINE

## 2022-11-22 PROCEDURE — 36416 COLLJ CAPILLARY BLOOD SPEC: CPT | Performed by: INTERNAL MEDICINE

## 2022-12-02 DIAGNOSIS — I63.511 ACUTE ISCHEMIC RIGHT MCA STROKE: ICD-10-CM

## 2022-12-02 DIAGNOSIS — I82.4Z3 ACUTE DEEP VEIN THROMBOSIS (DVT) OF DISTAL VEIN OF BOTH LOWER EXTREMITIES: ICD-10-CM

## 2022-12-02 DIAGNOSIS — D68.61 ANTIPHOSPHOLIPID SYNDROME: ICD-10-CM

## 2022-12-05 RX ORDER — WARFARIN SODIUM 5 MG/1
5 TABLET ORAL
Qty: 30 TABLET | Refills: 0 | Status: SHIPPED | OUTPATIENT
Start: 2022-12-05 | End: 2023-01-27 | Stop reason: SDUPTHER

## 2022-12-05 RX ORDER — WARFARIN SODIUM 1 MG/1
1 TABLET ORAL NIGHTLY
Qty: 30 TABLET | Refills: 0 | Status: SHIPPED | OUTPATIENT
Start: 2022-12-05 | End: 2023-01-27 | Stop reason: SDUPTHER

## 2022-12-06 ENCOUNTER — OFFICE VISIT (OUTPATIENT)
Dept: INTERNAL MEDICINE | Facility: CLINIC | Age: 27
End: 2022-12-06

## 2022-12-06 VITALS
BODY MASS INDEX: 26.68 KG/M2 | HEART RATE: 90 BPM | HEIGHT: 72 IN | WEIGHT: 197 LBS | TEMPERATURE: 97.8 F | DIASTOLIC BLOOD PRESSURE: 78 MMHG | OXYGEN SATURATION: 100 % | SYSTOLIC BLOOD PRESSURE: 122 MMHG

## 2022-12-06 DIAGNOSIS — I33.0 AORTIC VALVE VEGETATION: ICD-10-CM

## 2022-12-06 DIAGNOSIS — D68.61 ANTIPHOSPHOLIPID SYNDROME: Primary | ICD-10-CM

## 2022-12-06 DIAGNOSIS — K21.9 GASTROESOPHAGEAL REFLUX DISEASE WITHOUT ESOPHAGITIS: ICD-10-CM

## 2022-12-06 DIAGNOSIS — I63.511 ACUTE ISCHEMIC RIGHT MCA STROKE: ICD-10-CM

## 2022-12-06 PROCEDURE — 99214 OFFICE O/P EST MOD 30 MIN: CPT | Performed by: INTERNAL MEDICINE

## 2022-12-06 RX ORDER — FAMOTIDINE 20 MG/1
20 TABLET, FILM COATED ORAL 2 TIMES DAILY PRN
Qty: 60 TABLET | Refills: 2 | Status: SHIPPED | OUTPATIENT
Start: 2022-12-06 | End: 2023-02-07

## 2022-12-06 NOTE — PROGRESS NOTES
Chief Complaint   Patient presents with   • Stroke     Follow up/progress   • Heartburn     Stomach acid has been more of a problem       Subjective     History of Present Illness   Vargas Reyna is a 27 y.o. male presenting for follow up on stroke (earlier this year).  Patient says that he was not able to afford and had poor insurance coverage for physical therapy.  As a result, he has been trying exercises for his left hand strength with online research.  He still has poor coordination but strength seems to be improving slightly.  His left lower extremity has reasonably well-controlled symptoms.  He remains compliant with taking his warfarin regularly but it is becoming fatiguing to come for INR checks frequently.    He did follow-up with cardiology and states that he had a VALERI procedure which ruled out vegetations.    Has been having GERD issues over the last 2 weeks, bothering him day and night.     The following portions of the patient's history were reviewed and updated as appropriate: allergies, current medications, past family history, past medical history, past social history, past surgical history and problem list.    Review of Systems   Constitutional: Negative for chills, fatigue and fever.   HENT: Negative for congestion, ear pain, rhinorrhea, sinus pressure and sore throat.    Eyes: Negative for visual disturbance.   Respiratory: Negative for cough, chest tightness, shortness of breath and wheezing.    Cardiovascular: Negative for chest pain, palpitations and leg swelling.   Gastrointestinal: Negative for abdominal pain, blood in stool, constipation, diarrhea, nausea and vomiting.   Endocrine: Negative for polydipsia and polyuria.   Genitourinary: Negative for dysuria and hematuria.   Musculoskeletal: Negative for arthralgias and back pain.   Skin: Negative for rash.   Neurological: Negative for dizziness, light-headedness, numbness and headaches.   Psychiatric/Behavioral: Negative for  "dysphoric mood and sleep disturbance. The patient is not nervous/anxious.        No Known Allergies    Past Medical History:   Diagnosis Date   • Antiphospholipid syndrome (HCC)    • Deep vein thrombosis (HCC)    • GERD (gastroesophageal reflux disease)    • Stroke (HCC)        Social History     Socioeconomic History   • Marital status: Single   Tobacco Use   • Smoking status: Former     Packs/day: 0.50     Years: 3.00     Pack years: 1.50     Types: Cigarettes     Quit date: 2021     Years since quittin.3   • Smokeless tobacco: Former   Vaping Use   • Vaping Use: Former   • Substances: years ago   Substance and Sexual Activity   • Alcohol use: Not Currently     Comment: social   • Drug use: Yes     Types: Marijuana     Comment: few times daily   • Sexual activity: Defer        Past Surgical History:   Procedure Laterality Date   • FRACTURE SURGERY     • INTERVENTIONAL RADIOLOGY PROCEDURE Bilateral 2022    Procedure: CAROTID CEREBRAL ANGIOGRAM BILATERAL;  Surgeon: Juventino Headley MD;  Location: [facility] INVASIVE LOCATION;  Service: Interventional Radiology;  Laterality: Bilateral;   • LEG SURGERY Right     spiral fracture repair three years ago        Family History   Problem Relation Age of Onset   • No Known Problems Mother          Current Outpatient Medications:   •  Baclofen 5 MG tablet, Take 5 mg by mouth 2 (Two) Times a Day. PRN, Disp: 60 tablet, Rfl: 5  •  warfarin (Coumadin) 1 MG tablet, Take 1 tablet by mouth Every Night., Disp: 30 tablet, Rfl: 0  •  warfarin (Coumadin) 4 MG tablet, Take 1 tablet by mouth Every Night., Disp: 30 tablet, Rfl: 0  •  warfarin (COUMADIN) 5 MG tablet, Take 1 tablet by mouth Daily. Alternate between 4mg and 5mg., Disp: 30 tablet, Rfl: 0  •  famotidine (Pepcid) 20 MG tablet, Take 1 tablet by mouth 2 (Two) Times a Day As Needed for Heartburn., Disp: 60 tablet, Rfl: 2    Objective   /78   Pulse 90   Temp 97.8 °F (36.6 °C)   Ht 182.9 cm (72\")   Wt 89.4 " kg (197 lb)   SpO2 100%   BMI 26.72 kg/m²     Physical Exam  Vitals and nursing note reviewed.   Constitutional:       Appearance: Normal appearance. He is well-developed.   HENT:      Head: Normocephalic and atraumatic.   Eyes:      Extraocular Movements: Extraocular movements intact.      Conjunctiva/sclera: Conjunctivae normal.   Pulmonary:      Effort: Pulmonary effort is normal.   Musculoskeletal:      Cervical back: Normal range of motion and neck supple.   Skin:     General: Skin is warm and dry.      Findings: No rash.   Neurological:      General: No focal deficit present.      Mental Status: He is alert and oriented to person, place, and time.   Psychiatric:         Mood and Affect: Mood normal.         Behavior: Behavior normal.         Assessment & Plan   Diagnoses and all orders for this visit:    1. Antiphospholipid syndrome (HCC) (Primary)    2. Acute ischemic right MCA stroke (HCC)    3. Aortic valve vegetation    4. Gastroesophageal reflux disease without esophagitis  -     famotidine (Pepcid) 20 MG tablet; Take 1 tablet by mouth 2 (Two) Times a Day As Needed for Heartburn.  Dispense: 60 tablet; Refill: 2          Discussion Summary:  Patient is a 27 y.o. male presenting for follow up.    Antiphospholipid syndrome  - Continue warfarin therapy with goal INR between 2 and 3  - Follow-up with hematology as scheduled.    Right MCA stroke  - No worsening symptoms.  Left upper extremity has some mild contractures but patient is doing well with doing exercises on his own.    Aortic valve vegetation  - Ruled out after VALERI with cardiology.    GERD  - New symptom.  Patient may try taking Pepcid 20 mg twice daily for couple of weeks and then take as needed.    Follow up:  No follow-ups on file.

## 2022-12-20 ENCOUNTER — ANTICOAGULATION VISIT (OUTPATIENT)
Dept: INTERNAL MEDICINE | Facility: CLINIC | Age: 27
End: 2022-12-20

## 2022-12-20 DIAGNOSIS — I33.0 AORTIC VALVE VEGETATION: Primary | ICD-10-CM

## 2022-12-20 DIAGNOSIS — D69.6 THROMBOCYTOPENIA: ICD-10-CM

## 2022-12-20 LAB — INR PPP: 1.4 (ref 0.9–1.1)

## 2022-12-20 PROCEDURE — 85610 PROTHROMBIN TIME: CPT | Performed by: INTERNAL MEDICINE

## 2022-12-20 PROCEDURE — 36416 COLLJ CAPILLARY BLOOD SPEC: CPT | Performed by: INTERNAL MEDICINE

## 2022-12-29 ENCOUNTER — ANTICOAGULATION VISIT (OUTPATIENT)
Dept: INTERNAL MEDICINE | Facility: CLINIC | Age: 27
End: 2022-12-29

## 2022-12-29 ENCOUNTER — TELEPHONE (OUTPATIENT)
Dept: INTERNAL MEDICINE | Facility: CLINIC | Age: 27
End: 2022-12-29

## 2022-12-29 DIAGNOSIS — D69.6 THROMBOCYTOPENIA: Primary | ICD-10-CM

## 2022-12-29 DIAGNOSIS — I33.0 AORTIC VALVE VEGETATION: ICD-10-CM

## 2022-12-29 LAB — INR PPP: 1.9 (ref 0.9–1.1)

## 2022-12-29 PROCEDURE — 85610 PROTHROMBIN TIME: CPT | Performed by: INTERNAL MEDICINE

## 2022-12-29 PROCEDURE — 36416 COLLJ CAPILLARY BLOOD SPEC: CPT | Performed by: INTERNAL MEDICINE

## 2023-01-12 ENCOUNTER — TELEPHONE (OUTPATIENT)
Dept: INTERNAL MEDICINE | Facility: CLINIC | Age: 28
End: 2023-01-12
Payer: COMMERCIAL

## 2023-01-12 NOTE — TELEPHONE ENCOUNTER
"Called pt to remind him it was time to check his INR but a women not on the verbal release answered, so iasked if the pt could call back at the office to speak with us.        HUB MAY DELIVER \" TIME TO GET INR CHECKED\"  "

## 2023-01-13 ENCOUNTER — ANTICOAGULATION VISIT (OUTPATIENT)
Dept: INTERNAL MEDICINE | Facility: CLINIC | Age: 28
End: 2023-01-13
Payer: COMMERCIAL

## 2023-01-13 DIAGNOSIS — D68.61 ANTIPHOSPHOLIPID SYNDROME: ICD-10-CM

## 2023-01-13 DIAGNOSIS — I63.511 ACUTE ISCHEMIC RIGHT MCA STROKE: ICD-10-CM

## 2023-01-13 LAB — INR PPP: 1.6 (ref 0.9–1.1)

## 2023-01-13 PROCEDURE — 85610 PROTHROMBIN TIME: CPT | Performed by: INTERNAL MEDICINE

## 2023-01-13 PROCEDURE — 36416 COLLJ CAPILLARY BLOOD SPEC: CPT | Performed by: INTERNAL MEDICINE

## 2023-01-13 RX ORDER — WARFARIN SODIUM 3 MG/1
3 TABLET ORAL NIGHTLY
Qty: 30 TABLET | Refills: 0 | Status: SHIPPED | OUTPATIENT
Start: 2023-01-13 | End: 2023-02-12

## 2023-01-25 ENCOUNTER — TELEPHONE (OUTPATIENT)
Dept: INTERNAL MEDICINE | Facility: CLINIC | Age: 28
End: 2023-01-25
Payer: COMMERCIAL

## 2023-01-27 ENCOUNTER — ANTICOAGULATION VISIT (OUTPATIENT)
Dept: INTERNAL MEDICINE | Facility: CLINIC | Age: 28
End: 2023-01-27
Payer: COMMERCIAL

## 2023-01-27 DIAGNOSIS — D68.61 ANTIPHOSPHOLIPID SYNDROME: ICD-10-CM

## 2023-01-27 DIAGNOSIS — I63.511 ACUTE ISCHEMIC RIGHT MCA STROKE: ICD-10-CM

## 2023-01-27 DIAGNOSIS — I63.511 ACUTE ISCHEMIC RIGHT MCA STROKE: Primary | ICD-10-CM

## 2023-01-27 DIAGNOSIS — I82.4Z3 ACUTE DEEP VEIN THROMBOSIS (DVT) OF DISTAL VEIN OF BOTH LOWER EXTREMITIES: ICD-10-CM

## 2023-01-27 LAB — INR PPP: 2.4 (ref 0.9–1.1)

## 2023-01-27 PROCEDURE — 85610 PROTHROMBIN TIME: CPT | Performed by: INTERNAL MEDICINE

## 2023-01-27 PROCEDURE — 36416 COLLJ CAPILLARY BLOOD SPEC: CPT | Performed by: INTERNAL MEDICINE

## 2023-01-27 RX ORDER — WARFARIN SODIUM 1 MG/1
1 TABLET ORAL NIGHTLY
Qty: 30 TABLET | Refills: 0 | Status: SHIPPED | OUTPATIENT
Start: 2023-01-27 | End: 2023-03-04 | Stop reason: SDUPTHER

## 2023-01-27 RX ORDER — WARFARIN SODIUM 5 MG/1
5 TABLET ORAL
Qty: 30 TABLET | Refills: 0 | Status: SHIPPED | OUTPATIENT
Start: 2023-01-27 | End: 2023-03-04 | Stop reason: SDUPTHER

## 2023-02-07 ENCOUNTER — OFFICE VISIT (OUTPATIENT)
Dept: INTERNAL MEDICINE | Facility: CLINIC | Age: 28
End: 2023-02-07
Payer: COMMERCIAL

## 2023-02-07 ENCOUNTER — ANTICOAGULATION VISIT (OUTPATIENT)
Dept: INTERNAL MEDICINE | Facility: CLINIC | Age: 28
End: 2023-02-07
Payer: COMMERCIAL

## 2023-02-07 VITALS
BODY MASS INDEX: 26.72 KG/M2 | TEMPERATURE: 102.2 F | HEART RATE: 107 BPM | SYSTOLIC BLOOD PRESSURE: 118 MMHG | DIASTOLIC BLOOD PRESSURE: 74 MMHG | HEIGHT: 72 IN | OXYGEN SATURATION: 98 %

## 2023-02-07 DIAGNOSIS — U07.1 COVID-19: Primary | ICD-10-CM

## 2023-02-07 LAB
EXPIRATION DATE: ABNORMAL
FLUAV AG UPPER RESP QL IA.RAPID: NOT DETECTED
FLUBV AG UPPER RESP QL IA.RAPID: NOT DETECTED
INR PPP: 2.8 (ref 0.9–1.1)
INTERNAL CONTROL: ABNORMAL
Lab: ABNORMAL
SARS-COV-2 AG UPPER RESP QL IA.RAPID: DETECTED

## 2023-02-07 PROCEDURE — 36416 COLLJ CAPILLARY BLOOD SPEC: CPT | Performed by: INTERNAL MEDICINE

## 2023-02-07 PROCEDURE — 85610 PROTHROMBIN TIME: CPT | Performed by: INTERNAL MEDICINE

## 2023-02-07 PROCEDURE — 99213 OFFICE O/P EST LOW 20 MIN: CPT | Performed by: NURSE PRACTITIONER

## 2023-02-07 PROCEDURE — 87428 SARSCOV & INF VIR A&B AG IA: CPT | Performed by: NURSE PRACTITIONER

## 2023-02-07 RX ORDER — DEXTROMETHORPHAN HYDROBROMIDE AND PROMETHAZINE HYDROCHLORIDE 15; 6.25 MG/5ML; MG/5ML
5 SYRUP ORAL 4 TIMES DAILY PRN
Qty: 240 ML | Refills: 0 | Status: SHIPPED | OUTPATIENT
Start: 2023-02-07 | End: 2023-03-20

## 2023-02-07 NOTE — PROGRESS NOTES
"  Office Visit      Patient Name: Vargas Reyna  : 1995   MRN: [id number]   Care Team: Patient Care Team:  Dustin Patel DO as PCP - General (Internal Medicine)    Chief Complaint  Generalized Body Aches (Vomiting yesterday ), Fever, and Cough    Subjective     Subjective      Vargas Reyna presents to [facility] for URI symptoms.   Symptoms started 1 day ago.   Endorses fever, cough, nausea, vomiting, body aches, congestion, and shortness of breath.    Denies rash, bothersome night symptoms, and chest pain.    He has not tried anything for the symptoms.   He does suffer from antiphospholipid syndrome, on chronic warfarin. INR today therapeutic.     Objective     Objective   Vital Signs:   /74   Pulse 107   Temp (!) 102.2 °F (39 °C)   Ht 182.9 cm (72\")   SpO2 98%   BMI 26.72 kg/m²     Physical Exam  Vitals and nursing note reviewed.   Constitutional:       General: He is not in acute distress.     Appearance: Normal appearance. He is ill-appearing. He is not toxic-appearing.   HENT:      Right Ear: Ear canal normal. There is impacted cerumen.      Left Ear: Ear canal normal. A middle ear effusion is present. Tympanic membrane is not bulging.      Nose: Nose normal. No congestion or rhinorrhea.      Right Sinus: No maxillary sinus tenderness or frontal sinus tenderness.      Left Sinus: No maxillary sinus tenderness or frontal sinus tenderness.      Mouth/Throat:      Comments: Deferred due to positive COVID test    Eyes:      Pupils: Pupils are equal, round, and reactive to light.   Cardiovascular:      Rate and Rhythm: Regular rhythm. Tachycardia present.      Heart sounds: Normal heart sounds. No murmur heard.  Pulmonary:      Effort: Pulmonary effort is normal. No respiratory distress.      Breath sounds: Normal breath sounds. No wheezing.   Abdominal:      General: Bowel sounds are normal. There is no distension.      Palpations: Abdomen " is soft.      Tenderness: There is no abdominal tenderness.   Musculoskeletal:      Cervical back: Neck supple. No tenderness.   Lymphadenopathy:      Head:      Right side of head: No submental, submandibular or tonsillar adenopathy.      Left side of head: No submental, submandibular or tonsillar adenopathy.      Cervical: Cervical adenopathy present.   Skin:     General: Skin is warm and dry.      Findings: No rash.   Neurological:      Mental Status: He is alert.   Psychiatric:         Mood and Affect: Mood normal.         Behavior: Behavior normal.          Assessment / Plan      Assessment & Plan   Problem List Items Addressed This Visit    None  Visit Diagnoses     COVID-19    -  Primary    Relevant Orders    POCT SARS-CoV-2 Antigen TERRI    Positive for COVID-19. Discussed viral in nature, antibiotics will not be beneficial at this time. Recommend rest, push fluids, tylenol as needed, and promethazine-dm as needed for bothersome cough. Educated on CDC quarantine guidelines and he verbalized understanding. Red flag symptoms to seek urgent care with discussed. Follow-up here as needed.          Follow Up   Return if symptoms worsen or fail to improve.  Patient was given instructions and counseling regarding his condition or for health maintenance advice. Please see specific information pulled into the AVS if appropriate.     TRE Presley  [facility]

## 2023-02-28 ENCOUNTER — ANTICOAGULATION VISIT (OUTPATIENT)
Dept: INTERNAL MEDICINE | Facility: CLINIC | Age: 28
End: 2023-02-28
Payer: COMMERCIAL

## 2023-02-28 DIAGNOSIS — I82.4Z3 ACUTE DEEP VEIN THROMBOSIS (DVT) OF DISTAL VEIN OF BOTH LOWER EXTREMITIES: Primary | ICD-10-CM

## 2023-02-28 LAB — INR PPP: 3.4 (ref 0.9–1.1)

## 2023-02-28 PROCEDURE — 85610 PROTHROMBIN TIME: CPT | Performed by: INTERNAL MEDICINE

## 2023-02-28 PROCEDURE — 36416 COLLJ CAPILLARY BLOOD SPEC: CPT | Performed by: INTERNAL MEDICINE

## 2023-03-04 DIAGNOSIS — D68.61 ANTIPHOSPHOLIPID SYNDROME: ICD-10-CM

## 2023-03-04 DIAGNOSIS — I82.4Z3 ACUTE DEEP VEIN THROMBOSIS (DVT) OF DISTAL VEIN OF BOTH LOWER EXTREMITIES: ICD-10-CM

## 2023-03-04 DIAGNOSIS — I63.511 ACUTE ISCHEMIC RIGHT MCA STROKE: ICD-10-CM

## 2023-03-06 RX ORDER — WARFARIN SODIUM 1 MG/1
1 TABLET ORAL NIGHTLY
Qty: 30 TABLET | Refills: 0 | Status: SHIPPED | OUTPATIENT
Start: 2023-03-06 | End: 2023-03-08 | Stop reason: SDUPTHER

## 2023-03-06 RX ORDER — WARFARIN SODIUM 5 MG/1
5 TABLET ORAL
Qty: 30 TABLET | Refills: 0 | Status: SHIPPED | OUTPATIENT
Start: 2023-03-06 | End: 2023-03-08 | Stop reason: SDUPTHER

## 2023-03-08 ENCOUNTER — ANTICOAGULATION VISIT (OUTPATIENT)
Dept: INTERNAL MEDICINE | Facility: CLINIC | Age: 28
End: 2023-03-08
Payer: COMMERCIAL

## 2023-03-08 DIAGNOSIS — I63.511 ACUTE ISCHEMIC RIGHT MCA STROKE: ICD-10-CM

## 2023-03-08 DIAGNOSIS — I82.4Z3 ACUTE DEEP VEIN THROMBOSIS (DVT) OF DISTAL VEIN OF BOTH LOWER EXTREMITIES: ICD-10-CM

## 2023-03-08 DIAGNOSIS — D68.61 ANTIPHOSPHOLIPID SYNDROME: ICD-10-CM

## 2023-03-08 LAB — INR PPP: 1.8 (ref 0.9–1.1)

## 2023-03-08 PROCEDURE — 36416 COLLJ CAPILLARY BLOOD SPEC: CPT | Performed by: INTERNAL MEDICINE

## 2023-03-08 PROCEDURE — 85610 PROTHROMBIN TIME: CPT | Performed by: INTERNAL MEDICINE

## 2023-03-08 RX ORDER — WARFARIN SODIUM 5 MG/1
5 TABLET ORAL
Qty: 30 TABLET | Refills: 0 | Status: SHIPPED | OUTPATIENT
Start: 2023-03-08

## 2023-03-08 RX ORDER — WARFARIN SODIUM 1 MG/1
1 TABLET ORAL NIGHTLY
Qty: 30 TABLET | Refills: 0 | Status: SHIPPED | OUTPATIENT
Start: 2023-03-08

## 2023-03-08 NOTE — TELEPHONE ENCOUNTER
Rx Refill Note  Requested Prescriptions     Pending Prescriptions Disp Refills   • warfarin (COUMADIN) 5 MG tablet 30 tablet 0     Sig: Take 1 tablet by mouth Daily. Alternate between 4mg and 5mg.   • warfarin (Coumadin) 1 MG tablet 30 tablet 0     Sig: Take 1 tablet by mouth Every Night.      Last office visit with prescribing clinician: 12/6/2022   Last telemedicine visit with prescribing clinician:   Next office visit with prescribing clinician: no follow ups on file  {    Capri Mandujano MA  03/08/23, 14:53 EST

## 2023-03-16 ENCOUNTER — ANTICOAGULATION VISIT (OUTPATIENT)
Dept: INTERNAL MEDICINE | Facility: CLINIC | Age: 28
End: 2023-03-16
Payer: COMMERCIAL

## 2023-03-16 LAB — INR PPP: 2.6 (ref 0.9–1.1)

## 2023-03-16 PROCEDURE — 85610 PROTHROMBIN TIME: CPT | Performed by: INTERNAL MEDICINE

## 2023-03-16 PROCEDURE — 36416 COLLJ CAPILLARY BLOOD SPEC: CPT | Performed by: INTERNAL MEDICINE

## 2023-03-20 ENCOUNTER — OFFICE VISIT (OUTPATIENT)
Dept: NEUROLOGY | Facility: CLINIC | Age: 28
End: 2023-03-20
Payer: COMMERCIAL

## 2023-03-20 VITALS
HEIGHT: 72 IN | DIASTOLIC BLOOD PRESSURE: 90 MMHG | WEIGHT: 196 LBS | TEMPERATURE: 98.9 F | HEART RATE: 92 BPM | OXYGEN SATURATION: 99 % | SYSTOLIC BLOOD PRESSURE: 122 MMHG | BODY MASS INDEX: 26.55 KG/M2

## 2023-03-20 DIAGNOSIS — D68.61 ANTIPHOSPHOLIPID SYNDROME: ICD-10-CM

## 2023-03-20 DIAGNOSIS — Z86.73 HISTORY OF STROKE: Primary | ICD-10-CM

## 2023-03-20 DIAGNOSIS — I82.4Z3 ACUTE DEEP VEIN THROMBOSIS (DVT) OF DISTAL VEIN OF BOTH LOWER EXTREMITIES: ICD-10-CM

## 2023-03-20 PROCEDURE — 1159F MED LIST DOCD IN RCRD: CPT | Performed by: CLINICAL NURSE SPECIALIST

## 2023-03-20 PROCEDURE — 1160F RVW MEDS BY RX/DR IN RCRD: CPT | Performed by: CLINICAL NURSE SPECIALIST

## 2023-03-20 PROCEDURE — 99214 OFFICE O/P EST MOD 30 MIN: CPT | Performed by: CLINICAL NURSE SPECIALIST

## 2023-03-20 RX ORDER — BACLOFEN 5 MG/1
5 TABLET ORAL 2 TIMES DAILY
Qty: 60 TABLET | Refills: 5 | Status: SHIPPED | OUTPATIENT
Start: 2023-03-20

## 2023-03-31 ENCOUNTER — ANTICOAGULATION VISIT (OUTPATIENT)
Dept: INTERNAL MEDICINE | Facility: CLINIC | Age: 28
End: 2023-03-31
Payer: COMMERCIAL

## 2023-03-31 DIAGNOSIS — D69.6 THROMBOCYTOPENIA: Primary | ICD-10-CM

## 2023-03-31 LAB — INR PPP: 2.2 (ref 0.9–1.1)

## 2023-03-31 PROCEDURE — 85610 PROTHROMBIN TIME: CPT | Performed by: INTERNAL MEDICINE

## 2023-03-31 PROCEDURE — 36416 COLLJ CAPILLARY BLOOD SPEC: CPT | Performed by: INTERNAL MEDICINE

## 2023-04-08 DIAGNOSIS — I63.511 ACUTE ISCHEMIC RIGHT MCA STROKE: ICD-10-CM

## 2023-04-08 DIAGNOSIS — D68.61 ANTIPHOSPHOLIPID SYNDROME: ICD-10-CM

## 2023-04-10 DIAGNOSIS — I63.511 ACUTE ISCHEMIC RIGHT MCA STROKE: ICD-10-CM

## 2023-04-10 DIAGNOSIS — D68.61 ANTIPHOSPHOLIPID SYNDROME: ICD-10-CM

## 2023-04-10 DIAGNOSIS — I82.4Z3 ACUTE DEEP VEIN THROMBOSIS (DVT) OF DISTAL VEIN OF BOTH LOWER EXTREMITIES: ICD-10-CM

## 2023-04-10 RX ORDER — WARFARIN SODIUM 1 MG/1
1 TABLET ORAL NIGHTLY
Qty: 30 TABLET | Refills: 2 | Status: SHIPPED | OUTPATIENT
Start: 2023-04-10

## 2023-04-10 RX ORDER — WARFARIN SODIUM 5 MG/1
5 TABLET ORAL
Qty: 30 TABLET | Refills: 2 | Status: SHIPPED | OUTPATIENT
Start: 2023-04-10

## 2023-04-18 ENCOUNTER — ANTICOAGULATION VISIT (OUTPATIENT)
Dept: INTERNAL MEDICINE | Facility: CLINIC | Age: 28
End: 2023-04-18
Payer: COMMERCIAL

## 2023-04-18 LAB — INR PPP: 3.2 (ref 0.9–1.1)

## 2023-04-27 ENCOUNTER — ANTICOAGULATION VISIT (OUTPATIENT)
Dept: INTERNAL MEDICINE | Facility: CLINIC | Age: 28
End: 2023-04-27
Payer: COMMERCIAL

## 2023-04-27 LAB — INR PPP: 2.3 (ref 0.9–1.1)

## 2023-05-17 DIAGNOSIS — I82.4Z3 ACUTE DEEP VEIN THROMBOSIS (DVT) OF DISTAL VEIN OF BOTH LOWER EXTREMITIES: ICD-10-CM

## 2023-05-17 DIAGNOSIS — D68.61 ANTIPHOSPHOLIPID SYNDROME: ICD-10-CM

## 2023-05-17 DIAGNOSIS — I63.511 ACUTE ISCHEMIC RIGHT MCA STROKE: ICD-10-CM

## 2023-05-17 RX ORDER — WARFARIN SODIUM 5 MG/1
5 TABLET ORAL
Qty: 90 TABLET | Refills: 0 | Status: SHIPPED | OUTPATIENT
Start: 2023-05-17

## 2023-05-17 RX ORDER — WARFARIN SODIUM 1 MG/1
1 TABLET ORAL NIGHTLY
Qty: 90 TABLET | Refills: 0 | Status: SHIPPED | OUTPATIENT
Start: 2023-05-17

## 2023-05-17 NOTE — TELEPHONE ENCOUNTER
Rx Refill Note  Requested Prescriptions     Pending Prescriptions Disp Refills    warfarin (COUMADIN) 5 MG tablet 30 tablet 2     Sig: Take 1 tablet by mouth Daily. Alternate between 4mg and 5mg.    warfarin (Coumadin) 1 MG tablet 30 tablet 2     Sig: Take 1 tablet by mouth Every Night.      Last office visit with prescribing clinician: 12/6/2022   Last telemedicine visit with prescribing clinician: 4/10/2023   Next office visit with prescribing clinician: 6/28/2023                         Would you like a call back once the refill request has been completed: [] Yes [] No    If the office needs to give you a call back, can they leave a voicemail: [] Yes [] No    Aman Askew Rep  05/17/23, 09:39 EDT

## 2023-05-19 ENCOUNTER — ANTICOAGULATION VISIT (OUTPATIENT)
Dept: INTERNAL MEDICINE | Facility: CLINIC | Age: 28
End: 2023-05-19
Payer: COMMERCIAL

## 2023-05-19 LAB — INR PPP: 2 (ref 0.9–1.1)

## 2023-06-05 ENCOUNTER — ANTICOAGULATION VISIT (OUTPATIENT)
Dept: INTERNAL MEDICINE | Facility: CLINIC | Age: 28
End: 2023-06-05
Payer: COMMERCIAL

## 2023-06-05 LAB — INR PPP: 1.9 (ref 0.9–1.1)

## 2023-06-15 ENCOUNTER — ANTICOAGULATION VISIT (OUTPATIENT)
Dept: INTERNAL MEDICINE | Facility: CLINIC | Age: 28
End: 2023-06-15
Payer: COMMERCIAL

## 2023-06-15 LAB — INR PPP: 2.8 (ref 0.9–1.1)

## 2023-07-28 ENCOUNTER — ANTICOAGULATION VISIT (OUTPATIENT)
Dept: INTERNAL MEDICINE | Facility: CLINIC | Age: 28
End: 2023-07-28
Payer: COMMERCIAL

## 2023-07-28 DIAGNOSIS — D68.61 ANTIPHOSPHOLIPID SYNDROME: Primary | ICD-10-CM

## 2023-07-28 DIAGNOSIS — I63.511 ACUTE ISCHEMIC RIGHT MCA STROKE: ICD-10-CM

## 2023-07-28 LAB — INR PPP: 3 (ref 2–3)

## 2023-07-28 PROCEDURE — 36416 COLLJ CAPILLARY BLOOD SPEC: CPT | Performed by: INTERNAL MEDICINE

## 2023-07-28 PROCEDURE — 85610 PROTHROMBIN TIME: CPT | Performed by: INTERNAL MEDICINE

## 2023-08-02 ENCOUNTER — TELEPHONE (OUTPATIENT)
Dept: INTERNAL MEDICINE | Facility: CLINIC | Age: 28
End: 2023-08-02
Payer: COMMERCIAL

## 2023-08-11 ENCOUNTER — ANTICOAGULATION VISIT (OUTPATIENT)
Dept: INTERNAL MEDICINE | Facility: CLINIC | Age: 28
End: 2023-08-11
Payer: COMMERCIAL

## 2023-08-11 ENCOUNTER — CLINICAL SUPPORT (OUTPATIENT)
Dept: INTERNAL MEDICINE | Facility: CLINIC | Age: 28
End: 2023-08-11
Payer: COMMERCIAL

## 2023-08-11 DIAGNOSIS — I63.511 ACUTE ISCHEMIC RIGHT MCA STROKE: ICD-10-CM

## 2023-08-11 DIAGNOSIS — I82.4Z3 ACUTE DEEP VEIN THROMBOSIS (DVT) OF DISTAL VEIN OF BOTH LOWER EXTREMITIES: ICD-10-CM

## 2023-08-11 DIAGNOSIS — D68.61 ANTIPHOSPHOLIPID SYNDROME: ICD-10-CM

## 2023-08-11 LAB
INR PPP: 1.5 (ref 0.9–1.1)
INR PPP: 1.5 (ref 0.9–1.1)

## 2023-08-11 PROCEDURE — 36416 COLLJ CAPILLARY BLOOD SPEC: CPT | Performed by: INTERNAL MEDICINE

## 2023-08-11 PROCEDURE — 85610 PROTHROMBIN TIME: CPT | Performed by: INTERNAL MEDICINE

## 2023-08-14 RX ORDER — WARFARIN SODIUM 5 MG/1
5 TABLET ORAL
Qty: 90 TABLET | Refills: 0 | Status: SHIPPED | OUTPATIENT
Start: 2023-08-14

## 2023-08-14 RX ORDER — WARFARIN SODIUM 1 MG/1
TABLET ORAL
Qty: 90 TABLET | Refills: 0 | Status: SHIPPED | OUTPATIENT
Start: 2023-08-14

## 2023-08-28 ENCOUNTER — ANTICOAGULATION VISIT (OUTPATIENT)
Dept: INTERNAL MEDICINE | Facility: CLINIC | Age: 28
End: 2023-08-28
Payer: COMMERCIAL

## 2023-08-28 DIAGNOSIS — I63.511 ACUTE ISCHEMIC RIGHT MCA STROKE: ICD-10-CM

## 2023-08-28 DIAGNOSIS — D68.61 ANTIPHOSPHOLIPID SYNDROME: ICD-10-CM

## 2023-08-28 DIAGNOSIS — D69.6 THROMBOCYTOPENIA: Primary | ICD-10-CM

## 2023-08-28 DIAGNOSIS — I82.4Z3 ACUTE DEEP VEIN THROMBOSIS (DVT) OF DISTAL VEIN OF BOTH LOWER EXTREMITIES: ICD-10-CM

## 2023-08-28 LAB — INR PPP: 1.5 (ref 2–3)

## 2023-08-28 PROCEDURE — 36416 COLLJ CAPILLARY BLOOD SPEC: CPT | Performed by: INTERNAL MEDICINE

## 2023-08-28 PROCEDURE — 85610 PROTHROMBIN TIME: CPT | Performed by: INTERNAL MEDICINE

## 2023-08-28 RX ORDER — WARFARIN SODIUM 5 MG/1
5 TABLET ORAL
Qty: 90 TABLET | Refills: 0 | Status: SHIPPED | OUTPATIENT
Start: 2023-08-28

## 2023-08-28 RX ORDER — WARFARIN SODIUM 1 MG/1
1 TABLET ORAL NIGHTLY
Qty: 90 TABLET | Refills: 0 | Status: SHIPPED | OUTPATIENT
Start: 2023-08-28

## 2023-09-05 ENCOUNTER — ANTICOAGULATION VISIT (OUTPATIENT)
Dept: INTERNAL MEDICINE | Facility: CLINIC | Age: 28
End: 2023-09-05
Payer: COMMERCIAL

## 2023-09-05 LAB — INR PPP: 1.5 (ref 0.9–1.1)

## 2023-09-14 ENCOUNTER — ANTICOAGULATION VISIT (OUTPATIENT)
Dept: INTERNAL MEDICINE | Facility: CLINIC | Age: 28
End: 2023-09-14
Payer: COMMERCIAL

## 2023-09-14 LAB — INR PPP: 2.2 (ref 0.9–1.1)

## 2023-09-14 PROCEDURE — 85610 PROTHROMBIN TIME: CPT | Performed by: INTERNAL MEDICINE

## 2023-09-14 PROCEDURE — 36416 COLLJ CAPILLARY BLOOD SPEC: CPT | Performed by: INTERNAL MEDICINE

## 2023-09-27 ENCOUNTER — ANTICOAGULATION VISIT (OUTPATIENT)
Dept: INTERNAL MEDICINE | Facility: CLINIC | Age: 28
End: 2023-09-27
Payer: COMMERCIAL

## 2023-09-27 DIAGNOSIS — I82.4Z3 ACUTE DEEP VEIN THROMBOSIS (DVT) OF DISTAL VEIN OF BOTH LOWER EXTREMITIES: Primary | ICD-10-CM

## 2023-09-27 LAB — INR PPP: 2.3 (ref 0.9–1.1)

## 2023-09-27 PROCEDURE — 85610 PROTHROMBIN TIME: CPT | Performed by: INTERNAL MEDICINE

## 2023-09-27 PROCEDURE — 36416 COLLJ CAPILLARY BLOOD SPEC: CPT | Performed by: INTERNAL MEDICINE

## 2023-10-11 ENCOUNTER — ANTICOAGULATION VISIT (OUTPATIENT)
Dept: INTERNAL MEDICINE | Facility: CLINIC | Age: 28
End: 2023-10-11
Payer: COMMERCIAL

## 2023-10-11 LAB — INR PPP: 1.6 (ref 0.9–1.1)

## 2023-10-23 DIAGNOSIS — I82.4Z3 ACUTE DEEP VEIN THROMBOSIS (DVT) OF DISTAL VEIN OF BOTH LOWER EXTREMITIES: Primary | ICD-10-CM

## 2023-10-23 DIAGNOSIS — D68.61 ANTIPHOSPHOLIPID SYNDROME: ICD-10-CM

## 2023-10-25 ENCOUNTER — ANTICOAGULATION VISIT (OUTPATIENT)
Dept: INTERNAL MEDICINE | Facility: CLINIC | Age: 28
End: 2023-10-25
Payer: COMMERCIAL

## 2023-10-25 ENCOUNTER — TELEPHONE (OUTPATIENT)
Dept: INTERNAL MEDICINE | Facility: CLINIC | Age: 28
End: 2023-10-25

## 2023-10-25 LAB — INR PPP: 2 (ref 0.9–1.1)

## 2023-10-25 PROCEDURE — 36416 COLLJ CAPILLARY BLOOD SPEC: CPT | Performed by: INTERNAL MEDICINE

## 2023-10-25 PROCEDURE — 85610 PROTHROMBIN TIME: CPT | Performed by: INTERNAL MEDICINE

## 2023-11-16 ENCOUNTER — TELEPHONE (OUTPATIENT)
Dept: INTERNAL MEDICINE | Facility: CLINIC | Age: 28
End: 2023-11-16
Payer: COMMERCIAL

## 2023-11-16 ENCOUNTER — OFFICE VISIT (OUTPATIENT)
Dept: NEUROLOGY | Facility: CLINIC | Age: 28
End: 2023-11-16
Payer: COMMERCIAL

## 2023-11-16 VITALS
HEIGHT: 72 IN | OXYGEN SATURATION: 98 % | DIASTOLIC BLOOD PRESSURE: 72 MMHG | TEMPERATURE: 98.4 F | WEIGHT: 191 LBS | SYSTOLIC BLOOD PRESSURE: 122 MMHG | HEART RATE: 93 BPM | BODY MASS INDEX: 25.87 KG/M2

## 2023-11-16 DIAGNOSIS — I33.0 AORTIC VALVE VEGETATION: ICD-10-CM

## 2023-11-16 DIAGNOSIS — D68.61 ANTIPHOSPHOLIPID SYNDROME: ICD-10-CM

## 2023-11-16 DIAGNOSIS — Z86.73 HISTORY OF STROKE: ICD-10-CM

## 2023-11-16 DIAGNOSIS — D68.61 ANTIPHOSPHOLIPID SYNDROME: Primary | ICD-10-CM

## 2023-11-16 DIAGNOSIS — I63.9 CEREBROVASCULAR ACCIDENT (CVA), UNSPECIFIED MECHANISM: Primary | ICD-10-CM

## 2023-11-16 NOTE — PROGRESS NOTES
Follow Up Office Visit      Encounter Date: 2023   Patient Name: Vargas Reyna  : 1995   MRN: [id number]   PCP: Dustin Patel DO    Chief Complaint:    Chief Complaint   Patient presents with    Follow-up    Stroke       History of Present Illness:   Initial Visit 2023--Vargas Reyna is a 27 y.o. male  PMH of antiphospholipid syndrome on Xarelto, previously failed Eliquis, who declined Coumadin in the past, renal infarcts, and multiple DVTs who is here today to establish care.  He was admitted to [facility] on 2022 with a right MCA stroke.  He was noted to have thrombus in the M2 segment of his right MCA.  He underwent an unsuccessful R MCA M2 thrombectomy. 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 Heme-onc was consulted.  Patient was started on Coumadin by Dr. Clark with heme-onc.  NIH at discharge improved to 3.  He went to rehab at [facility] for 2 weeks.  He followed up with Dr. Clark at the end of .  Patient has been compliant with Coumadin and INR checks.  He is scheduled for a repeat VALERI on  to evaluate for resolution of aortic valve mass.     Since discharge, patient has been doing well.  He has improved strength in his left upper extremity and left lower extremity.  He is walking with a quad cane as needed.  He is continuing to have intermittent spasms in his left lower extremity are worse at night.  Patient states they resolve when he takes baclofen.  He reports that he supposed to start outpatient PT/OT with [facility] but has not heard back from them.  Would prefer to do PT and OT in [place] closer to his home.     Clinic Visit 2023:  Patient reports he feels like he is doing very well. He only complains of having left arm and specifically hand weakness and incoordination. He went to [facility] after discharge for 2 weeks and found this to be helpful. He was recommended to do outpatient therapy but reports it has been too  hard to coordinate. He has been doing stretches and exercises at home. He reports no new or worsening symptoms today. He is able to walk into appointment without any assistive devices. Since his last appointment he has had repeat VALERI with Dr. Navarro and it did show resolution of aortic valve mass. He has no further appointments with cardiology at this time. He has continued to be followed by Dr. Clark with Hem-Onc. He reports he will have appointments every 3-4 months at this time. He is consistent with his medications and goes to the INR clinic with no issue. He understands the importance of compliance with this in preventing future stroke. He does report that he does not wish to continue taking Lipitor. He has not had side effects but felt that he was on too much medications and just wants to take Coumadin and Baclofen. With his LDL being 78 I do not see an issue with this.      Clinic Visit 3/20/2023-patient presents today to clinic accompanied by his mother.  He continues to work on exercises left him by physical therapy.  He has had a repeat VALERI with resolution of his AV mass, he continues to follow with hematology for his antiphospholipid syndrome and is now on Coumadin.  He did have COVID in February of this year but has recovered well.  Patient has some increased tone in the left upper extremity, it was previously documented that patient refused referral for Botox, however he tells me he refused steroid injections.  Appears that there was a breakdown in communication.  However, he believes his spasticity in his hand appears to be better and at this point does not want to pursue any Botox.  He is working with only limited disability, he does admit to daily THC use.    Today's clinic visit 11/16/2023: Patient presents today for follow-up.  He states he has been doing well since his last visit.  He continues to have some difficulty with coordination of his left arm, more specifically his left hand he does  "have some spasticity involving his left hand and fingers.  He works at a gas station, primarily doing inventory and stocking items.  He notes that his difficulty with coordination does impact his activities of daily living and his ability to do his job.  He states that he has never had any PT or OT after leaving rehab; he is interested in learning exercises that may help his symptoms.  He has been compliant with all of his medications including Coumadin; no signs and symptoms of bleeding.  He is going to the lab after today's appointment for a INR check which is closely followed by his PCP.             Subjective        I have reviewed and the following portions of the patient's history were updated as appropriate: past family history, past medical history, past social history, past surgical history and problem list.    Medications:     Current Outpatient Medications:     Baclofen (LIORESAL) 5 MG tablet, Take 1 tablet by mouth 2 (Two) Times a Day. PRN, Disp: 60 tablet, Rfl: 5    sertraline (Zoloft) 25 MG tablet, Take 1 tablet by mouth Daily., Disp: 30 tablet, Rfl: 2    warfarin (COUMADIN) 5 MG tablet, Take 1 tablet by mouth Daily. Take with 1 mg for a total of 6 mg nightly, Disp: 90 tablet, Rfl: 0    Allergies:   No Known Allergies    Objective     Physical Exam:   Vital Signs:   Vitals:    11/16/23 1333   BP: 122/72   Pulse: 93   Temp: 98.4 °F (36.9 °C)   SpO2: 98%   Weight: 86.6 kg (191 lb)   Height: 182.9 cm (72.01\")     Body mass index is 25.9 kg/m².    Physical Exam  Vitals reviewed.   Constitutional:       Appearance: Normal appearance.   HENT:      Head: Normocephalic and atraumatic.   Eyes:      General: Lids are normal.      Extraocular Movements: Extraocular movements intact.      Pupils: Pupils are equal, round, and reactive to light.   Cardiovascular:      Rate and Rhythm: Normal rate.   Pulmonary:      Effort: Pulmonary effort is normal. No respiratory distress.   Musculoskeletal:         General: No " swelling.      Cervical back: Normal range of motion and neck supple.   Skin:     General: Skin is warm and dry.   Neurological:      Mental Status: He is alert and oriented to person, place, and time.      Cranial Nerves: No cranial nerve deficit.      Sensory: No sensory deficit.      Motor: Weakness present.      Coordination: Coordination abnormal.   Psychiatric:         Mood and Affect: Mood normal.         Speech: Speech normal.         Behavior: Behavior normal.       Neurological Exam  Mental Status  Alert. Oriented to person, place, and time. Speech is normal. Language is fluent with no aphasia. Attention and concentration are normal.    Cranial Nerves  CN II: Visual fields full to confrontation.  CN III, IV, VI: Extraocular movements intact bilaterally. Normal lids and orbits bilaterally. Pupils equal round and reactive to light bilaterally.  CN V: Facial sensation is normal.  CN VII: Full and symmetric facial movement.  CN XI: Shoulder shrug strength is normal.  CN XII: Tongue midline without atrophy or fasciculations.    Motor  Normal muscle bulk throughout. No fasciculations present. Increased muscle tone. LUE. The following abnormal movements were seen: Spasticity of the left hand.   Strength is 5/5 in all four extremities except as noted.  LUE 4/5 .    Sensory  Light touch is normal in upper and lower extremities.     Coordination  Left: Finger-to-nose abnormality: Rapid alternating movement abnormality:    Gait  Casual gait is normal including stance, stride, and arm swing.       Procedures  PHQ-2 Depression Screening  Little interest or pleasure in doing things? 0-->not at all   Feeling down, depressed, or hopeless? 0-->not at all   PHQ-2 Total Score 0       Results Reviewed:   Labs  INR 2.0 (anticoagulation clinic/PCP managing coumadin)    No other new labs or imaging available for my review      Assessment / Plan      Assessment/Plan:   Diagnoses and all orders for this visit:    1. History of  stroke (Primary)  -Etiology likely antiphospholipid syndrome  -Patient will continue working closely with hematology and Coumadin clinic to maintain INR between 2 and 3  -Planning for Botox injections for ongoing left hand spasticity.  Patient will need 100 units  -Reviewed signs and symptoms of stroke and when to call 911  -Occupational therapy referral  -THC cessation     2. Antiphospholipid syndrome (HCC)  -Continue coumadin  -Continue work closely with hematology     Discussed the importance of medication compliance Coumadin per orders (goal INR 2-3) and lifestyle modifications smoking cessation, increased physical activity, and implementation of healthy diet to help reduce the risk of future cerebrovascular events.  Also discussed the signs symptoms that would warrant the patient return back to the emergency department including unilateral weakness, unilateral numbness, visual disturbances, loss of balance, speech difficulties, and/or a sudden severe headache.  Patient verbalized understanding.    Addendum 11/17/2023.  Reviewed patient's case with Dr. Birch who agrees that Botox injections to the patient's left upper extremity may help relieve some of his spasticity and aid ongoing coordination issues.  Will request 100 units, clinic to call with appointment    Follow Up:   Return if symptoms worsen or fail to improve.    TRE Garcia  [facility] Stroke

## 2023-11-17 ENCOUNTER — ANTICOAGULATION VISIT (OUTPATIENT)
Dept: INTERNAL MEDICINE | Facility: CLINIC | Age: 28
End: 2023-11-17
Payer: COMMERCIAL

## 2023-11-17 LAB — INR PPP: 1.9 (ref 0.9–1.1)

## 2023-11-22 ENCOUNTER — TREATMENT (OUTPATIENT)
Dept: PHYSICAL THERAPY | Facility: CLINIC | Age: 28
End: 2023-11-22
Payer: COMMERCIAL

## 2023-11-22 DIAGNOSIS — Z74.09 IMPAIRED MOBILITY AND ADLS: ICD-10-CM

## 2023-11-22 DIAGNOSIS — Z78.9 IMPAIRED MOBILITY AND ADLS: ICD-10-CM

## 2023-11-22 DIAGNOSIS — R27.8 DECREASED COORDINATION: Primary | ICD-10-CM

## 2023-11-22 PROCEDURE — 97167 OT EVAL HIGH COMPLEX 60 MIN: CPT | Performed by: OCCUPATIONAL THERAPIST

## 2023-11-22 PROCEDURE — 97530 THERAPEUTIC ACTIVITIES: CPT | Performed by: OCCUPATIONAL THERAPIST

## 2023-11-22 NOTE — PROGRESS NOTES
Occupational Therapy Initial Evaluation and Plan of Care  [facility] [place]  [address]    Patient: Vargas Reyna   : 1995  Diagnosis/ICD-10 Code:  Decreased coordination [R27.8]  Referring practitioner: TRE Garcia  Date of Initial Visit: Type: THERAPY  Noted: 2023  Today's Date: 2023  Patient seen for 1 sessions    Subjective:     Subjective Questionnaire: 9HPT RIGHT:   19.77  LEFT: u/a to perform on the left, was eventually able to retrieve peg from tray and perform semblance of rotation.    Subjective Evaluation    History of Present Illness  Mechanism of injury: Pt presents to OP OT with his mother. Has a PMH of antiphospholipid syndrome. He was admitted to [facility] on 2022 with a right MCA stroke.  He was noted to have thrombus in the M2 segment of his right MCA.  He underwent an unsuccessful R MCA M2 thrombectomy. 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 He went to rehab at [facility] for 2 weeks.     Patient states he is on a low dose of baclofen and does not note much improvement in spasticity of his hand and arm. He reports it is tight from shoulder down into hand.    After prolonged period of time with sustained positioning, will have sensation of arm/LE falling asleep. Driving short distances ok. Does not utilize his LUE for much extra for assisting the R.   Trouble with buttons, pulling up and adjusting sleeves, tying shoe laces. Bathing, shaving, teeth brushing all independent at this time. Prepping meals is difficult, especially knife use. Is using mostly microwave and simple to cook meals. (Placing cream cheese on bagel).   Numbness primarily in digit II. Hypersensitive to temperature.      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  Lives with: tub shower-no issues.    Hand dominance: right    Patient Goals  Patient goals  for therapy: increased strength and independence with ADLs/IADLs  Patient goal: L hand use, coordination and strength         Objective          Neurological Testing     Sensation     Shoulder   Left Shoulder   Diminished: light touch and proprioception    Elbow   Left Elbow   Diminished: light touch and proprioception    Wrist/Hand   Left   Diminished: light touch and proprioception    Active Range of Motion   Left Shoulder   Flexion: WFL  Abduction: WFL    Left Elbow   Flexion: WFL  Extension: WFL  Forearm supination: WFL  Forearm pronation: WFL    Left Wrist   Wrist flexion: WFL  Wrist extension: WFL    Additional Active Range of Motion Details  Feels tightness in LUE  Reports tightness in extension and supination  Unable to oppose digits I to V unless OT manually assisting d/t spasticity L hand.     Able to extend each digit from table L hand however not independently of one another.    Wrist flexion with normal AROM after OT completed PROM        OT Neuro     See flow sheets and activity logs for details          Assessment & Plan       Assessment  Impairments: abnormal coordination, abnormal gait, abnormal muscle firing, abnormal muscle tone, abnormal or restricted ROM, activity intolerance, impaired balance, impaired physical strength, lacks appropriate home exercise program, safety issue and weight-bearing intolerance   Functional limitations: carrying objects, lifting, pulling, pushing, reaching behind back, reaching overhead and unable to perform repetitive tasks   Assessment details: Pt presents s/p CVA in which he has received no therapy since IP rehab. Pt exhibits spasticity in his LUE that prevents functional pinch and prehension of items affecting his overall dexterity and . He also has sensory changes including hypersensitivity and numbness/tingling. Pt to benefit from extensive skilled OT services to address deficits and promote increased independence, safety and engagement in daily tasks in  order to promote improved pt satisfaction and overall QOL. This will be accomplished through methods of ADL/IADL retraining, NMR techniques, sensory re-ed, ROM/stretching, strengthening, WB'ing, AE/DME needs, home/activity/environmental modifications, balance training, among other needs as they arise.   Prognosis: fair    Goals  Plan Goals: Pt will be independent in performing opposition with each finger to thumb L hand by 12 wks in order to carry over to functional grasp prehension of cups and eating utensils for self-feeding independence.     Pt will be independent with self-feeding using bilateral hands with use of AE and modifications as needed by 6 wks.     Pt will be independent with L hand & UE stretching HEPs to improve spasticity in order to increase ROM, flexibility and carry over into functional grasp patterns by 6 wks.     Pt will be able to perform 9HPT with L hand by 12wks to demonstrate ability to perform functional in-hand manipulation and translation skills for daily use in dressing activities.     Pt will be independent with AE, home/activity/environmental modifications to increase independence with ADLs and IADLs by 12 wks.     Plan  Planned modality interventions: thermotherapy (paraffin bath)  Planned therapy interventions: ADL retraining, balance/weight-bearing training, fine motor coordination training, flexibility, functional ROM exercises, home exercise program, motor coordination training, neuromuscular re-education, postural training, strengthening, stretching, therapeutic activities, transfer training, abdominal trunk stabilization, body mechanics training, gait training, IADL retraining, manual therapy, soft tissue mobilization and joint mobilization  Frequency: 1x week  Duration in visits: 12  Treatment plan discussed with: patient and family  Plan details: Establish skilled OT services to address pt needs in order to promote increased independence, safety and engagement in daily tasks.          Timed:  Manual Therapy:    0     mins  69169;  Therapeutic Exercise:    0     mins  58877;     Neuromuscular Holger:    0    mins  24091;    Therapeutic Activity:     10     mins  98727;     Self-Care/ADL     0     mins  06791;   Sensory Int. Tech      0     mins 30476;  Ultrasound:     0     mins  53061;    Electrical Stimulation:    0     mins  87900 ( );    Untimed:  Electrical Stimulation:    0     mins  09933 ( );    Timed Treatment:   10   mins   Total Treatment:     45   mins    OT Signature: Radha Chadwick MS, OTR/L, CDP  [place] License #: [id number]  DATE TREATMENT INITIATED: 11/22/2023    Initial Certification Certification Period: 11/22/2023 thru 2/19/2024  I certify that the therapy services are furnished while this patient is under my care. The services outlined above are required by this patient and will be reviewed every 90 days.     PHYSICIAN: Alysha Pearson APRN  NPI: [id number]                                      DATE:     Physician Signature: __________________________________  Alysha Pearson APRN    Please sign and return via fax to [phone]  Thank you,   [facility] Occupational Therapy

## 2023-11-29 ENCOUNTER — TREATMENT (OUTPATIENT)
Dept: PHYSICAL THERAPY | Facility: CLINIC | Age: 28
End: 2023-11-29
Payer: COMMERCIAL

## 2023-11-29 DIAGNOSIS — Z74.09 IMPAIRED MOBILITY AND ADLS: ICD-10-CM

## 2023-11-29 DIAGNOSIS — R27.8 DECREASED COORDINATION: Primary | ICD-10-CM

## 2023-11-29 DIAGNOSIS — Z78.9 IMPAIRED MOBILITY AND ADLS: ICD-10-CM

## 2023-11-29 PROCEDURE — 97110 THERAPEUTIC EXERCISES: CPT | Performed by: OCCUPATIONAL THERAPIST

## 2023-11-29 PROCEDURE — 97530 THERAPEUTIC ACTIVITIES: CPT | Performed by: OCCUPATIONAL THERAPIST

## 2023-11-29 NOTE — PROGRESS NOTES
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 [employer]  Pain: 0/10 until using L shoulder-can increase to 7/10 w/nerve related pain  Subjective Questionnaire: n/a  Clinical Progress: improved  Home Program Compliance: Yes  Treatment has included: therapeutic exercise and therapeutic activity    Subjective   Objective     OT Neuro          OT Exercises       Row Name 23 1415             Exercise 1    Exercise Name 1 BUE ROM/strengthening and act jessica, no resistance, 2 mins forward, 2 mins backward  -ST      Cueing 1 --  cuing for positioning of L shoulder  -ST      Time (Minutes) 1 4  -ST         Exercise 2    Exercise Name 2 pincer grasp precision  -ST         Exercise 3    Exercise Name 3 KTape for thumb positioning to better abduction  -ST         Exercise 4    Exercise Name 4 entire hand f/e, cuing for PIP and DIP movement L hand, manual assist intermittently digits III-V  -ST         Exercise 5    Exercise Name 5 no money B; cuing for scap retraction  -ST      Sets 5 2  -ST      Reps 5 10  -ST         Exercise 6    Exercise Name 6 pincer grasp of items completing pronation/supination L hand  -ST         Exercise 7    Exercise Name 7 pincer grasp of items reaching in horizontal ab/adduction, cuing to maintain L shoulder seated in correct position  -ST         Exercise 8    Exercise Name 8 opposition digits I/II L hand  -ST         Exercise 9    Exercise Name 9 hook grasp to MCP flexion L hand  -ST      Sets 9 2  -ST      Reps 9 10  -ST                User Key  (r) = Recorded By, (t)  = Taken By, (c) = Cosigned By      Initials Name Provider Type    Rahda Cameron, OTR Occupational Therapist                     Assessment & Plan       Assessment  Assessment details: Pt demonstrates trouble  and isolating digit I and II d/t spasticity and makes it difficult to complete full/correct opposition of these digits. Pt also u/a to fully abduct and extend thumb d/t tightness in CMC jt. Pt however is able to actively and correctly f/e thumb IP jt. Pt has nerve type pain in L shoulder that radiates down arm and into hand when supinating. It does improve intermittently when pt positions shoulder better with scapular retraction and slight depression.     Plan  Plan details: Continue w/OT POC and goals as est to aid with increased independence, safety and engagement in daily tasks.       Visit Diagnoses:    ICD-10-CM ICD-9-CM   1. Decreased coordination  R27.8 781.3   2. Impaired mobility and ADLs  Z74.09 V49.89    Z78.9              Timed:  Manual Therapy:    0     mins  10393;  Therapeutic Exercise:    15     mins  01132;     Neuromuscular Holger:    0    mins  46261;    Therapeutic Activity:     30     mins  00270;     Self-Care/ADL     0     mins  51667;   Sensory Int. Tech      0     mins 75920;  Ultrasound:     0     mins  34676;    Electrical Stimulation:    0     mins  79785 ( );    Untimed:  Electrical Stimulation:    0     mins  80030 ( );    Timed Treatment:   45   mins   Total Treatment:     45   mins    OT Signature: Radha Chadwick MS, OTR/L, CDP  [place] License #: [id number]

## 2023-12-13 ENCOUNTER — TREATMENT (OUTPATIENT)
Dept: PHYSICAL THERAPY | Facility: CLINIC | Age: 28
End: 2023-12-13
Payer: COMMERCIAL

## 2023-12-13 DIAGNOSIS — Z74.09 IMPAIRED MOBILITY AND ADLS: ICD-10-CM

## 2023-12-13 DIAGNOSIS — R27.8 DECREASED COORDINATION: Primary | ICD-10-CM

## 2023-12-13 DIAGNOSIS — Z78.9 IMPAIRED MOBILITY AND ADLS: ICD-10-CM

## 2023-12-13 PROCEDURE — 97110 THERAPEUTIC EXERCISES: CPT | Performed by: OCCUPATIONAL THERAPIST

## 2023-12-13 PROCEDURE — 97530 THERAPEUTIC ACTIVITIES: CPT | Performed by: OCCUPATIONAL THERAPIST

## 2023-12-13 NOTE — PROGRESS NOTES
Occupational Therapy Daily Treatment Note  Visit: 3  Date of Initial Visit: Type: THERAPY  Noted: 2023      Patient: Vargas Reyna   : 1995  Diagnosis/ICD-10 Code:  Decreased coordination [R27.8]  Referring practitioner: TRE Garcia  Date of Initial Visit: Type: THERAPY  Noted: 2023  Today's Date: 2023  Patient seen for 3 sessions      Subjective:   Patient reports: has been working on movements and exercises, notes significantly less discomfort this date  Pain: 0/10  Subjective Questionnaire: n/a  Clinical Progress: improved  Home Program Compliance: Yes  Treatment has included: therapeutic exercise and therapeutic activity    Subjective   Objective     OT Neuro     See flow sheets and activity logs for details        Assessment & Plan       Assessment  Assessment details: Pt demonstrates significantly improved positioning of LUE with all movements w/less compensation. He does still require intermittent cuing but much less than previous session. Pt also with greater control of digits with repetitive movements. Trialed PROM and stretching prior to active movements to aid in motor control. Pt exhibits most difficulty with thumb abduction and extension which impacts correct prehension of items.     Plan  Plan details: Continue w/OT POC and goals as est to aid with progressing pt independence, safety and engagement in daily tasks.         Visit Diagnoses:    ICD-10-CM ICD-9-CM   1. Decreased coordination  R27.8 781.3   2. Impaired mobility and ADLs  Z74.09 V49.89    Z78.9              Timed:  Manual Therapy:    0     mins  19060;  Therapeutic Exercise:    15     mins  80913;     Neuromuscular Holger:    0    mins  55272;    Therapeutic Activity:     30     mins  26178;     Self-Care/ADL     0     mins  22277;   Sensory Int. Tech      0     mins 34369;  Ultrasound:     0     mins  85913;    Electrical Stimulation:    0     mins  40917 ( );    Untimed:  Electrical  Stimulation:    0     mins  68785 ( );    Timed Treatment:   45   mins   Total Treatment:     45   mins    OT Signature: Radha Chadwick MS, OTR/L, CDP  [place] License #: [id number]

## 2023-12-18 ENCOUNTER — ANTICOAGULATION VISIT (OUTPATIENT)
Dept: INTERNAL MEDICINE | Facility: CLINIC | Age: 28
End: 2023-12-18
Payer: COMMERCIAL

## 2023-12-18 DIAGNOSIS — I63.511 ACUTE ISCHEMIC RIGHT MCA STROKE: ICD-10-CM

## 2023-12-18 DIAGNOSIS — D68.61 ANTIPHOSPHOLIPID SYNDROME: ICD-10-CM

## 2023-12-18 LAB — INR PPP: 1.5 (ref 2–3)

## 2023-12-18 PROCEDURE — 36416 COLLJ CAPILLARY BLOOD SPEC: CPT | Performed by: INTERNAL MEDICINE

## 2023-12-18 PROCEDURE — 85610 PROTHROMBIN TIME: CPT | Performed by: INTERNAL MEDICINE

## 2023-12-18 RX ORDER — WARFARIN SODIUM 5 MG/1
5 TABLET ORAL DAILY
Qty: 90 TABLET | Refills: 0 | Status: SHIPPED | OUTPATIENT
Start: 2023-12-18

## 2023-12-20 ENCOUNTER — TREATMENT (OUTPATIENT)
Dept: PHYSICAL THERAPY | Facility: CLINIC | Age: 28
End: 2023-12-20
Payer: COMMERCIAL

## 2023-12-20 DIAGNOSIS — Z74.09 IMPAIRED MOBILITY AND ADLS: ICD-10-CM

## 2023-12-20 DIAGNOSIS — Z78.9 IMPAIRED MOBILITY AND ADLS: ICD-10-CM

## 2023-12-20 DIAGNOSIS — R27.8 DECREASED COORDINATION: Primary | ICD-10-CM

## 2023-12-20 PROCEDURE — 97110 THERAPEUTIC EXERCISES: CPT | Performed by: OCCUPATIONAL THERAPIST

## 2023-12-20 PROCEDURE — 97530 THERAPEUTIC ACTIVITIES: CPT | Performed by: OCCUPATIONAL THERAPIST

## 2023-12-20 PROCEDURE — 97112 NEUROMUSCULAR REEDUCATION: CPT | Performed by: OCCUPATIONAL THERAPIST

## 2023-12-20 NOTE — PROGRESS NOTES
Occupational Therapy Daily Treatment Note  Visit: 4  Date of Initial Visit: Type: THERAPY  Noted: 2023      Patient: Vargas Reyna   : 1995  Diagnosis/ICD-10 Code:  Decreased coordination [R27.8]  Referring practitioner: TRE Garcia  Date of Initial Visit: Type: THERAPY  Noted: 2023  Today's Date: 2023  Patient seen for 4 sessions      Subjective:   Patient reports: intermittent nerve pain LUE  Pain: 4/10  Subjective Questionnaire: n/a  Clinical Progress: improved  Home Program Compliance: Yes  Treatment has included: therapeutic exercise, neuromuscular re-education, and therapeutic activity    Subjective   Objective     OT Neuro     See flow sheets and activity logs for details    OT Exercises       Row Name 23 1455             Exercise 1    Exercise Name 1 BUE ROM/strengthening and act jessica, no resistance, 3 mins forward, 3 mins backward  -ST      Time (Minutes) 1 6  -ST         Exercise 2    Exercise Name 2 sensory re-ed with submerging hand into rice L hand  -ST         Exercise 3    Exercise Name 3 isolated thumb ab/adduction L hand  -ST         Exercise 4    Exercise Name 4 L thumb distraction at CMC thumb  -ST         Exercise 5    Exercise Name 5 L proprioception grasp with cup of water, w and w/o vision occluded  -ST         Exercise 6    Exercise Name 6 extensive sensory testing LUE  -ST                User Key  (r) = Recorded By, (t) = Taken By, (c) = Cosigned By      Initials Name Provider Type    Radha Cameron OTR Occupational Therapist                   Assessment & Plan       Assessment  Impairments: abnormal coordination, abnormal gait, abnormal muscle firing, abnormal muscle tone, abnormal or restricted ROM, activity intolerance, impaired balance, impaired physical strength, lacks appropriate home exercise program, safety issue and weight-bearing intolerance   Functional limitations: carrying objects, lifting, pulling, pushing, reaching  behind back, reaching overhead and unable to perform repetitive tasks   Assessment details: Completed extensive sensory training and testing to better understand proprioception and light touch/stereognosis awareness.     Pt to benefit from extensive skilled OT services to address deficits and promote increased independence, safety and engagement in daily tasks in order to promote improved pt satisfaction and overall QOL. This will be accomplished through methods of ADL/IADL retraining, NMR techniques, sensory re-ed, ROM/stretching, strengthening, WB'ing, AE/DME needs, home/activity/environmental modifications, balance training, among other needs as they arise.   Prognosis: fair    Goals  Plan Goals: Pt will be independent in performing opposition with each finger to thumb L hand by 12 wks in order to carry over to functional grasp prehension of cups and eating utensils for self-feeding independence.     Pt will be independent with self-feeding using bilateral hands with use of AE and modifications as needed by 6 wks.     Pt will be independent with L hand & UE stretching HEPs to improve spasticity in order to increase ROM, flexibility and carry over into functional grasp patterns by 6 wks.     Pt will be able to perform 9HPT with L hand by 12wks to demonstrate ability to perform functional in-hand manipulation and translation skills for daily use in dressing activities.     Pt will be independent with AE, home/activity/environmental modifications to increase independence with ADLs and IADLs by 12 wks.     Plan  Planned modality interventions: thermotherapy (paraffin bath)  Planned therapy interventions: ADL retraining, balance/weight-bearing training, fine motor coordination training, flexibility, functional ROM exercises, home exercise program, motor coordination training, neuromuscular re-education, postural training, strengthening, stretching, therapeutic activities, transfer training, abdominal trunk  stabilization, body mechanics training, gait training, IADL retraining, manual therapy, soft tissue mobilization and joint mobilization  Frequency: 1x week  Treatment plan discussed with: patient and family  Plan details: Continue w/ skilled OT services to address pt needs in order to promote increased independence, safety and engagement in daily tasks.         Visit Diagnoses:    ICD-10-CM ICD-9-CM   1. Decreased coordination  R27.8 781.3   2. Impaired mobility and ADLs  Z74.09 V49.89    Z78.9              Timed:  Manual Therapy:    0     mins  89483;  Therapeutic Exercise:    10     mins  22550;     Neuromuscular Holger:    23    mins  57868;    Therapeutic Activity:     20     mins  91969;     Self-Care/ADL     0     mins  88332;   Sensory Int. Tech      0     mins 06940;  Ultrasound:     0     mins  60458;    Electrical Stimulation:    0     mins  15270 ( );    Untimed:  Electrical Stimulation:    0     mins  50870 ( );    Timed Treatment:   53   mins   Total Treatment:     53   mins    OT Signature: Radha Chadwick MS, OTR/L, CDP  [place] License #: [id number]

## 2023-12-27 ENCOUNTER — TREATMENT (OUTPATIENT)
Dept: PHYSICAL THERAPY | Facility: CLINIC | Age: 28
End: 2023-12-27
Payer: COMMERCIAL

## 2023-12-27 DIAGNOSIS — Z74.09 IMPAIRED MOBILITY AND ADLS: ICD-10-CM

## 2023-12-27 DIAGNOSIS — R27.8 DECREASED COORDINATION: Primary | ICD-10-CM

## 2023-12-27 DIAGNOSIS — Z78.9 IMPAIRED MOBILITY AND ADLS: ICD-10-CM

## 2023-12-27 PROCEDURE — 97110 THERAPEUTIC EXERCISES: CPT | Performed by: OCCUPATIONAL THERAPIST

## 2023-12-27 PROCEDURE — 97530 THERAPEUTIC ACTIVITIES: CPT | Performed by: OCCUPATIONAL THERAPIST

## 2023-12-27 NOTE — PROGRESS NOTES
"OT Progress Note        [facility] [place]                                              [address]      Patient: Vargas Reyna   : 1995  Diagnosis/ICD-10 Code:  Decreased coordination [R27.8]  Referring practitioner: RTE Garcia  Date of Initial Visit: Type: THERAPY  Noted: 2023  Today's Date: 2023  Patient seen for 5 sessions    Subjective:   Patient reports: \"I took an extra Baclofen this morning\"   Pain: 4/10 L shoulder pain  Subjective Questionnaire: Box and Blocks R: 50  L:16  Clinical Progress: improved  Home Program Compliance: Yes  Treatment has included: therapeutic exercise and therapeutic activity    Subjective   Objective     OT Neuro     See low sheets and activity logs for details     Assessment & Plan       Assessment  Impairments: abnormal coordination, abnormal gait, abnormal muscle firing, abnormal muscle tone, abnormal or restricted ROM, activity intolerance, impaired balance, impaired physical strength, lacks appropriate home exercise program, safety issue and weight-bearing intolerance   Functional limitations: carrying objects, lifting, pulling, pushing, reaching behind back, reaching overhead and unable to perform repetitive tasks   Assessment details: OT re-assessment per POC. Completed Box and Blocks assessment which pt was successful in completing with affected L hand. He is obviously much less coordinated and speed in greatly affected with L vs R and still needs quite a bit or work on pincer grasp. Alt pincer grasp b/t each digit to thumb. Digit V remains very difficult with opposition d/t not only lack of sensation but also decreased PIP flexion and control. Trialed paraffin for aiding with spasticity however did not notice improvement. Pt able to progress to isolation of digits I/II/III with intermittent manual assist.   Pt to benefit from extensive skilled OT services to address deficits and promote increased " independence, safety and engagement in daily tasks in order to promote improved pt satisfaction and overall QOL. This will be accomplished through methods of ADL/IADL retraining, NMR techniques, sensory re-ed, ROM/stretching, strengthening, WB'ing, AE/DME needs, home/activity/environmental modifications, balance training, among other needs as they arise.   Prognosis: fair    Goals  Plan Goals: Pt will be independent in performing opposition with each finger to thumb L hand by 12 wks in order to carry over to functional grasp prehension of cups and eating utensils for self-feeding independence. PROGRESSING/PARTIALLY MET    Pt will be independent with self-feeding using bilateral hands with use of AE and modifications as needed by 6 wks. PROGRESSING/PARTIALLY MET    Pt will be independent with L hand & UE stretching HEPs to improve spasticity in order to increase ROM, flexibility and carry over into functional grasp patterns by 6 wks. PARTIALLY MET    Pt will be able to perform 9HPT with L hand by 12wks to demonstrate ability to perform functional in-hand manipulation and translation skills for daily use in dressing activities. ONGOING    Pt will be independent with AE, home/activity/environmental modifications to increase independence with ADLs and IADLs by 12 wks. ONGOING    Plan  Planned modality interventions: thermotherapy (paraffin bath)  Planned therapy interventions: ADL retraining, balance/weight-bearing training, fine motor coordination training, flexibility, functional ROM exercises, home exercise program, motor coordination training, neuromuscular re-education, postural training, strengthening, stretching, therapeutic activities, transfer training, abdominal trunk stabilization, body mechanics training, gait training, IADL retraining, manual therapy, soft tissue mobilization and joint mobilization  Frequency: 1x week  Duration in visits: 8  Treatment plan discussed with: patient and family  Plan details:  Continue w/ skilled OT services to address pt needs in order to promote increased independence, safety and engagement in daily tasks.         Visit Diagnoses:    ICD-10-CM ICD-9-CM   1. Decreased coordination  R27.8 781.3   2. Impaired mobility and ADLs  Z74.09 V49.89    Z78.9        Progress toward previous goals: Partially Met      Recommendations: Continue as planned  Timeframe: 2 months  Prognosis to achieve goals: fair    OT Signature: Radha Chadwick MS, OTR/L, CDP  [place] License #: [id number]    Based upon review of the patient's progress and continued therapy plan, it is my medical opinion that Vargas Reyna should continue occupational therapy treatment at [facility] PHYSICAL THERAPY  [facility] [address] [facility] [address] [phone].    Signature: __________________________________  Alysha Pearson APRN    Timed:  Manual Therapy:    0     mins  74840;  Therapeutic Exercise:    8     mins  79259;     Neuromuscular Holger:    0    mins  44267;    Therapeutic Activity:     45     mins  94050;     Self-Care/ADL     0     mins  18015;   Sensory Int. Tech      0     mins 86992;  Ultrasound:     0     mins  09207;    Electrical Stimulation:    0     mins  91058 ( );    Untimed:  Electrical Stimulation:    0     mins  98958 ( );    Timed Treatment:   53   mins   Total Treatment:     53   mins

## 2024-01-02 ENCOUNTER — ANTICOAGULATION VISIT (OUTPATIENT)
Dept: INTERNAL MEDICINE | Facility: CLINIC | Age: 29
End: 2024-01-02
Payer: COMMERCIAL

## 2024-01-02 LAB — INR PPP: 2.2 (ref 0.9–1.1)

## 2024-01-02 PROCEDURE — 36416 COLLJ CAPILLARY BLOOD SPEC: CPT | Performed by: INTERNAL MEDICINE

## 2024-01-02 PROCEDURE — 85610 PROTHROMBIN TIME: CPT | Performed by: INTERNAL MEDICINE

## 2024-01-10 ENCOUNTER — TREATMENT (OUTPATIENT)
Dept: PHYSICAL THERAPY | Facility: CLINIC | Age: 29
End: 2024-01-10
Payer: COMMERCIAL

## 2024-01-10 DIAGNOSIS — Z78.9 IMPAIRED MOBILITY AND ADLS: ICD-10-CM

## 2024-01-10 DIAGNOSIS — R27.8 DECREASED COORDINATION: Primary | ICD-10-CM

## 2024-01-10 DIAGNOSIS — Z74.09 IMPAIRED MOBILITY AND ADLS: ICD-10-CM

## 2024-01-10 PROCEDURE — 97112 NEUROMUSCULAR REEDUCATION: CPT | Performed by: OCCUPATIONAL THERAPIST

## 2024-01-10 PROCEDURE — 97530 THERAPEUTIC ACTIVITIES: CPT | Performed by: OCCUPATIONAL THERAPIST

## 2024-01-10 NOTE — PROGRESS NOTES
Occupational Therapy Daily Treatment Note  Visit: 6  Date of Initial Visit: Type: THERAPY  Noted: 2023      Patient: Vargas Reyna   : 1995  Diagnosis/ICD-10 Code:  Decreased coordination [R27.8]  Referring practitioner: TRE Garcia  Date of Initial Visit: Type: THERAPY  Noted: 2023  Today's Date: 1/10/2024  Patient seen for 6 sessions      Subjective:   Patient reports: Pt has been having more success with bead activities intermittently.   Pain: 3/10 L UE   Subjective Questionnaire: n/a  Clinical Progress: improved  Home Program Compliance: Yes  Treatment has included: neuromuscular re-education and therapeutic activity    Subjective   Objective     OT Neuro          OT Exercises       Row Name 01/10/24 1417             Exercise 1    Exercise Name 1 BUE ROM/strengthening and act jessica, no resistance, 3 mins forward, 3 mins backward  -ST      Time (Minutes) 1 6  -ST         Exercise 2    Exercise Name 2 opposition each L digit  -ST         Exercise 3    Exercise Name 3 isolated thumb f/e with addition of digit II extension  -ST         Exercise 4    Exercise Name 4 trialed above activity w/addition of III  -ST         Exercise 5    Exercise Name 5 pincer grasp, retrieval of lg wooden dowels L hand, attempts to increase simultaneous IV/V flexion  -ST         Exercise 6    Exercise Name 6 3 jaw hemal grasp, retrieval of lg wooden dowels L hand, attempts to increase simultaneous IV/V flexion  -ST         Exercise 7    Exercise Name 7 pincer grasp, retrieval of small wooden dowels L hand, attempts to increase simultaneous IV/V flexion  -ST         Exercise 8    Exercise Name 8 3 jaw hemal grasp, retrieval of small wooden dowels L hand, attempts to increase simultaneous IV/V flexion  -ST         Exercise 9    Exercise Name 9 OS LUE supination/pronation stretch, full elbow extension  -ST      Cueing 9 --  cuing to avoid wrist extension  -ST      Sets 9 1  -ST      Reps 9 5  -ST                 User Key  (r) = Recorded By, (t) = Taken By, (c) = Cosigned By      Initials Name Provider Type    Radha Cameron, OTR Occupational Therapist                     Assessment & Plan       Assessment  Impairments: abnormal coordination, abnormal gait, abnormal muscle firing, abnormal muscle tone, abnormal or restricted ROM, activity intolerance, impaired balance, impaired physical strength, lacks appropriate home exercise program, safety issue and weight-bearing intolerance   Functional limitations: carrying objects, lifting, pulling, pushing, reaching behind back, reaching overhead and unable to perform repetitive tasks   Assessment details: Pt continues to have waxing and waning with spasticity of LUE but is significantly improved this date with pt demonstrating increased accuracy, speed and control of performing opposition to each digit along with control of thumb in isolated and coordinated efforts. Pt able to f/e IP & CMC jts while also controlling digit II and at times and with intermittent min A, digits III. Pt however has difficulty when attempting to retrieve items, knocking over nearby objects d/t lack of control with IV and V digits. Addressed flexion of those digits with both pincer and 3-jaw hemal patterns as pt able to control minimally. Pt to continue addressing this at home with HEP.   Pt to benefit from extensive skilled OT services to address deficits and promote increased independence, safety and engagement in daily tasks in order to promote improved pt satisfaction and overall QOL. This will be accomplished through methods of ADL/IADL retraining, NMR techniques, sensory re-ed, ROM/stretching, strengthening, WB'ing, AE/DME needs, home/activity/environmental modifications, balance training, among other needs as they arise.   Prognosis: fair    Goals  Plan Goals: Pt will be independent in performing opposition with each finger to thumb L hand by 12 wks in order to carry over to functional  grasp prehension of cups and eating utensils for self-feeding independence. PROGRESSING/PARTIALLY MET    Pt will be independent with self-feeding using bilateral hands with use of AE and modifications as needed by 6 wks. PROGRESSING/PARTIALLY MET    Pt will be independent with L hand & UE stretching HEPs to improve spasticity in order to increase ROM, flexibility and carry over into functional grasp patterns by 6 wks. PARTIALLY MET    Pt will be able to perform 9HPT with L hand by 12wks to demonstrate ability to perform functional in-hand manipulation and translation skills for daily use in dressing activities. ONGOING    Pt will be independent with AE, home/activity/environmental modifications to increase independence with ADLs and IADLs by 12 wks. ONGOING    Plan  Planned modality interventions: thermotherapy (paraffin bath)  Planned therapy interventions: ADL retraining, balance/weight-bearing training, fine motor coordination training, flexibility, functional ROM exercises, home exercise program, motor coordination training, neuromuscular re-education, postural training, strengthening, stretching, therapeutic activities, transfer training, abdominal trunk stabilization, body mechanics training, gait training, IADL retraining, manual therapy, soft tissue mobilization and joint mobilization  Treatment plan discussed with: patient and family  Plan details: Continue w/ skilled OT services to address pt needs in order to promote increased independence, safety and engagement in daily tasks.         Visit Diagnoses:    ICD-10-CM ICD-9-CM   1. Decreased coordination  R27.8 781.3   2. Impaired mobility and ADLs  Z74.09 V49.89    Z78.9              Timed:  Manual Therapy:    0     mins  93835;  Therapeutic Exercise:    0     mins  04432;     Neuromuscular Holger:    23    mins  36290;    Therapeutic Activity:     18     mins  75497;     Self-Care/ADL     0     mins  76332;   Sensory Int. Tech      0     mins  38049;  Ultrasound:     0     mins  49724;    Electrical Stimulation:    0     mins  45747 ( );    Untimed:  Electrical Stimulation:    0     mins  94381 ( );    Timed Treatment:   41   mins   Total Treatment:     41   mins    OT Signature: Radha Chadwick MS, OTR/L, CDP  [place] License #: [id number]

## 2024-01-17 ENCOUNTER — TREATMENT (OUTPATIENT)
Dept: PHYSICAL THERAPY | Facility: CLINIC | Age: 29
End: 2024-01-17
Payer: COMMERCIAL

## 2024-01-17 DIAGNOSIS — Z74.09 IMPAIRED MOBILITY AND ADLS: ICD-10-CM

## 2024-01-17 DIAGNOSIS — Z78.9 IMPAIRED MOBILITY AND ADLS: ICD-10-CM

## 2024-01-17 DIAGNOSIS — R27.8 DECREASED COORDINATION: Primary | ICD-10-CM

## 2024-01-17 PROCEDURE — 97110 THERAPEUTIC EXERCISES: CPT | Performed by: OCCUPATIONAL THERAPIST

## 2024-01-17 PROCEDURE — 97530 THERAPEUTIC ACTIVITIES: CPT | Performed by: OCCUPATIONAL THERAPIST

## 2024-01-17 NOTE — PROGRESS NOTES
Occupational Therapy Daily Treatment Note  Visit: 7  Date of Initial Visit: Type: THERAPY  Noted: 2023      Patient: Vargas Reyna   : 1995  Diagnosis/ICD-10 Code:  Decreased coordination [R27.8]  Referring practitioner: TRE Garcia  Date of Initial Visit: Type: THERAPY  Noted: 2023  Today's Date: 2024  Patient seen for 7 sessions      Subjective:   Patient reports: pt with increased pain and tightness this date with the cold. Took Baclofen prior to therapy session however did not seem to make any difference  Pain: 7/10 with certain movements   Subjective Questionnaire: n/a  Clinical Progress: unchanged  Home Program Compliance: Yes  Treatment has included: therapeutic exercise and therapeutic activity    Subjective   Objective     OT Neuro     See flow sheets and activity logs for details      Assessment & Plan       Assessment  Impairments: abnormal coordination, abnormal gait, abnormal muscle firing, abnormal muscle tone, abnormal or restricted ROM, activity intolerance, impaired balance, impaired physical strength, lacks appropriate home exercise program, safety issue and weight-bearing intolerance   Functional limitations: carrying objects, lifting, pulling, pushing, reaching behind back, reaching overhead and unable to perform repetitive tasks   Assessment details: Unfortunately, d/t the increased cold weather, pt with worsening spasticity and pain with the entire LUE. Despite trial of heat and stretching, this did not help.       Pt to benefit from extensive skilled OT services to address deficits and promote increased independence, safety and engagement in daily tasks in order to promote improved pt satisfaction and overall QOL. This will be accomplished through methods of ADL/IADL retraining, NMR techniques, sensory re-ed, ROM/stretching, strengthening, WB'ing, AE/DME needs, home/activity/environmental modifications, balance training, among other needs as they  arise.   Prognosis: fair    Goals  Plan Goals: Pt will be independent in performing opposition with each finger to thumb L hand by 12 wks in order to carry over to functional grasp prehension of cups and eating utensils for self-feeding independence. PROGRESSING/PARTIALLY MET    Pt will be independent with self-feeding using bilateral hands with use of AE and modifications as needed by 6 wks. PROGRESSING/PARTIALLY MET    Pt will be independent with L hand & UE stretching HEPs to improve spasticity in order to increase ROM, flexibility and carry over into functional grasp patterns by 6 wks. PARTIALLY MET    Pt will be able to perform 9HPT with L hand by 12wks to demonstrate ability to perform functional in-hand manipulation and translation skills for daily use in dressing activities. ONGOING    Pt will be independent with AE, home/activity/environmental modifications to increase independence with ADLs and IADLs by 12 wks. ONGOING    Plan  Planned modality interventions: thermotherapy (paraffin bath)  Planned therapy interventions: ADL retraining, balance/weight-bearing training, fine motor coordination training, flexibility, functional ROM exercises, home exercise program, motor coordination training, neuromuscular re-education, postural training, strengthening, stretching, therapeutic activities, transfer training, abdominal trunk stabilization, body mechanics training, gait training, IADL retraining, manual therapy, soft tissue mobilization and joint mobilization  Treatment plan discussed with: patient and family  Plan details: Continue w/ skilled OT services to address pt needs in order to promote increased independence, safety and engagement in daily tasks.         Visit Diagnoses:    ICD-10-CM ICD-9-CM   1. Decreased coordination  R27.8 781.3   2. Impaired mobility and ADLs  Z74.09 V49.89    Z78.9              Timed:  Manual Therapy:    0     mins  96215;  Therapeutic Exercise:    30     mins  03917;      Neuromuscular Holger:    0    mins  97235;    Therapeutic Activity:     10     mins  32538;     Self-Care/ADL     0     mins  48350;   Sensory Int. Tech      0     mins 99064;  Ultrasound:     0     mins  52429;    Electrical Stimulation:    0     mins  83775 ( );    Untimed:  Electrical Stimulation:    0     mins  91002 ( );    Timed Treatment:   40   mins   Total Treatment:     40   mins    OT Signature: Radha Chadwick MS, OTR/L, CDP  [place] License #: [id number]

## 2024-01-24 ENCOUNTER — TREATMENT (OUTPATIENT)
Dept: PHYSICAL THERAPY | Facility: CLINIC | Age: 29
End: 2024-01-24
Payer: COMMERCIAL

## 2024-01-24 DIAGNOSIS — R27.8 DECREASED COORDINATION: Primary | ICD-10-CM

## 2024-01-24 PROCEDURE — 97530 THERAPEUTIC ACTIVITIES: CPT | Performed by: OCCUPATIONAL THERAPIST

## 2024-01-24 PROCEDURE — 97112 NEUROMUSCULAR REEDUCATION: CPT | Performed by: OCCUPATIONAL THERAPIST

## 2024-01-24 NOTE — PROGRESS NOTES
Occupational Therapy Daily Treatment Note  Visit: 8  Date of Initial Visit: Type: THERAPY  Noted: 2023      Patient: Vargas Reyna   : 1995  Diagnosis/ICD-10 Code:  Decreased coordination [R27.8]  Referring practitioner: TRE Garcia  Date of Initial Visit: Type: THERAPY  Noted: 2023  Today's Date: 2024  Patient seen for 8 sessions      Subjective:   Patient reports: feels less strain and tightness in LUE than past session  Pain: 1/10 LUE  Subjective Questionnaire: n/a  Clinical Progress: improved  Home Program Compliance: Yes  Treatment has included: neuromuscular re-education and therapeutic activity    Subjective   Objective     OT Neuro     See flow sheets and activity logs for details          Assessment & Plan       Assessment  Impairments: abnormal coordination, abnormal gait, abnormal muscle firing, abnormal muscle tone, abnormal or restricted ROM, activity intolerance, impaired balance, impaired physical strength, lacks appropriate home exercise program, safety issue and weight-bearing intolerance   Functional limitations: carrying objects, lifting, pulling, pushing, reaching behind back, reaching overhead and unable to perform repetitive tasks   Assessment details: Pt with improved spasticity s/p massage and scrapping methods to LUE as stretching does not aid with reduction in uncontrolled movement. Pt especially exhibits more tightness and spasticity in thenar musculature and along entire ulnar border or forearm. Encouraged pt to complete self massage with stretching at home, especially prior to and following exercise. Pt exhibited improved ability to perform opposition to each digit with greater overall accuracy.  Pt to benefit from extensive skilled OT services to address deficits and promote increased independence, safety and engagement in daily tasks in order to promote improved pt satisfaction and overall QOL. This will be accomplished through methods of  ADL/IADL retraining, NMR techniques, sensory re-ed, ROM/stretching, strengthening, WB'ing, AE/DME needs, home/activity/environmental modifications, balance training, among other needs as they arise.   Prognosis: fair    Goals  Plan Goals: Pt will be independent in performing opposition with each finger to thumb L hand by 12 wks in order to carry over to functional grasp prehension of cups and eating utensils for self-feeding independence. PROGRESSING/PARTIALLY MET    Pt will be independent with self-feeding using bilateral hands with use of AE and modifications as needed by 6 wks. PROGRESSING/PARTIALLY MET    Pt will be independent with L hand & UE stretching HEPs to improve spasticity in order to increase ROM, flexibility and carry over into functional grasp patterns by 6 wks. PARTIALLY MET    Pt will be able to perform 9HPT with L hand by 12wks to demonstrate ability to perform functional in-hand manipulation and translation skills for daily use in dressing activities. ONGOING    Pt will be independent with AE, home/activity/environmental modifications to increase independence with ADLs and IADLs by 12 wks. ONGOING    Plan  Planned modality interventions: thermotherapy (paraffin bath)  Planned therapy interventions: ADL retraining, balance/weight-bearing training, fine motor coordination training, flexibility, functional ROM exercises, home exercise program, motor coordination training, neuromuscular re-education, postural training, strengthening, stretching, therapeutic activities, transfer training, abdominal trunk stabilization, body mechanics training, gait training, IADL retraining, manual therapy, soft tissue mobilization and joint mobilization  Treatment plan discussed with: patient and family  Plan details: Continue w/ skilled OT services to address pt needs in order to promote increased independence, safety and engagement in daily tasks.         Visit Diagnoses:    ICD-10-CM ICD-9-CM   1. Decreased  coordination  R27.8 781.3             Timed:  Manual Therapy:    0     mins  89603;  Therapeutic Exercise:    0     mins  67200;     Neuromuscular Holger:    25    mins  60440;    Therapeutic Activity:     15     mins  68205;     Self-Care/ADL     0     mins  88344;   Sensory Int. Tech      0     mins 68613;  Ultrasound:     0     mins  96831;    Electrical Stimulation:    0     mins  48896 ( );    Untimed:  Electrical Stimulation:    0     mins  61803 ( );    Timed Treatment:   40   mins   Total Treatment:     40   mins    OT Signature: Radha Chadwick MS, OTR/L, CDP  [place] License #: [id number]

## 2024-01-29 ENCOUNTER — ANTICOAGULATION VISIT (OUTPATIENT)
Dept: INTERNAL MEDICINE | Facility: CLINIC | Age: 29
End: 2024-01-29
Payer: COMMERCIAL

## 2024-01-29 DIAGNOSIS — I82.4Z3 ACUTE DEEP VEIN THROMBOSIS (DVT) OF DISTAL VEIN OF BOTH LOWER EXTREMITIES: ICD-10-CM

## 2024-01-29 DIAGNOSIS — D69.6 THROMBOCYTOPENIA: Primary | ICD-10-CM

## 2024-01-29 LAB — INR PPP: 1.6 (ref 2–3)

## 2024-01-29 PROCEDURE — 36416 COLLJ CAPILLARY BLOOD SPEC: CPT | Performed by: INTERNAL MEDICINE

## 2024-01-29 PROCEDURE — 85610 PROTHROMBIN TIME: CPT | Performed by: INTERNAL MEDICINE

## 2024-01-31 ENCOUNTER — TREATMENT (OUTPATIENT)
Dept: PHYSICAL THERAPY | Facility: CLINIC | Age: 29
End: 2024-01-31
Payer: COMMERCIAL

## 2024-01-31 DIAGNOSIS — Z74.09 IMPAIRED MOBILITY AND ADLS: ICD-10-CM

## 2024-01-31 DIAGNOSIS — R27.8 DECREASED COORDINATION: Primary | ICD-10-CM

## 2024-01-31 DIAGNOSIS — Z78.9 IMPAIRED MOBILITY AND ADLS: ICD-10-CM

## 2024-01-31 PROCEDURE — 97112 NEUROMUSCULAR REEDUCATION: CPT | Performed by: OCCUPATIONAL THERAPIST

## 2024-01-31 PROCEDURE — 97530 THERAPEUTIC ACTIVITIES: CPT | Performed by: OCCUPATIONAL THERAPIST

## 2024-01-31 NOTE — PROGRESS NOTES
Occupational Therapy Daily Treatment Note  Visit: 9  Date of Initial Visit: Type: THERAPY  Noted: 2023      Patient: Vargas Reyna   : 1995  Diagnosis/ICD-10 Code:  Decreased coordination [R27.8]  Referring practitioner: TRE Garcia  Date of Initial Visit: Type: THERAPY  Noted: 2023  Today's Date: 2024  Patient seen for 9 sessions      Subjective:   Patient reports: no complaints, feels about the same with tightness with LUE  Pain: 1/10  Subjective Questionnaire: n/a  Clinical Progress: improved  Home Program Compliance: Yes  Treatment has included: neuromuscular re-education and therapeutic activity    Subjective   Objective     OT Neuro     See flow sheets and activity logs for detail    OT Exercises       Row Name 24 1415             Exercise 1    Exercise Name 1 BUE ROM/strengthening and act jessica, no resistance, 3 mins forward, 3 mins backward  -ST      Time (Minutes) 1 6  -ST         Exercise 2    Exercise Name 2 restraint of digits III-V L hand with ACE bandage to isolate digits I/II  -ST         Exercise 3    Exercise Name 3 isolated pincer grasp digits I/II for pincer grasp  -ST      Equipment 3 Theraputty  -ST      Resistance 3 Yellow  -ST      Sets 3 2  -ST      Reps 3 10  -ST         Exercise 4    Exercise Name 4 isolated pincer grasp of digits I/II to retrieve items  -ST      Equipment 4 Theraputty  -ST      Resistance 4 Yellow  -ST         Exercise 5    Exercise Name 5 isolated digit II flexion from MCP joint for resistance  -ST      Sets 5 2  -ST      Reps 5 10  -ST         Exercise 6    Exercise Name 6 isolated digit II f/e tapping, cuing for increasing PIP/DIP flexion  -ST                User Key  (r) = Recorded By, (t) = Taken By, (c) = Cosigned By      Initials Name Provider Type    Radha Cameron, OTR Occupational Therapist                   Assessment & Plan       Assessment  Impairments: abnormal coordination, abnormal gait, abnormal muscle  firing, abnormal muscle tone, abnormal or restricted ROM, activity intolerance, impaired balance, impaired physical strength, lacks appropriate home exercise program, safety issue and weight-bearing intolerance   Functional limitations: carrying objects, lifting, pulling, pushing, reaching behind back, reaching overhead and unable to perform repetitive tasks   Assessment details: Trialed restraint of digits III-V L hand with ace wrap to increase initiation of digits I and II for isolated pincer grasp and overall prehension. This improved pt's ability to actively move and these digits in all desired directions including MCP extension, PIP and DIP f/e. Pt is hypermobile in PIP and DIP of digit II and requires additional concentration and cuing for avoiding hyper extension and improving/initiation of flexion.   Pt to benefit from extensive skilled OT services to address deficits and promote increased independence, safety and engagement in daily tasks in order to promote improved pt satisfaction and overall QOL. This will be accomplished through methods of ADL/IADL retraining, NMR techniques, sensory re-ed, ROM/stretching, strengthening, WB'ing, AE/DME needs, home/activity/environmental modifications, balance training, among other needs as they arise.   Prognosis: fair    Goals  Plan Goals: Pt will be independent in performing opposition with each finger to thumb L hand by 12 wks in order to carry over to functional grasp prehension of cups and eating utensils for self-feeding independence. PROGRESSING/PARTIALLY MET    Pt will be independent with self-feeding using bilateral hands with use of AE and modifications as needed by 6 wks. PROGRESSING/PARTIALLY MET    Pt will be independent with L hand & UE stretching HEPs to improve spasticity in order to increase ROM, flexibility and carry over into functional grasp patterns by 6 wks. PARTIALLY MET    Pt will be able to perform 9HPT with L hand by 12wks to demonstrate  ability to perform functional in-hand manipulation and translation skills for daily use in dressing activities. ONGOING    Pt will be independent with AE, home/activity/environmental modifications to increase independence with ADLs and IADLs by 12 wks. ONGOING    Plan  Planned modality interventions: thermotherapy (paraffin bath)  Planned therapy interventions: ADL retraining, balance/weight-bearing training, fine motor coordination training, flexibility, functional ROM exercises, home exercise program, motor coordination training, neuromuscular re-education, postural training, strengthening, stretching, therapeutic activities, transfer training, abdominal trunk stabilization, body mechanics training, gait training, IADL retraining, manual therapy, soft tissue mobilization and joint mobilization  Treatment plan discussed with: patient and family  Plan details: Continue w/ skilled OT services to address pt needs in order to promote increased independence, safety and engagement in daily tasks.         Visit Diagnoses:    ICD-10-CM ICD-9-CM   1. Decreased coordination  R27.8 781.3   2. Impaired mobility and ADLs  Z74.09 V49.89    Z78.9              Timed:  Manual Therapy:    0     mins  22308;  Therapeutic Exercise:    0     mins  50287;     Neuromuscular Holger:    15    mins  87661;    Therapeutic Activity:     30     mins  58330;     Self-Care/ADL     0     mins  12968;   Sensory Int. Tech      0     mins 28741;  Ultrasound:     0     mins  71368;    Electrical Stimulation:    0     mins  48377 ( );    Untimed:  Electrical Stimulation:    0     mins  60660 ( );    Timed Treatment:   45   mins   Total Treatment:     45   mins    OT Signature: Radha Chadwick MS, OTR/L, CDP  [place] License #: [id number]

## 2024-02-07 ENCOUNTER — ANTICOAGULATION VISIT (OUTPATIENT)
Dept: INTERNAL MEDICINE | Facility: CLINIC | Age: 29
End: 2024-02-07
Payer: COMMERCIAL

## 2024-02-07 DIAGNOSIS — I82.4Z3 ACUTE DEEP VEIN THROMBOSIS (DVT) OF DISTAL VEIN OF BOTH LOWER EXTREMITIES: Primary | ICD-10-CM

## 2024-02-07 LAB — INR PPP: 1.6 (ref 0.9–1.1)

## 2024-02-07 PROCEDURE — 85610 PROTHROMBIN TIME: CPT | Performed by: INTERNAL MEDICINE

## 2024-02-07 PROCEDURE — 36416 COLLJ CAPILLARY BLOOD SPEC: CPT | Performed by: INTERNAL MEDICINE

## 2024-02-07 RX ORDER — WARFARIN SODIUM 7.5 MG/1
TABLET ORAL
Qty: 90 TABLET | Refills: 3 | Status: SHIPPED | OUTPATIENT
Start: 2024-02-07

## 2024-02-12 ENCOUNTER — TREATMENT (OUTPATIENT)
Dept: PHYSICAL THERAPY | Facility: CLINIC | Age: 29
End: 2024-02-12
Payer: COMMERCIAL

## 2024-02-12 DIAGNOSIS — Z78.9 IMPAIRED MOBILITY AND ADLS: ICD-10-CM

## 2024-02-12 DIAGNOSIS — R27.8 DECREASED COORDINATION: Primary | ICD-10-CM

## 2024-02-12 DIAGNOSIS — Z74.09 IMPAIRED MOBILITY AND ADLS: ICD-10-CM

## 2024-02-12 PROCEDURE — 97110 THERAPEUTIC EXERCISES: CPT | Performed by: OCCUPATIONAL THERAPIST

## 2024-02-12 PROCEDURE — 97530 THERAPEUTIC ACTIVITIES: CPT | Performed by: OCCUPATIONAL THERAPIST

## 2024-02-12 PROCEDURE — 97112 NEUROMUSCULAR REEDUCATION: CPT | Performed by: OCCUPATIONAL THERAPIST

## 2024-02-19 ENCOUNTER — TREATMENT (OUTPATIENT)
Dept: PHYSICAL THERAPY | Facility: CLINIC | Age: 29
End: 2024-02-19
Payer: COMMERCIAL

## 2024-02-19 DIAGNOSIS — Z74.09 IMPAIRED MOBILITY AND ADLS: ICD-10-CM

## 2024-02-19 DIAGNOSIS — Z78.9 IMPAIRED MOBILITY AND ADLS: ICD-10-CM

## 2024-02-19 DIAGNOSIS — R27.8 DECREASED COORDINATION: Primary | ICD-10-CM

## 2024-02-19 PROCEDURE — 97110 THERAPEUTIC EXERCISES: CPT | Performed by: OCCUPATIONAL THERAPIST

## 2024-02-19 PROCEDURE — 97530 THERAPEUTIC ACTIVITIES: CPT | Performed by: OCCUPATIONAL THERAPIST

## 2024-02-19 PROCEDURE — 97112 NEUROMUSCULAR REEDUCATION: CPT | Performed by: OCCUPATIONAL THERAPIST

## 2024-02-19 NOTE — PROGRESS NOTES
OT Progress Note        [facility] [place]                                              [address]      Patient: Vargas Reyna   : 1995  Diagnosis/ICD-10 Code:  Decreased coordination [R27.8]  Referring practitioner: TRE Garcia  Date of Initial Visit: Type: THERAPY  Noted: 2023  Today's Date: 2024  Patient seen for 11 sessions    Subjective:   Patient reports: no complaints  Pain: 4/10 LUE  Subjective Questionnaire: Box and Blocks RIGHT: 52 LEFT: 18  Clinical Progress: improved  Home Program Compliance: Yes  Treatment has included: therapeutic exercise, neuromuscular re-education, and therapeutic activity    See flow sheets and activity logs for additional details     Subjective   Objective          Strength/Myotome Testing     Left Wrist/Hand      (2nd hand position)     Trial 1: 40 lbs    Trial 2: 40 lbs    Trial 3: 45 lbs    Average: 41.67 lbs    Right Wrist/Hand      (2nd hand position)     Trial 1: 105 lbs    Trial 2: 105 lbs    Trial 3: 110 lbs    Average: 106.67 lbs        OT Neuro         Assessment & Plan       Assessment  Impairments: abnormal coordination, abnormal gait, abnormal muscle firing, abnormal muscle tone, abnormal or restricted ROM, activity intolerance, impaired balance, impaired physical strength, lacks appropriate home exercise program, safety issue and weight-bearing intolerance   Functional limitations: carrying objects, lifting, pulling, pushing, reaching behind back, reaching overhead and unable to perform repetitive tasks   Assessment details: OT re-assessment completed per POC. Pt demonstrates improvements with Box and Blocks score but more importantly, increased accuracy w/pincer grasp accuracy digits I/II of L affected hand. Pt exhibits increased control with repetitive stabilization of PIP jt digit III for functional tasks. Will cont to need to address this as hypermobility in this jt impacts ability to  perform correct grasp.   Pt to benefit from extensive skilled OT services to address deficits and promote increased independence, safety and engagement in daily tasks in order to promote improved pt satisfaction and overall QOL. This will be accomplished through methods of ADL/IADL retraining, NMR techniques, sensory re-ed, ROM/stretching, strengthening, WB'ing, AE/DME needs, home/activity/environmental modifications, balance training, among other needs as they arise.   Prognosis: fair    Goals  Plan Goals: Pt will be independent in performing opposition with each finger to thumb L hand by 12 wks in order to carry over to functional grasp prehension of cups and eating utensils for self-feeding independence. PROGRESSING/PARTIALLY MET    Pt will be independent with self-feeding using bilateral hands with use of AE and modifications as needed by 6 wks. PROGRESSING/PARTIALLY MET    Pt will be independent with L hand & UE stretching HEPs to improve spasticity in order to increase ROM, flexibility and carry over into functional grasp patterns by 6 wks. PARTIALLY MET    Pt will be able to perform 9HPT with L hand by 12wks to demonstrate ability to perform functional in-hand manipulation and translation skills for daily use in dressing activities. ONGOING    Pt will be independent with AE, home/activity/environmental modifications to increase independence with ADLs and IADLs by 12 wks. ONGOING    Plan  Planned modality interventions: thermotherapy (paraffin bath)  Planned therapy interventions: ADL retraining, balance/weight-bearing training, fine motor coordination training, flexibility, functional ROM exercises, home exercise program, motor coordination training, neuromuscular re-education, postural training, strengthening, stretching, therapeutic activities, transfer training, abdominal trunk stabilization, body mechanics training, gait training, IADL retraining, manual therapy, soft tissue mobilization and joint  mobilization  Treatment plan discussed with: patient and family  Plan details: Continue w/ skilled OT services to address pt needs in order to promote increased independence, safety and engagement in daily tasks.         Visit Diagnoses:    ICD-10-CM ICD-9-CM   1. Decreased coordination  R27.8 781.3   2. Impaired mobility and ADLs  Z74.09 V49.89    Z78.9        Progress toward previous goals: Partially Met      Recommendations: Continue as planned  Timeframe: 2 months  Prognosis to achieve goals: fair    OT Signature: Radha Chadwick MS, OTR/L, [facility] License #: [id number]    Based upon review of the patient's progress and continued therapy plan, it is my medical opinion that Vargas Reyna should continue occupational therapy treatment at [facility]  [facility] [address] [facility] [address] [phone].    Signature: __________________________________  Alysha Pearson APRN    Timed:  Manual Therapy:    0     mins  44035;  Therapeutic Exercise:    15     mins  52493;     Neuromuscular Holger:    10    mins  97977;    Therapeutic Activity:     20     mins  96039;     Self-Care/ADL     0     mins  47883;   Sensory Int. Tech      0     mins 56716;  Ultrasound:     0     mins  65214;    Electrical Stimulation:    0     mins  44105 ( );    Untimed:  Electrical Stimulation:    0     mins  45244 ( );    Timed Treatment:   45   mins   Total Treatment:     45   mins

## 2024-02-21 ENCOUNTER — ANTICOAGULATION VISIT (OUTPATIENT)
Dept: INTERNAL MEDICINE | Facility: CLINIC | Age: 29
End: 2024-02-21
Payer: COMMERCIAL

## 2024-02-21 DIAGNOSIS — D69.6 THROMBOCYTOPENIA: Primary | ICD-10-CM

## 2024-02-21 LAB — INR PPP: 3.5 (ref 2–3)

## 2024-02-21 PROCEDURE — 85610 PROTHROMBIN TIME: CPT | Performed by: INTERNAL MEDICINE

## 2024-02-21 PROCEDURE — 36416 COLLJ CAPILLARY BLOOD SPEC: CPT | Performed by: INTERNAL MEDICINE

## 2024-03-01 ENCOUNTER — ANTICOAGULATION VISIT (OUTPATIENT)
Dept: INTERNAL MEDICINE | Facility: CLINIC | Age: 29
End: 2024-03-01
Payer: COMMERCIAL

## 2024-03-01 DIAGNOSIS — D69.6 THROMBOCYTOPENIA: Primary | ICD-10-CM

## 2024-03-01 LAB — INR PPP: 1.9 (ref 2–3)

## 2024-03-01 PROCEDURE — 36416 COLLJ CAPILLARY BLOOD SPEC: CPT | Performed by: INTERNAL MEDICINE

## 2024-03-01 PROCEDURE — 85610 PROTHROMBIN TIME: CPT | Performed by: INTERNAL MEDICINE

## 2024-03-18 ENCOUNTER — ANTICOAGULATION VISIT (OUTPATIENT)
Dept: INTERNAL MEDICINE | Facility: CLINIC | Age: 29
End: 2024-03-18
Payer: COMMERCIAL

## 2024-03-18 DIAGNOSIS — I82.4Z3 ACUTE DEEP VEIN THROMBOSIS (DVT) OF DISTAL VEIN OF BOTH LOWER EXTREMITIES: Primary | ICD-10-CM

## 2024-03-18 LAB — INR PPP: 2.1 (ref 2–3)

## 2024-03-18 PROCEDURE — 36416 COLLJ CAPILLARY BLOOD SPEC: CPT | Performed by: INTERNAL MEDICINE

## 2024-03-18 PROCEDURE — 85610 PROTHROMBIN TIME: CPT | Performed by: INTERNAL MEDICINE

## 2024-04-04 ENCOUNTER — ANTICOAGULATION VISIT (OUTPATIENT)
Dept: INTERNAL MEDICINE | Facility: CLINIC | Age: 29
End: 2024-04-04
Payer: COMMERCIAL

## 2024-04-04 DIAGNOSIS — D68.61 ANTIPHOSPHOLIPID SYNDROME: ICD-10-CM

## 2024-04-04 DIAGNOSIS — I63.511 ACUTE ISCHEMIC RIGHT MCA STROKE: ICD-10-CM

## 2024-04-04 DIAGNOSIS — D69.6 THROMBOCYTOPENIA: Primary | ICD-10-CM

## 2024-04-04 LAB — INR PPP: 3.4 (ref 2–3)

## 2024-04-04 PROCEDURE — 85610 PROTHROMBIN TIME: CPT | Performed by: INTERNAL MEDICINE

## 2024-04-04 PROCEDURE — 36416 COLLJ CAPILLARY BLOOD SPEC: CPT | Performed by: INTERNAL MEDICINE

## 2024-04-04 NOTE — TELEPHONE ENCOUNTER
Caller: MaribellVargas    Relationship: Self    Best call back number: [phone]     Requested Prescriptions:   Requested Prescriptions     Pending Prescriptions Disp Refills    warfarin (COUMADIN) 5 MG tablet [Pharmacy Med Name: Warfarin Sodium 5 MG Oral Tablet] 90 tablet 0     Sig: TAKE 1 TABLET BY MOUTH ON MONDAY, WEDNESDAY AND FRIDAY. TAKE 1.5 TABLETS BY MOUTH ON TUESDAY, THURSDAY, SATURDAY AND SUNDAY.        Pharmacy where request should be sent: [address] [phone] [facility] [phone] FX     Last office visit with prescribing clinician: 6/28/2023   Last telemedicine visit with prescribing clinician: Visit date not found   Next office visit with prescribing clinician: Visit date not found     Additional details provided by patient: PATIENT IS COMPLETELY OUT OF THIS MEDICATION.    Does the patient have less than a 3 day supply:  [x] Yes  [] No    Would you like a call back once the refill request has been completed: [] Yes [x] No    If the office needs to give you a call back, can they leave a voicemail: [] Yes [x] No    Aman Aldana Rep   04/04/24 13:57 EDT

## 2024-04-05 RX ORDER — WARFARIN SODIUM 5 MG/1
TABLET ORAL
Qty: 90 TABLET | Refills: 0 | Status: SHIPPED | OUTPATIENT
Start: 2024-04-05

## 2024-04-12 ENCOUNTER — ANTICOAGULATION VISIT (OUTPATIENT)
Dept: INTERNAL MEDICINE | Facility: CLINIC | Age: 29
End: 2024-04-12
Payer: COMMERCIAL

## 2024-04-12 LAB — INR PPP: 2.2 (ref 2–3)

## 2024-04-29 ENCOUNTER — ANTICOAGULATION VISIT (OUTPATIENT)
Dept: INTERNAL MEDICINE | Facility: CLINIC | Age: 29
End: 2024-04-29
Payer: COMMERCIAL

## 2024-04-29 DIAGNOSIS — D69.6 THROMBOCYTOPENIA: Primary | ICD-10-CM

## 2024-04-29 LAB — INR PPP: 2 (ref 2–3)

## 2024-04-29 PROCEDURE — 36416 COLLJ CAPILLARY BLOOD SPEC: CPT | Performed by: INTERNAL MEDICINE

## 2024-04-29 PROCEDURE — 85610 PROTHROMBIN TIME: CPT | Performed by: INTERNAL MEDICINE

## 2024-05-21 ENCOUNTER — ANTICOAGULATION VISIT (OUTPATIENT)
Dept: INTERNAL MEDICINE | Facility: CLINIC | Age: 29
End: 2024-05-21
Payer: COMMERCIAL

## 2024-05-21 LAB — INR PPP: 2.6 (ref 2–3)

## 2024-05-21 PROCEDURE — 85610 PROTHROMBIN TIME: CPT | Performed by: INTERNAL MEDICINE

## 2024-05-21 PROCEDURE — 36416 COLLJ CAPILLARY BLOOD SPEC: CPT | Performed by: INTERNAL MEDICINE

## 2024-06-13 ENCOUNTER — ANTICOAGULATION VISIT (OUTPATIENT)
Dept: INTERNAL MEDICINE | Facility: CLINIC | Age: 29
End: 2024-06-13
Payer: COMMERCIAL

## 2024-06-13 LAB — INR PPP: 1.9 (ref 0.9–1.1)

## 2024-06-13 PROCEDURE — 85610 PROTHROMBIN TIME: CPT | Performed by: INTERNAL MEDICINE

## 2024-06-13 PROCEDURE — 36416 COLLJ CAPILLARY BLOOD SPEC: CPT | Performed by: INTERNAL MEDICINE

## 2024-07-05 ENCOUNTER — ANTICOAGULATION VISIT (OUTPATIENT)
Dept: INTERNAL MEDICINE | Facility: CLINIC | Age: 29
End: 2024-07-05
Payer: COMMERCIAL

## 2024-07-05 LAB — INR PPP: 1.7 (ref 2–3)

## 2024-07-05 PROCEDURE — 36416 COLLJ CAPILLARY BLOOD SPEC: CPT | Performed by: INTERNAL MEDICINE

## 2024-07-05 PROCEDURE — 85610 PROTHROMBIN TIME: CPT | Performed by: INTERNAL MEDICINE

## 2024-07-15 ENCOUNTER — ANTICOAGULATION VISIT (OUTPATIENT)
Dept: INTERNAL MEDICINE | Facility: CLINIC | Age: 29
End: 2024-07-15
Payer: COMMERCIAL

## 2024-07-15 LAB — INR PPP: 2.6 (ref 2–3)

## 2024-07-15 PROCEDURE — 36416 COLLJ CAPILLARY BLOOD SPEC: CPT | Performed by: INTERNAL MEDICINE

## 2024-07-15 PROCEDURE — 85610 PROTHROMBIN TIME: CPT | Performed by: INTERNAL MEDICINE

## 2024-08-07 DIAGNOSIS — I63.511 ACUTE ISCHEMIC RIGHT MCA STROKE: ICD-10-CM

## 2024-08-07 DIAGNOSIS — D68.61 ANTIPHOSPHOLIPID SYNDROME: ICD-10-CM

## 2024-08-07 RX ORDER — WARFARIN SODIUM 5 MG/1
TABLET ORAL
Qty: 90 TABLET | Refills: 0 | Status: SHIPPED | OUTPATIENT
Start: 2024-08-07

## 2024-08-16 ENCOUNTER — ANTICOAGULATION VISIT (OUTPATIENT)
Dept: INTERNAL MEDICINE | Facility: CLINIC | Age: 29
End: 2024-08-16
Payer: COMMERCIAL

## 2024-08-16 LAB — INR PPP: 1.8 (ref 2–3)

## 2024-08-16 PROCEDURE — 85610 PROTHROMBIN TIME: CPT | Performed by: INTERNAL MEDICINE

## 2024-08-16 PROCEDURE — 36416 COLLJ CAPILLARY BLOOD SPEC: CPT | Performed by: INTERNAL MEDICINE

## 2024-09-11 ENCOUNTER — ANTICOAGULATION VISIT (OUTPATIENT)
Dept: INTERNAL MEDICINE | Facility: CLINIC | Age: 29
End: 2024-09-11
Payer: COMMERCIAL

## 2024-09-11 LAB — INR PPP: 2.4 (ref 2–3)

## 2024-09-11 PROCEDURE — 36416 COLLJ CAPILLARY BLOOD SPEC: CPT | Performed by: INTERNAL MEDICINE

## 2024-09-11 PROCEDURE — 85610 PROTHROMBIN TIME: CPT | Performed by: INTERNAL MEDICINE

## 2024-10-08 ENCOUNTER — ANTICOAGULATION VISIT (OUTPATIENT)
Dept: INTERNAL MEDICINE | Facility: CLINIC | Age: 29
End: 2024-10-08
Payer: COMMERCIAL

## 2024-10-08 DIAGNOSIS — I82.4Z3 ACUTE DEEP VEIN THROMBOSIS (DVT) OF DISTAL VEIN OF BOTH LOWER EXTREMITIES: Primary | ICD-10-CM

## 2024-10-08 LAB — INR PPP: 1.9 (ref 2–3)

## 2024-10-08 PROCEDURE — 36416 COLLJ CAPILLARY BLOOD SPEC: CPT | Performed by: INTERNAL MEDICINE

## 2024-10-08 PROCEDURE — 85610 PROTHROMBIN TIME: CPT | Performed by: INTERNAL MEDICINE

## 2024-10-31 ENCOUNTER — ANTICOAGULATION VISIT (OUTPATIENT)
Dept: INTERNAL MEDICINE | Facility: CLINIC | Age: 29
End: 2024-10-31
Payer: COMMERCIAL

## 2024-10-31 DIAGNOSIS — I63.511 ACUTE ISCHEMIC RIGHT MCA STROKE: Primary | ICD-10-CM

## 2024-10-31 LAB — INR PPP: 2 (ref 2–3)

## 2024-10-31 PROCEDURE — 85610 PROTHROMBIN TIME: CPT | Performed by: INTERNAL MEDICINE

## 2024-10-31 PROCEDURE — 36416 COLLJ CAPILLARY BLOOD SPEC: CPT | Performed by: INTERNAL MEDICINE

## 2024-11-27 ENCOUNTER — ANTICOAGULATION VISIT (OUTPATIENT)
Dept: INTERNAL MEDICINE | Facility: CLINIC | Age: 29
End: 2024-11-27
Payer: COMMERCIAL

## 2024-11-27 ENCOUNTER — TELEPHONE (OUTPATIENT)
Dept: INTERNAL MEDICINE | Facility: CLINIC | Age: 29
End: 2024-11-27

## 2024-11-27 DIAGNOSIS — I82.4Z3 ACUTE DEEP VEIN THROMBOSIS (DVT) OF DISTAL VEIN OF BOTH LOWER EXTREMITIES: Primary | ICD-10-CM

## 2024-11-27 LAB — INR PPP: 1.6 (ref 2–3)

## 2024-11-27 PROCEDURE — 36416 COLLJ CAPILLARY BLOOD SPEC: CPT | Performed by: INTERNAL MEDICINE

## 2024-11-27 PROCEDURE — 85610 PROTHROMBIN TIME: CPT | Performed by: INTERNAL MEDICINE

## 2024-11-27 RX ORDER — WARFARIN SODIUM 6 MG/1
TABLET ORAL
Qty: 90 TABLET | Refills: 0 | Status: SHIPPED | OUTPATIENT
Start: 2024-11-27

## 2024-11-27 NOTE — TELEPHONE ENCOUNTER
Called pt and left a VM. Start 6mg tablets, recheck on 12-5-2024 and keep follow up appointment for refills.

## 2024-12-05 ENCOUNTER — ANTICOAGULATION VISIT (OUTPATIENT)
Dept: INTERNAL MEDICINE | Facility: CLINIC | Age: 29
End: 2024-12-05
Payer: COMMERCIAL

## 2024-12-05 LAB — INR PPP: 2.5 (ref 0.9–1.1)

## 2024-12-05 PROCEDURE — 85610 PROTHROMBIN TIME: CPT | Performed by: INTERNAL MEDICINE

## 2024-12-05 PROCEDURE — 36416 COLLJ CAPILLARY BLOOD SPEC: CPT | Performed by: INTERNAL MEDICINE

## 2025-01-10 ENCOUNTER — CLINICAL SUPPORT (OUTPATIENT)
Dept: INTERNAL MEDICINE | Facility: CLINIC | Age: 30
End: 2025-01-10
Payer: COMMERCIAL

## 2025-01-10 ENCOUNTER — ANTICOAGULATION VISIT (OUTPATIENT)
Dept: INTERNAL MEDICINE | Facility: CLINIC | Age: 30
End: 2025-01-10
Payer: COMMERCIAL

## 2025-01-10 DIAGNOSIS — I82.4Z3 ACUTE DEEP VEIN THROMBOSIS (DVT) OF DISTAL VEIN OF BOTH LOWER EXTREMITIES: Primary | ICD-10-CM

## 2025-01-10 LAB — INR PPP: 1.4 (ref 2–3)

## 2025-01-10 PROCEDURE — 36416 COLLJ CAPILLARY BLOOD SPEC: CPT | Performed by: INTERNAL MEDICINE

## 2025-01-10 PROCEDURE — 85610 PROTHROMBIN TIME: CPT | Performed by: INTERNAL MEDICINE

## 2025-01-24 ENCOUNTER — OFFICE VISIT (OUTPATIENT)
Dept: INTERNAL MEDICINE | Facility: CLINIC | Age: 30
End: 2025-01-24
Payer: COMMERCIAL

## 2025-01-24 ENCOUNTER — ANTICOAGULATION VISIT (OUTPATIENT)
Dept: INTERNAL MEDICINE | Facility: CLINIC | Age: 30
End: 2025-01-24

## 2025-01-24 VITALS
WEIGHT: 207 LBS | HEART RATE: 96 BPM | TEMPERATURE: 97.7 F | RESPIRATION RATE: 16 BRPM | OXYGEN SATURATION: 99 % | HEIGHT: 72 IN | BODY MASS INDEX: 28.04 KG/M2

## 2025-01-24 DIAGNOSIS — F32.A ANXIETY AND DEPRESSION: ICD-10-CM

## 2025-01-24 DIAGNOSIS — D68.61 ANTIPHOSPHOLIPID SYNDROME: Primary | ICD-10-CM

## 2025-01-24 DIAGNOSIS — F41.9 ANXIETY AND DEPRESSION: ICD-10-CM

## 2025-01-24 DIAGNOSIS — D68.61 ANTIPHOSPHOLIPID SYNDROME: ICD-10-CM

## 2025-01-24 DIAGNOSIS — D69.6 THROMBOCYTOPENIA: ICD-10-CM

## 2025-01-24 DIAGNOSIS — Z23 NEED FOR TDAP VACCINATION: ICD-10-CM

## 2025-01-24 DIAGNOSIS — I63.511 ACUTE ISCHEMIC RIGHT MCA STROKE: ICD-10-CM

## 2025-01-24 DIAGNOSIS — Z00.00 ENCOUNTER FOR PREVENTIVE HEALTH EXAMINATION: Primary | ICD-10-CM

## 2025-01-24 LAB — INR PPP: 2.4 (ref 2–3)

## 2025-01-24 PROCEDURE — 1160F RVW MEDS BY RX/DR IN RCRD: CPT | Performed by: INTERNAL MEDICINE

## 2025-01-24 PROCEDURE — 1126F AMNT PAIN NOTED NONE PRSNT: CPT | Performed by: INTERNAL MEDICINE

## 2025-01-24 PROCEDURE — 99395 PREV VISIT EST AGE 18-39: CPT | Performed by: INTERNAL MEDICINE

## 2025-01-24 PROCEDURE — 1159F MED LIST DOCD IN RCRD: CPT | Performed by: INTERNAL MEDICINE

## 2025-01-24 PROCEDURE — 36416 COLLJ CAPILLARY BLOOD SPEC: CPT | Performed by: INTERNAL MEDICINE

## 2025-01-24 PROCEDURE — 2014F MENTAL STATUS ASSESS: CPT | Performed by: INTERNAL MEDICINE

## 2025-01-24 PROCEDURE — 85610 PROTHROMBIN TIME: CPT | Performed by: INTERNAL MEDICINE

## 2025-01-24 RX ORDER — TETANUS TOXOID, REDUCED DIPHTHERIA TOXOID AND ACELLULAR PERTUSSIS VACCINE, ADSORBED 5; 2.5; 8; 8; 2.5 [IU]/.5ML; [IU]/.5ML; UG/.5ML; UG/.5ML; UG/.5ML
0.5 SUSPENSION INTRAMUSCULAR ONCE
Qty: 0.5 ML | Refills: 0 | Status: SHIPPED | OUTPATIENT
Start: 2025-01-24 | End: 2025-01-24

## 2025-01-24 NOTE — PATIENT INSTRUCTIONS
Health Maintenance, Male  Adopting a healthy lifestyle and getting preventive care are important in promoting health and wellness. Ask your health care provider about:  The right schedule for you to have regular tests and exams.  Things you can do on your own to prevent diseases and keep yourself healthy.  What should I know about diet, weight, and exercise?  Eat a healthy diet    Eat a diet that includes plenty of vegetables, fruits, low-fat dairy products, and lean protein.  Do not eat a lot of foods that are high in solid fats, added sugars, or sodium.  Maintain a healthy weight  Body mass index (BMI) is a measurement that can be used to identify possible weight problems. It estimates body fat based on height and weight. Your health care provider can help determine your BMI and help you achieve or maintain a healthy weight.  Get regular exercise  Get regular exercise. This is one of the most important things you can do for your health. Most adults should:  Exercise for at least 150 minutes each week. The exercise should increase your heart rate and make you sweat (moderate-intensity exercise).  Do strengthening exercises at least twice a week. This is in addition to the moderate-intensity exercise.  Spend less time sitting. Even light physical activity can be beneficial.  Watch cholesterol and blood lipids  Have your blood tested for lipids and cholesterol at 20 years of age, then have this test every 5 years.  You may need to have your cholesterol levels checked more often if:  Your lipid or cholesterol levels are high.  You are older than 40 years of age.  You are at high risk for heart disease.  What should I know about cancer screening?  Many types of cancers can be detected early and may often be prevented. Depending on your health history and family history, you may need to have cancer screening at various ages. This may include screening for:  Colorectal cancer.  Prostate cancer.  Skin cancer.  Lung  cancer.  What should I know about heart disease, diabetes, and high blood pressure?  Blood pressure and heart disease  High blood pressure causes heart disease and increases the risk of stroke. This is more likely to develop in people who have high blood pressure readings or are overweight.  Talk with your health care provider about your target blood pressure readings.  Have your blood pressure checked:  Every 3-5 years if you are 18-39 years of age.  Every year if you are 40 years old or older.  If you are between the ages of 65 and 75 and are a current or former smoker, ask your health care provider if you should have a one-time screening for abdominal aortic aneurysm (AAA).  Diabetes  Have regular diabetes screenings. This checks your fasting blood sugar level. Have the screening done:  Once every three years after age 45 if you are at a normal weight and have a low risk for diabetes.  More often and at a younger age if you are overweight or have a high risk for diabetes.  What should I know about preventing infection?  Hepatitis B  If you have a higher risk for hepatitis B, you should be screened for this virus. Talk with your health care provider to find out if you are at risk for hepatitis B infection.  Hepatitis C  Blood testing is recommended for:  Everyone born from 1945 through 1965.  Anyone with known risk factors for hepatitis C.  Sexually transmitted infections (STIs)  You should be screened each year for STIs, including gonorrhea and chlamydia, if:  You are sexually active and are younger than 24 years of age.  You are older than 24 years of age and your health care provider tells you that you are at risk for this type of infection.  Your sexual activity has changed since you were last screened, and you are at increased risk for chlamydia or gonorrhea. Ask your health care provider if you are at risk.  Ask your health care provider about whether you are at high risk for HIV. Your health care provider  may recommend a prescription medicine to help prevent HIV infection. If you choose to take medicine to prevent HIV, you should first get tested for HIV. You should then be tested every 3 months for as long as you are taking the medicine.  Follow these instructions at home:  Alcohol use  Do not drink alcohol if your health care provider tells you not to drink.  If you drink alcohol:  Limit how much you have to 0-2 drinks a day.  Know how much alcohol is in your drink. In the [place] one drink equals one 12 oz bottle of beer (355 mL), one 5 oz glass of wine (148 mL), or one 1½ oz glass of hard liquor (44 mL).  Lifestyle  Do not use any products that contain nicotine or tobacco. These products include cigarettes, chewing tobacco, and vaping devices, such as e-cigarettes. If you need help quitting, ask your health care provider.  Do not use street drugs.  Do not share needles.  Ask your health care provider for help if you need support or information about quitting drugs.  General instructions  Schedule regular health, dental, and eye exams.  Stay current with your vaccines.  Tell your health care provider if:  You often feel depressed.  You have ever been abused or do not feel safe at home.  Summary  Adopting a healthy lifestyle and getting preventive care are important in promoting health and wellness.  Follow your health care provider's instructions about healthy diet, exercising, and getting tested or screened for diseases.  Follow your health care provider's instructions on monitoring your cholesterol and blood pressure.  This information is not intended to replace advice given to you by your health care provider. Make sure you discuss any questions you have with your health care provider.  Document Revised: 05/09/2022 Document Reviewed: 05/09/2022  Elsevier Patient Education © 2024 [employer]

## 2025-01-24 NOTE — PROGRESS NOTES
Chief Complaint   Patient presents with    Annual Exam    Bleeding/Bruising     Bruising under arm pits, both sides       Subjective     History of Present Illness  The patient is a 29-year-old male with a history of antiphospholipid syndrome, on warfarin for anticoagulation, and a history of depression, who presents today for an annual exam. He has concerns about bruising in his armpits bilaterally.    He reports the presence of significant bruising under both armpits, first noticed a few days ago after showering. The bruises were initially purple but have since darkened to a brown hue. He is uncertain of the cause of these bruises and reports no associated lumps or bumps. He mentions that the area was tender, prompting him to conduct an online search for potential causes. He speculates that the bruising may be related to recent physical activity, as he has been engaging in workouts to increase his muscle mass. He also mentions occasional sleeping positions that involve resting on his arm, but notes that this does not typically result in pain.    He has a history of antiphospholipid syndrome and is currently on warfarin for anticoagulation. His INR was 2.4. He has been managing his warfarin dosage independently, adjusting it based on his perceived INR levels. He has previously used an INR machine at home but no longer has access to it. He has not had any recent consultations with his hematologist or cardiologist, with his last visit to Dr. Clark in 2022 resulting in a recommendation for lifelong anticoagulation therapy. He has not undergone routine blood work recently. He was previously on Eliquis but was advised to switch to warfarin due to its superior efficacy.    He expresses interest in exploring insurance coverage for therapy sessions to address his anxiety and depression. He reports that his current medication, Zoloft, has not been effective in managing his symptoms. 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 He resides with his grandmother and has ceased smoking, although he continues to use marijuana daily. He reports that abstaining from marijuana did not result in any noticeable changes in his mood. He finds that marijuana aids in managing his anxiety and provides relief from shoulder pain, which he describes as a knot-like sensation extending from his back down to his hand.    SOCIAL HISTORY  He works at a gas station. He lives with his grandmother. He quit smoking but smokes marijuana daily.    MEDICATIONS  Current: Warfarin, Zoloft  Past: Eliquis    The following portions of the patient's history were reviewed and updated as appropriate: allergies, current medications, past family history, past medical history, past social history, past surgical history and problem list.    Review of Systems   Constitutional:  Negative for chills, fatigue and fever.   HENT:  Negative for congestion, ear pain, rhinorrhea, sinus pressure and sore throat.    Eyes:  Negative for visual disturbance.   Respiratory:  Negative for cough, chest tightness, shortness of breath and wheezing.    Cardiovascular:  Negative for chest pain, palpitations and leg swelling.   Gastrointestinal:  Negative for abdominal pain, blood in stool, constipation, diarrhea, nausea and vomiting.   Endocrine: Negative for polydipsia and polyuria.   Genitourinary:  Negative for dysuria and hematuria.   Musculoskeletal:  Negative for arthralgias and back pain.   Skin:  Negative for rash.   Neurological:  Negative for dizziness, light-headedness, numbness and headaches.   Psychiatric/Behavioral:  Negative for dysphoric mood and sleep disturbance. The patient is not nervous/anxious.        No Known Allergies    Past Medical History:   Diagnosis Date    Antiphospholipid syndrome     Deep vein thrombosis     GERD (gastroesophageal reflux disease)     Stroke        Social History     Socioeconomic History     "Marital status: Single   Tobacco Use    Smoking status: Former     Current packs/day: 0.00     Types: Cigarettes     Start date: 7/19/2021     Quit date: 7/19/2021     Years since quitting: 3.5     Passive exposure: Past    Smokeless tobacco: Former   Vaping Use    Vaping status: Former    Substances: Nicotine, Flavoring, years ago    Devices: Disposable    Passive vaping exposure: Yes   Substance and Sexual Activity    Alcohol use: Not Currently     Comment: social    Drug use: Yes     Types: Marijuana     Comment: few times daily    Sexual activity: Defer        Past Surgical History:   Procedure Laterality Date    FRACTURE SURGERY      INTERVENTIONAL RADIOLOGY PROCEDURE Bilateral 5/24/2022    Procedure: CAROTID CEREBRAL ANGIOGRAM BILATERAL;  Surgeon: Juventino Headley MD;  Location: [facility] INVASIVE LOCATION;  Service: Interventional Radiology;  Laterality: Bilateral;    LEG SURGERY Right     spiral fracture repair three years ago        Family History   Problem Relation Age of Onset    No Known Problems Mother          Current Outpatient Medications:     warfarin (COUMADIN) 5 MG tablet, TAKE 1 TABLET BY MOUTH ONCE DAILY ON MONDAY, WEDNESDAY AND FRIDAY. THEN TAKE 1.5 TABLETS BY MOUTH ON TUESDAY THURSDAY, SATURDAY, AND SUNDAY, Disp: 90 tablet, Rfl: 0    warfarin (COUMADIN) 6 MG tablet, Take one tablet by mouth DAILY, Disp: 90 tablet, Rfl: 0    warfarin (Coumadin) 7.5 MG tablet, Take daily as needed, Disp: 90 tablet, Rfl: 3    Tdap (Boostrix) 5-2.5-18.5 LF-MCG/0.5 injection, Inject 0.5 mL into the appropriate muscle as directed by prescriber 1 (One) Time for 1 dose., Disp: 0.5 mL, Rfl: 0    Objective   Pulse 96   Temp 97.7 °F (36.5 °C) (Temporal)   Resp 16   Ht 182.9 cm (72\")   Wt 93.9 kg (207 lb)   SpO2 99%   BMI 28.07 kg/m²     Physical Exam  Vitals and nursing note reviewed.   Constitutional:       Appearance: Normal appearance. He is well-developed.   HENT:      Head: Normocephalic and atraumatic.    "   Right Ear: Tympanic membrane and external ear normal.      Left Ear: Tympanic membrane and external ear normal.      Nose: Nose normal. No congestion.      Mouth/Throat:      Mouth: Mucous membranes are moist.      Pharynx: No oropharyngeal exudate.   Eyes:      General: No scleral icterus.        Right eye: No discharge.      Extraocular Movements: Extraocular movements intact.      Conjunctiva/sclera: Conjunctivae normal.      Pupils: Pupils are equal, round, and reactive to light.   Neck:      Thyroid: No thyromegaly.      Vascular: No JVD.   Cardiovascular:      Rate and Rhythm: Normal rate and regular rhythm.      Heart sounds: Normal heart sounds. No murmur heard.     No friction rub.   Pulmonary:      Effort: Pulmonary effort is normal. No respiratory distress.      Breath sounds: Normal breath sounds. No stridor. No wheezing.   Abdominal:      General: Bowel sounds are normal. There is no distension.      Palpations: Abdomen is soft.      Tenderness: There is no abdominal tenderness. There is no guarding.   Genitourinary:     Comments: Deferred  Musculoskeletal:         General: No tenderness. Normal range of motion.      Cervical back: Normal range of motion and neck supple.   Lymphadenopathy:      Cervical: No cervical adenopathy.   Skin:     General: Skin is warm and dry.      Findings: Bruising (in armpit right side with brown discoloration) present. No rash.   Neurological:      General: No focal deficit present.      Mental Status: He is alert and oriented to person, place, and time.      Cranial Nerves: No cranial nerve deficit.   Psychiatric:         Mood and Affect: Mood normal.         Behavior: Behavior normal.         Thought Content: Thought content normal.           Results for orders placed or performed in visit on 01/10/25   POC INR    Collection Time: 01/10/25 12:00 AM    Specimen: Blood   Result Value Ref Range    INR 1.40 (A) 2.00 - 3.00       Assessment & Plan   Diagnoses and all orders  for this visit:    1. Encounter for preventive health examination (Primary)  -     CBC & Differential  -     Comprehensive Metabolic Panel  -     Lipid Panel  -     Hepatitis Panel, Acute    2. Antiphospholipid syndrome  -     CBC & Differential  -     Comprehensive Metabolic Panel  -     Lipid Panel  -     Hepatitis Panel, Acute  -     Home INR Monitor    3. Thrombocytopenia  -     CBC & Differential  -     Comprehensive Metabolic Panel  -     Lipid Panel  -     Hepatitis Panel, Acute  -     Home INR Monitor    4. Anxiety and depression  -     Ambulatory Referral to Psychology    5. Need for Tdap vaccination  -     Tdap (Boostrix) 5-2.5-18.5 LF-MCG/0.5 injection; Inject 0.5 mL into the appropriate muscle as directed by prescriber 1 (One) Time for 1 dose.  Dispense: 0.5 mL; Refill: 0        Discussion Summary:  Patient is a 29 y.o. male presenting for annual physical    Preventive Health Maintenance  - Baseline labs are up-to-date or ordered per above.  - Vaccines reviewed and updated  - Preventive health measures were discussed including: healthy diet with increase in fruits and vegetables, regular exercise at least 3 times a week, safe sex practices, avoidance of drugs, tobacco, and alcohol, and regular seatbelt use.    Assessment & Plan  Bilateral axillary ecchymosis.  The patient's INR levels have been inconsistent over the past year, with occasional dips to 1.5. He has demonstrated an ability to self-regulate his warfarin dosage based on these fluctuations. The current ecchymosis appears to be resolving spontaneously. A comprehensive set of laboratory tests will be ordered to investigate the cause of the bruising. If the results are within normal limits, he will be advised to continue his warfarin regimen. He has been counseled on the potential risks associated with anticoagulation therapy, including the possibility of minor bruising. He has been instructed to seek immediate medical attention at the emergency  room in case of any doubts or concerns.    Antiphospholipid syndrome.  He is currently on warfarin for anticoagulation. His INR was noted to be 2.4. He has been advised to continue his warfarin regimen. A referral will be made for him to obtain an INR machine for home use, which should facilitate better management of his condition. He has been reminded to follow up with routine blood work at least once a year to monitor his blood counts and overall health.     Depression and anxiety.  He reports feeling anxious and depressed, finding little usefulness around him. He has been on a low dose of Zoloft, which he feels has not been very effective. A referral will be made for him to see a psychologist for counseling. He has been informed that combining counseling with medication can be more effective if needed. He has also been advised to consider seeing a psychiatry nurse practitioner for potential medication adjustments.     Health maintenance.  His BMI is 28. He has been advised to receive a tetanus vaccine, which he can obtain from the pharmacy. He has been encouraged to engage in regular physical activity and maintain a healthy diet to manage his weight.         Follow up:  No follow-ups on file.     Patient Instructions:  Patient instructions were provided.    Patient or patient representative verbalized consent for the use of Ambient Listening during the visit with  Dustin Patel DO for chart documentation. 1/24/2025  11:23 EST

## 2025-01-31 ENCOUNTER — LAB (OUTPATIENT)
Dept: LAB | Facility: HOSPITAL | Age: 30
End: 2025-01-31
Payer: COMMERCIAL

## 2025-01-31 LAB
ALBUMIN SERPL-MCNC: 4.7 G/DL (ref 3.5–5.2)
ALBUMIN/GLOB SERPL: 1.2 G/DL
ALP SERPL-CCNC: 61 U/L (ref 39–117)
ALT SERPL W P-5'-P-CCNC: 62 U/L (ref 1–41)
ANION GAP SERPL CALCULATED.3IONS-SCNC: 12 MMOL/L (ref 5–15)
AST SERPL-CCNC: 46 U/L (ref 1–40)
BASOPHILS # BLD AUTO: 0.11 10*3/MM3 (ref 0–0.2)
BASOPHILS NFR BLD AUTO: 1.5 % (ref 0–1.5)
BILIRUB SERPL-MCNC: 0.6 MG/DL (ref 0–1.2)
BUN SERPL-MCNC: 15 MG/DL (ref 6–20)
BUN/CREAT SERPL: 12.5 (ref 7–25)
CALCIUM SPEC-SCNC: 9.4 MG/DL (ref 8.6–10.5)
CHLORIDE SERPL-SCNC: 101 MMOL/L (ref 98–107)
CHOLEST SERPL-MCNC: 141 MG/DL (ref 0–200)
CO2 SERPL-SCNC: 24 MMOL/L (ref 22–29)
CREAT SERPL-MCNC: 1.2 MG/DL (ref 0.76–1.27)
DEPRECATED RDW RBC AUTO: 37.9 FL (ref 37–54)
EGFRCR SERPLBLD CKD-EPI 2021: 85 ML/MIN/1.73
EOSINOPHIL # BLD AUTO: 0.17 10*3/MM3 (ref 0–0.4)
EOSINOPHIL NFR BLD AUTO: 2.4 % (ref 0.3–6.2)
ERYTHROCYTE [DISTWIDTH] IN BLOOD BY AUTOMATED COUNT: 11.9 % (ref 12.3–15.4)
GLOBULIN UR ELPH-MCNC: 4 GM/DL
GLUCOSE SERPL-MCNC: 106 MG/DL (ref 65–99)
HAV IGM SERPL QL IA: NORMAL
HBV CORE IGM SERPL QL IA: NORMAL
HBV SURFACE AG SERPL QL IA: NORMAL
HCT VFR BLD AUTO: 48.7 % (ref 37.5–51)
HCV AB SER QL: NORMAL
HDLC SERPL-MCNC: 28 MG/DL (ref 40–60)
HGB BLD-MCNC: 16.8 G/DL (ref 13–17.7)
IMM GRANULOCYTES # BLD AUTO: 0.01 10*3/MM3 (ref 0–0.05)
IMM GRANULOCYTES NFR BLD AUTO: 0.1 % (ref 0–0.5)
LDLC SERPL CALC-MCNC: 98 MG/DL (ref 0–100)
LDLC/HDLC SERPL: 3.49 {RATIO}
LYMPHOCYTES # BLD AUTO: 1.69 10*3/MM3 (ref 0.7–3.1)
LYMPHOCYTES NFR BLD AUTO: 23.5 % (ref 19.6–45.3)
MCH RBC QN AUTO: 29.9 PG (ref 26.6–33)
MCHC RBC AUTO-ENTMCNC: 34.5 G/DL (ref 31.5–35.7)
MCV RBC AUTO: 86.8 FL (ref 79–97)
MONOCYTES # BLD AUTO: 0.82 10*3/MM3 (ref 0.1–0.9)
MONOCYTES NFR BLD AUTO: 11.4 % (ref 5–12)
NEUTROPHILS NFR BLD AUTO: 4.39 10*3/MM3 (ref 1.7–7)
NEUTROPHILS NFR BLD AUTO: 61.1 % (ref 42.7–76)
NRBC BLD AUTO-RTO: 0 /100 WBC (ref 0–0.2)
PLATELET # BLD AUTO: 122 10*3/MM3 (ref 140–450)
PMV BLD AUTO: 10.2 FL (ref 6–12)
POTASSIUM SERPL-SCNC: 4.1 MMOL/L (ref 3.5–5.2)
PROT SERPL-MCNC: 8.7 G/DL (ref 6–8.5)
RBC # BLD AUTO: 5.61 10*6/MM3 (ref 4.14–5.8)
SODIUM SERPL-SCNC: 137 MMOL/L (ref 136–145)
TRIGL SERPL-MCNC: 77 MG/DL (ref 0–150)
VLDLC SERPL-MCNC: 15 MG/DL (ref 5–40)
WBC NRBC COR # BLD AUTO: 7.19 10*3/MM3 (ref 3.4–10.8)

## 2025-01-31 PROCEDURE — 80053 COMPREHEN METABOLIC PANEL: CPT | Performed by: INTERNAL MEDICINE

## 2025-01-31 PROCEDURE — 80061 LIPID PANEL: CPT | Performed by: INTERNAL MEDICINE

## 2025-01-31 PROCEDURE — 85025 COMPLETE CBC W/AUTO DIFF WBC: CPT | Performed by: INTERNAL MEDICINE

## 2025-01-31 PROCEDURE — 80074 ACUTE HEPATITIS PANEL: CPT | Performed by: INTERNAL MEDICINE

## 2025-02-07 ENCOUNTER — ANTICOAGULATION VISIT (OUTPATIENT)
Dept: INTERNAL MEDICINE | Facility: CLINIC | Age: 30
End: 2025-02-07
Payer: COMMERCIAL

## 2025-02-07 LAB — INR PPP: 1.6 (ref 2–3)

## 2025-02-07 PROCEDURE — 36416 COLLJ CAPILLARY BLOOD SPEC: CPT | Performed by: INTERNAL MEDICINE

## 2025-02-07 PROCEDURE — 85610 PROTHROMBIN TIME: CPT | Performed by: INTERNAL MEDICINE

## 2025-02-17 RX ORDER — WARFARIN SODIUM 6 MG/1
TABLET ORAL
Qty: 90 TABLET | Refills: 0 | OUTPATIENT
Start: 2025-02-17

## 2025-03-05 RX ORDER — WARFARIN SODIUM 6 MG/1
TABLET ORAL
Qty: 90 TABLET | Refills: 1 | Status: SHIPPED | OUTPATIENT
Start: 2025-03-05

## 2025-03-07 ENCOUNTER — ANTICOAGULATION VISIT (OUTPATIENT)
Dept: INTERNAL MEDICINE | Facility: CLINIC | Age: 30
End: 2025-03-07
Payer: COMMERCIAL

## 2025-03-07 DIAGNOSIS — I82.4Z3 ACUTE DEEP VEIN THROMBOSIS (DVT) OF DISTAL VEIN OF BOTH LOWER EXTREMITIES: Primary | ICD-10-CM

## 2025-03-07 LAB — INR PPP: 1.6 (ref 0.9–1.1)

## 2025-03-07 PROCEDURE — 85610 PROTHROMBIN TIME: CPT | Performed by: INTERNAL MEDICINE

## 2025-03-07 PROCEDURE — 36416 COLLJ CAPILLARY BLOOD SPEC: CPT | Performed by: INTERNAL MEDICINE

## 2025-03-25 ENCOUNTER — TELEPHONE (OUTPATIENT)
Dept: INTERNAL MEDICINE | Facility: CLINIC | Age: 30
End: 2025-03-25
Payer: COMMERCIAL

## 2025-03-25 NOTE — LETTER
March 25, 2025     Vargas Reyna  [address] [facility] [address]    Patient: Vargas Beal Shandaraimundo   YOB: 1995   Date of Visit: 3/25/2025     Dear Vargas Reyna:     This letter is being sent to notify you that you are past due for your INR check. Please stop by the clinic at your convenience to have this test completed.       Sincerely,        Dustin Patel DO        CC: No Recipients

## 2025-04-04 ENCOUNTER — ANTICOAGULATION VISIT (OUTPATIENT)
Dept: INTERNAL MEDICINE | Facility: CLINIC | Age: 30
End: 2025-04-04
Payer: COMMERCIAL

## 2025-04-04 LAB — INR PPP: 1.5 (ref 2–3)

## 2025-05-06 ENCOUNTER — ANTICOAGULATION VISIT (OUTPATIENT)
Dept: INTERNAL MEDICINE | Facility: CLINIC | Age: 30
End: 2025-05-06
Payer: COMMERCIAL

## 2025-05-06 DIAGNOSIS — I82.4Z3 ACUTE DEEP VEIN THROMBOSIS (DVT) OF DISTAL VEIN OF BOTH LOWER EXTREMITIES: Primary | ICD-10-CM

## 2025-05-06 LAB — INR PPP: 1.4 (ref 2–3)

## 2025-05-06 PROCEDURE — 85610 PROTHROMBIN TIME: CPT | Performed by: INTERNAL MEDICINE

## 2025-05-06 PROCEDURE — 36416 COLLJ CAPILLARY BLOOD SPEC: CPT | Performed by: INTERNAL MEDICINE

## 2025-06-04 ENCOUNTER — ANTICOAGULATION VISIT (OUTPATIENT)
Dept: INTERNAL MEDICINE | Facility: CLINIC | Age: 30
End: 2025-06-04
Payer: COMMERCIAL

## 2025-06-04 DIAGNOSIS — I82.4Z3 ACUTE DEEP VEIN THROMBOSIS (DVT) OF DISTAL VEIN OF BOTH LOWER EXTREMITIES: Primary | ICD-10-CM

## 2025-06-04 LAB — INR PPP: 1.7 (ref 2–3)

## 2025-06-04 PROCEDURE — 36416 COLLJ CAPILLARY BLOOD SPEC: CPT | Performed by: INTERNAL MEDICINE

## 2025-06-04 PROCEDURE — 85610 PROTHROMBIN TIME: CPT | Performed by: INTERNAL MEDICINE

## 2025-07-17 ENCOUNTER — ANTICOAGULATION VISIT (OUTPATIENT)
Dept: INTERNAL MEDICINE | Facility: CLINIC | Age: 30
End: 2025-07-17
Payer: COMMERCIAL

## 2025-07-17 DIAGNOSIS — I82.4Z3 ACUTE DEEP VEIN THROMBOSIS (DVT) OF DISTAL VEIN OF BOTH LOWER EXTREMITIES: Primary | ICD-10-CM

## 2025-07-17 LAB — INR PPP: 2 (ref 2–3)

## 2025-07-17 PROCEDURE — 85610 PROTHROMBIN TIME: CPT | Performed by: INTERNAL MEDICINE

## 2025-07-17 PROCEDURE — 36416 COLLJ CAPILLARY BLOOD SPEC: CPT | Performed by: INTERNAL MEDICINE

## 2025-08-18 ENCOUNTER — ANTICOAGULATION VISIT (OUTPATIENT)
Dept: INTERNAL MEDICINE | Facility: CLINIC | Age: 30
End: 2025-08-18
Payer: COMMERCIAL

## 2025-08-18 DIAGNOSIS — I82.4Z3 ACUTE DEEP VEIN THROMBOSIS (DVT) OF DISTAL VEIN OF BOTH LOWER EXTREMITIES: Primary | ICD-10-CM

## 2025-08-18 LAB — INR PPP: 1.7 (ref 2–3)

## 2025-08-18 PROCEDURE — 36416 COLLJ CAPILLARY BLOOD SPEC: CPT | Performed by: INTERNAL MEDICINE

## 2025-08-18 PROCEDURE — 85610 PROTHROMBIN TIME: CPT | Performed by: INTERNAL MEDICINE

## 2025-08-18 RX ORDER — WARFARIN SODIUM 6 MG/1
TABLET ORAL
Qty: 90 TABLET | Refills: 1 | Status: SHIPPED | OUTPATIENT
Start: 2025-08-18

## (undated) DEVICE — ANGIO-SEAL VIP VASCULAR CLOSURE DEVICE: Brand: ANGIO-SEAL

## (undated) DEVICE — LIMB HOLDER, WRIST/ANKLE: Brand: DEROYAL

## (undated) DEVICE — LONG SHEATH: Brand: AXS INFINITY LS PLUS

## (undated) DEVICE — PINNACLE INTRODUCER SHEATH: Brand: PINNACLE

## (undated) DEVICE — RADIFOCUS GLIDEWIRE: Brand: GLIDEWIRE

## (undated) DEVICE — ROTATING HEMOSTATIC VALVE .096": Brand: RHV

## (undated) DEVICE — DEV TORQ H2OTORQ GW .025TO.040IN ORNG

## (undated) DEVICE — TBG CONN ASP PENUMBRA SYSTEM MAX .88IN

## (undated) DEVICE — STPCK LP 1WY RA 200PSI

## (undated) DEVICE — PROVUE RETRIEVER: Brand: TREVO NXT

## (undated) DEVICE — Device

## (undated) DEVICE — INTERMEDIATE CATHETER: Brand: AXS VECTA 71

## (undated) DEVICE — CATH MIC PHENOM 27 .040IN 15CM

## (undated) DEVICE — ST ACC MICROPUNCTURE .018 TRANSLSS/SS/TP 5F/10CM 21G/7CM

## (undated) DEVICE — CANSTR COL ENGINE FOR INDIGO SYS

## (undated) DEVICE — STPCK 3/WY HP M/RA W/OFF/HNDL 1050PSI STRL

## (undated) DEVICE — LEX NEURO ANGIOGRAPHY: Brand: MEDLINE INDUSTRIES, INC.